# Patient Record
Sex: FEMALE | Race: WHITE | NOT HISPANIC OR LATINO | Employment: OTHER | URBAN - NONMETROPOLITAN AREA
[De-identification: names, ages, dates, MRNs, and addresses within clinical notes are randomized per-mention and may not be internally consistent; named-entity substitution may affect disease eponyms.]

---

## 2017-01-01 ENCOUNTER — APPOINTMENT (OUTPATIENT)
Dept: GENERAL RADIOLOGY | Facility: HOSPITAL | Age: 72
End: 2017-01-01
Attending: STUDENT IN AN ORGANIZED HEALTH CARE EDUCATION/TRAINING PROGRAM

## 2017-01-01 ENCOUNTER — TELEPHONE (OUTPATIENT)
Dept: INTERNAL MEDICINE | Facility: CLINIC | Age: 72
End: 2017-01-01

## 2017-01-01 ENCOUNTER — OFFICE VISIT (OUTPATIENT)
Dept: INTERNAL MEDICINE | Facility: CLINIC | Age: 72
End: 2017-01-01

## 2017-01-01 ENCOUNTER — LAB (OUTPATIENT)
Dept: LAB | Facility: HOSPITAL | Age: 72
End: 2017-01-01
Attending: PSYCHIATRY & NEUROLOGY

## 2017-01-01 ENCOUNTER — APPOINTMENT (OUTPATIENT)
Dept: GENERAL RADIOLOGY | Facility: HOSPITAL | Age: 72
End: 2017-01-01

## 2017-01-01 ENCOUNTER — OUTSIDE FACILITY SERVICE (OUTPATIENT)
Dept: INTERNAL MEDICINE | Facility: CLINIC | Age: 72
End: 2017-01-01

## 2017-01-01 ENCOUNTER — HOSPITAL ENCOUNTER (EMERGENCY)
Facility: HOSPITAL | Age: 72
Discharge: HOME | End: 2017-02-27
Attending: EMERGENCY MEDICINE | Admitting: EMERGENCY MEDICINE

## 2017-01-01 ENCOUNTER — TRANSCRIBE ORDERS (OUTPATIENT)
Dept: ADMINISTRATIVE | Facility: HOSPITAL | Age: 72
End: 2017-01-01

## 2017-01-01 ENCOUNTER — APPOINTMENT (OUTPATIENT)
Dept: MAMMOGRAPHY | Facility: HOSPITAL | Age: 72
End: 2017-01-01

## 2017-01-01 ENCOUNTER — PATIENT OUTREACH (OUTPATIENT)
Dept: CASE MANAGEMENT | Facility: OTHER | Age: 72
End: 2017-01-01

## 2017-01-01 ENCOUNTER — HOSPITAL ENCOUNTER (OUTPATIENT)
Dept: MAMMOGRAPHY | Facility: HOSPITAL | Age: 72
Discharge: HOME | End: 2017-03-10
Admitting: FAMILY MEDICINE

## 2017-01-01 ENCOUNTER — TELEPHONE (OUTPATIENT)
Dept: NEUROLOGY | Facility: CLINIC | Age: 72
End: 2017-01-01

## 2017-01-01 ENCOUNTER — HOSPITAL ENCOUNTER (INPATIENT)
Facility: HOSPITAL | Age: 72
LOS: 7 days | Discharge: NURSING, REHAB OR LONG-TERM CARE | End: 2017-03-28
Attending: EMERGENCY MEDICINE | Admitting: INTERNAL MEDICINE

## 2017-01-01 ENCOUNTER — APPOINTMENT (OUTPATIENT)
Dept: CT IMAGING | Facility: HOSPITAL | Age: 72
End: 2017-01-01

## 2017-01-01 ENCOUNTER — HOSPITAL ENCOUNTER (OUTPATIENT)
Dept: MAMMOGRAPHY | Facility: HOSPITAL | Age: 72
End: 2017-01-01

## 2017-01-01 ENCOUNTER — HOSPITAL ENCOUNTER (INPATIENT)
Facility: HOSPITAL | Age: 72
LOS: 5 days | Discharge: NURSING, REHAB OR LONG-TERM CARE | End: 2017-03-08
Attending: EMERGENCY MEDICINE | Admitting: INTERNAL MEDICINE

## 2017-01-01 ENCOUNTER — APPOINTMENT (OUTPATIENT)
Dept: ULTRASOUND IMAGING | Facility: HOSPITAL | Age: 72
End: 2017-01-01

## 2017-01-01 ENCOUNTER — APPOINTMENT (OUTPATIENT)
Dept: CARDIOLOGY | Facility: HOSPITAL | Age: 72
End: 2017-01-01
Attending: INTERNAL MEDICINE

## 2017-01-01 ENCOUNTER — HOSPITAL ENCOUNTER (EMERGENCY)
Facility: HOSPITAL | Age: 72
Discharge: HOME | End: 2017-06-05
Attending: EMERGENCY MEDICINE | Admitting: EMERGENCY MEDICINE

## 2017-01-01 ENCOUNTER — HOSPITAL ENCOUNTER (INPATIENT)
Facility: HOSPITAL | Age: 72
LOS: 4 days | Discharge: NURSING, REHAB OR LONG-TERM CARE | End: 2017-06-30
Attending: FAMILY MEDICINE | Admitting: FAMILY MEDICINE

## 2017-01-01 ENCOUNTER — APPOINTMENT (OUTPATIENT)
Dept: GENERAL RADIOLOGY | Facility: HOSPITAL | Age: 72
End: 2017-01-01
Attending: HOSPITALIST

## 2017-01-01 ENCOUNTER — OFFICE VISIT (OUTPATIENT)
Dept: NEUROLOGY | Facility: CLINIC | Age: 72
End: 2017-01-01

## 2017-01-01 ENCOUNTER — HOSPITAL ENCOUNTER (EMERGENCY)
Facility: HOSPITAL | Age: 72
Discharge: HOME | End: 2017-04-10
Attending: EMERGENCY MEDICINE | Admitting: EMERGENCY MEDICINE

## 2017-01-01 ENCOUNTER — TELEPHONE (OUTPATIENT)
Dept: CARDIOLOGY | Facility: CLINIC | Age: 72
End: 2017-01-01

## 2017-01-01 ENCOUNTER — HOSPITAL ENCOUNTER (EMERGENCY)
Facility: HOSPITAL | Age: 72
Discharge: HOME | End: 2017-04-02
Attending: EMERGENCY MEDICINE | Admitting: EMERGENCY MEDICINE

## 2017-01-01 ENCOUNTER — HOSPITAL ENCOUNTER (EMERGENCY)
Facility: HOSPITAL | Age: 72
Discharge: HOME | End: 2017-04-08
Attending: EMERGENCY MEDICINE | Admitting: EMERGENCY MEDICINE

## 2017-01-01 ENCOUNTER — HOSPITAL ENCOUNTER (EMERGENCY)
Facility: HOSPITAL | Age: 72
Discharge: HOME | End: 2017-06-07
Attending: STUDENT IN AN ORGANIZED HEALTH CARE EDUCATION/TRAINING PROGRAM | Admitting: STUDENT IN AN ORGANIZED HEALTH CARE EDUCATION/TRAINING PROGRAM

## 2017-01-01 VITALS
HEART RATE: 63 BPM | HEIGHT: 65 IN | OXYGEN SATURATION: 95 % | WEIGHT: 185 LBS | RESPIRATION RATE: 16 BRPM | DIASTOLIC BLOOD PRESSURE: 52 MMHG | TEMPERATURE: 98.9 F | SYSTOLIC BLOOD PRESSURE: 116 MMHG | BODY MASS INDEX: 30.82 KG/M2

## 2017-01-01 VITALS
TEMPERATURE: 98.2 F | WEIGHT: 165 LBS | HEIGHT: 65 IN | RESPIRATION RATE: 19 BRPM | HEART RATE: 81 BPM | OXYGEN SATURATION: 98 % | DIASTOLIC BLOOD PRESSURE: 60 MMHG | BODY MASS INDEX: 27.49 KG/M2 | SYSTOLIC BLOOD PRESSURE: 144 MMHG

## 2017-01-01 VITALS
SYSTOLIC BLOOD PRESSURE: 133 MMHG | OXYGEN SATURATION: 98 % | BODY MASS INDEX: 29.16 KG/M2 | HEIGHT: 65 IN | DIASTOLIC BLOOD PRESSURE: 68 MMHG | HEART RATE: 74 BPM | TEMPERATURE: 97.8 F | RESPIRATION RATE: 24 BRPM | WEIGHT: 175 LBS

## 2017-01-01 VITALS
WEIGHT: 161 LBS | HEART RATE: 73 BPM | SYSTOLIC BLOOD PRESSURE: 140 MMHG | HEIGHT: 65 IN | TEMPERATURE: 98 F | DIASTOLIC BLOOD PRESSURE: 76 MMHG | BODY MASS INDEX: 26.82 KG/M2 | OXYGEN SATURATION: 94 % | RESPIRATION RATE: 17 BRPM

## 2017-01-01 VITALS
HEIGHT: 65 IN | BODY MASS INDEX: 31.58 KG/M2 | OXYGEN SATURATION: 91 % | WEIGHT: 189.56 LBS | RESPIRATION RATE: 22 BRPM | SYSTOLIC BLOOD PRESSURE: 120 MMHG | HEART RATE: 72 BPM | DIASTOLIC BLOOD PRESSURE: 51 MMHG | TEMPERATURE: 97.9 F

## 2017-01-01 VITALS
TEMPERATURE: 98.3 F | BODY MASS INDEX: 28.66 KG/M2 | OXYGEN SATURATION: 96 % | WEIGHT: 172 LBS | HEART RATE: 79 BPM | HEIGHT: 65 IN | SYSTOLIC BLOOD PRESSURE: 122 MMHG | DIASTOLIC BLOOD PRESSURE: 74 MMHG

## 2017-01-01 VITALS
OXYGEN SATURATION: 97 % | BODY MASS INDEX: 30.99 KG/M2 | DIASTOLIC BLOOD PRESSURE: 58 MMHG | HEART RATE: 62 BPM | TEMPERATURE: 98 F | WEIGHT: 186 LBS | SYSTOLIC BLOOD PRESSURE: 124 MMHG | HEIGHT: 65 IN

## 2017-01-01 VITALS
OXYGEN SATURATION: 93 % | HEIGHT: 65 IN | TEMPERATURE: 98.1 F | RESPIRATION RATE: 16 BRPM | WEIGHT: 185 LBS | DIASTOLIC BLOOD PRESSURE: 70 MMHG | SYSTOLIC BLOOD PRESSURE: 133 MMHG | BODY MASS INDEX: 30.82 KG/M2 | HEART RATE: 62 BPM

## 2017-01-01 VITALS
WEIGHT: 180 LBS | DIASTOLIC BLOOD PRESSURE: 64 MMHG | BODY MASS INDEX: 29.99 KG/M2 | OXYGEN SATURATION: 96 % | SYSTOLIC BLOOD PRESSURE: 128 MMHG | HEART RATE: 77 BPM | HEIGHT: 65 IN | TEMPERATURE: 98.2 F

## 2017-01-01 VITALS
WEIGHT: 185.4 LBS | OXYGEN SATURATION: 100 % | HEART RATE: 69 BPM | RESPIRATION RATE: 18 BRPM | BODY MASS INDEX: 30.89 KG/M2 | TEMPERATURE: 97.4 F | HEIGHT: 65 IN | SYSTOLIC BLOOD PRESSURE: 133 MMHG | DIASTOLIC BLOOD PRESSURE: 62 MMHG

## 2017-01-01 VITALS
BODY MASS INDEX: 29.73 KG/M2 | WEIGHT: 185 LBS | DIASTOLIC BLOOD PRESSURE: 98 MMHG | RESPIRATION RATE: 20 BRPM | HEIGHT: 66 IN | OXYGEN SATURATION: 97 % | TEMPERATURE: 97.6 F | SYSTOLIC BLOOD PRESSURE: 129 MMHG | HEART RATE: 65 BPM

## 2017-01-01 VITALS
SYSTOLIC BLOOD PRESSURE: 121 MMHG | RESPIRATION RATE: 20 BRPM | HEIGHT: 65 IN | WEIGHT: 172.8 LBS | TEMPERATURE: 97.5 F | HEART RATE: 83 BPM | OXYGEN SATURATION: 97 % | BODY MASS INDEX: 28.79 KG/M2 | DIASTOLIC BLOOD PRESSURE: 50 MMHG

## 2017-01-01 VITALS
WEIGHT: 183 LBS | SYSTOLIC BLOOD PRESSURE: 118 MMHG | HEART RATE: 77 BPM | HEIGHT: 65 IN | OXYGEN SATURATION: 100 % | BODY MASS INDEX: 30.49 KG/M2 | DIASTOLIC BLOOD PRESSURE: 64 MMHG

## 2017-01-01 VITALS
SYSTOLIC BLOOD PRESSURE: 104 MMHG | HEIGHT: 65 IN | TEMPERATURE: 99.1 F | RESPIRATION RATE: 14 BRPM | OXYGEN SATURATION: 98 % | DIASTOLIC BLOOD PRESSURE: 64 MMHG | HEART RATE: 82 BPM

## 2017-01-01 DIAGNOSIS — Z00.00 ROUTINE ADULT HEALTH MAINTENANCE: ICD-10-CM

## 2017-01-01 DIAGNOSIS — S09.90XD: ICD-10-CM

## 2017-01-01 DIAGNOSIS — F51.04 PSYCHOPHYSIOLOGICAL INSOMNIA: ICD-10-CM

## 2017-01-01 DIAGNOSIS — Z13.9 SCREENING: Primary | ICD-10-CM

## 2017-01-01 DIAGNOSIS — I25.5 ISCHEMIC CARDIOMYOPATHY: ICD-10-CM

## 2017-01-01 DIAGNOSIS — J96.21 ACUTE ON CHRONIC RESPIRATORY FAILURE WITH HYPOXIA (HCC): ICD-10-CM

## 2017-01-01 DIAGNOSIS — Z12.31 ENCOUNTER FOR SCREENING MAMMOGRAM FOR MALIGNANT NEOPLASM OF BREAST: ICD-10-CM

## 2017-01-01 DIAGNOSIS — G91.2 NPH (NORMAL PRESSURE HYDROCEPHALUS) (HCC): ICD-10-CM

## 2017-01-01 DIAGNOSIS — F41.0 ANXIETY DISORDER DUE TO GENERAL MEDICAL CONDITION WITH PANIC ATTACK: ICD-10-CM

## 2017-01-01 DIAGNOSIS — R79.89 ABNORMAL CBC: ICD-10-CM

## 2017-01-01 DIAGNOSIS — I50.23 ACUTE ON CHRONIC SYSTOLIC CONGESTIVE HEART FAILURE (HCC): ICD-10-CM

## 2017-01-01 DIAGNOSIS — R53.81 PHYSICAL DECONDITIONING: Primary | ICD-10-CM

## 2017-01-01 DIAGNOSIS — R29.6 FREQUENT FALLS: ICD-10-CM

## 2017-01-01 DIAGNOSIS — R29.6 FREQUENT FALLS: Primary | ICD-10-CM

## 2017-01-01 DIAGNOSIS — N39.0 URINARY TRACT INFECTION WITHOUT HEMATURIA, SITE UNSPECIFIED: Primary | ICD-10-CM

## 2017-01-01 DIAGNOSIS — R39.9 URINARY SYMPTOM OR SIGN: ICD-10-CM

## 2017-01-01 DIAGNOSIS — R41.0 CONFUSION AND DISORIENTATION: ICD-10-CM

## 2017-01-01 DIAGNOSIS — R79.89 ELEVATED SERUM CREATININE: ICD-10-CM

## 2017-01-01 DIAGNOSIS — I50.22 CHRONIC SYSTOLIC CHF (CONGESTIVE HEART FAILURE), NYHA CLASS 3 (HCC): ICD-10-CM

## 2017-01-01 DIAGNOSIS — D50.8 OTHER IRON DEFICIENCY ANEMIA: ICD-10-CM

## 2017-01-01 DIAGNOSIS — R74.8 ELEVATED LIVER ENZYMES: ICD-10-CM

## 2017-01-01 DIAGNOSIS — R60.9 PERIPHERAL EDEMA: ICD-10-CM

## 2017-01-01 DIAGNOSIS — R53.1 WEAKNESS: ICD-10-CM

## 2017-01-01 DIAGNOSIS — R06.01 ORTHOPNEA: ICD-10-CM

## 2017-01-01 DIAGNOSIS — K21.9 GASTROESOPHAGEAL REFLUX DISEASE, ESOPHAGITIS PRESENCE NOT SPECIFIED: ICD-10-CM

## 2017-01-01 DIAGNOSIS — S09.90XA HEAD INJURY, INITIAL ENCOUNTER: ICD-10-CM

## 2017-01-01 DIAGNOSIS — R13.12 DYSPHAGIA, OROPHARYNGEAL PHASE: Primary | ICD-10-CM

## 2017-01-01 DIAGNOSIS — Z78.9 IMPAIRED MOBILITY AND ADLS: ICD-10-CM

## 2017-01-01 DIAGNOSIS — R41.0 DISORIENTATION, UNSPECIFIED: Primary | ICD-10-CM

## 2017-01-01 DIAGNOSIS — G25.81 RLS (RESTLESS LEGS SYNDROME): ICD-10-CM

## 2017-01-01 DIAGNOSIS — R11.0 NAUSEA: Primary | ICD-10-CM

## 2017-01-01 DIAGNOSIS — J44.9 COPD, SEVERE (HCC): Primary | Chronic | ICD-10-CM

## 2017-01-01 DIAGNOSIS — F33.1 MODERATE EPISODE OF RECURRENT MAJOR DEPRESSIVE DISORDER (HCC): ICD-10-CM

## 2017-01-01 DIAGNOSIS — L29.9 ITCHING: Primary | ICD-10-CM

## 2017-01-01 DIAGNOSIS — Z00.00 ROUTINE ADULT HEALTH MAINTENANCE: Primary | ICD-10-CM

## 2017-01-01 DIAGNOSIS — I50.22 CHRONIC SYSTOLIC HEART FAILURE (HCC): ICD-10-CM

## 2017-01-01 DIAGNOSIS — F51.04 PSYCHOPHYSIOLOGICAL INSOMNIA: Primary | ICD-10-CM

## 2017-01-01 DIAGNOSIS — N17.9 ACUTE RENAL FAILURE, UNSPECIFIED ACUTE RENAL FAILURE TYPE (HCC): Primary | ICD-10-CM

## 2017-01-01 DIAGNOSIS — I27.20 MODERATE TO SEVERE PULMONARY HYPERTENSION (HCC): ICD-10-CM

## 2017-01-01 DIAGNOSIS — N28.9 RENAL INSUFFICIENCY: ICD-10-CM

## 2017-01-01 DIAGNOSIS — R10.84 GENERALIZED ABDOMINAL PAIN: Primary | ICD-10-CM

## 2017-01-01 DIAGNOSIS — F06.4 ANXIETY DISORDER DUE TO GENERAL MEDICAL CONDITION WITH PANIC ATTACK: ICD-10-CM

## 2017-01-01 DIAGNOSIS — K76.9 HEPATIC DYSFUNCTION: ICD-10-CM

## 2017-01-01 DIAGNOSIS — Z74.09 IMPAIRED MOBILITY AND ADLS: ICD-10-CM

## 2017-01-01 DIAGNOSIS — K59.00 CONSTIPATION, UNSPECIFIED CONSTIPATION TYPE: ICD-10-CM

## 2017-01-01 DIAGNOSIS — R53.1 WEAKNESS GENERALIZED: ICD-10-CM

## 2017-01-01 DIAGNOSIS — I50.9 CONGESTIVE HEART FAILURE, UNSPECIFIED CONGESTIVE HEART FAILURE CHRONICITY, UNSPECIFIED CONGESTIVE HEART FAILURE TYPE: Primary | ICD-10-CM

## 2017-01-01 DIAGNOSIS — I50.9 ACUTE ON CHRONIC CONGESTIVE HEART FAILURE, UNSPECIFIED CONGESTIVE HEART FAILURE TYPE: ICD-10-CM

## 2017-01-01 DIAGNOSIS — F41.9 ANXIETY: Primary | ICD-10-CM
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
## 2017-01-01 DIAGNOSIS — I50.22 CHRONIC SYSTOLIC CHF (CONGESTIVE HEART FAILURE), NYHA CLASS 3 (HCC): Primary | ICD-10-CM

## 2017-01-01 DIAGNOSIS — E11.42 DIABETIC PERIPHERAL NEUROPATHY ASSOCIATED WITH TYPE 2 DIABETES MELLITUS (HCC): Primary | ICD-10-CM

## 2017-01-01 DIAGNOSIS — S09.90XA HEAD INJURY, INITIAL ENCOUNTER: Primary | ICD-10-CM

## 2017-01-01 DIAGNOSIS — L29.9 ITCHING: ICD-10-CM

## 2017-01-01 DIAGNOSIS — R26.81 UNSTEADY GAIT: ICD-10-CM

## 2017-01-01 DIAGNOSIS — Z74.09 IMPAIRED FUNCTIONAL MOBILITY, BALANCE, GAIT, AND ENDURANCE: ICD-10-CM

## 2017-01-01 DIAGNOSIS — E55.9 VITAMIN D DEFICIENCY DISEASE: ICD-10-CM

## 2017-01-01 DIAGNOSIS — G31.9 DIFFUSE BRAIN ATROPHY (HCC): ICD-10-CM

## 2017-01-01 DIAGNOSIS — G25.81 RESTLESS LEGS SYNDROME (RLS): Primary | ICD-10-CM
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
## 2017-01-01 DIAGNOSIS — J44.9 COPD, SEVERE (HCC): Chronic | ICD-10-CM

## 2017-01-01 DIAGNOSIS — R77.8 ELEVATED TROPONIN: ICD-10-CM

## 2017-01-01 DIAGNOSIS — N39.0 URINARY TRACT INFECTION, SITE UNSPECIFIED: ICD-10-CM

## 2017-01-01 DIAGNOSIS — E87.5 HYPERKALEMIA: ICD-10-CM

## 2017-01-01 DIAGNOSIS — J96.11 CHRONIC RESPIRATORY FAILURE WITH HYPOXIA (HCC): ICD-10-CM

## 2017-01-01 LAB
25(OH)D3+25(OH)D2 SERPL-MCNC: 34 NG/ML
A1AT SERPL-MCNC: 149 MG/DL (ref 90–200)
ACINETOBACTER SCREEN CX: NO GROWTH
ACTIN IGG SERPL-ACNC: 9 UNITS (ref 0–19)
ALBUMIN SERPL-MCNC: 3.1 G/DL (ref 3.5–5)
ALBUMIN SERPL-MCNC: 3.1 G/DL (ref 3.5–5)
ALBUMIN SERPL-MCNC: 3.2 G/DL (ref 3.5–5)
ALBUMIN SERPL-MCNC: 3.3 G/DL (ref 3.5–5)
ALBUMIN SERPL-MCNC: 3.4 G/DL (ref 3.5–5)
ALBUMIN SERPL-MCNC: 3.5 G/DL (ref 3.5–5)
ALBUMIN SERPL-MCNC: 3.6 G/DL (ref 3.5–5)
ALBUMIN SERPL-MCNC: 3.6 G/DL (ref 3.5–5)
ALBUMIN SERPL-MCNC: 3.7 G/DL (ref 3.5–5)
ALBUMIN SERPL-MCNC: 3.7 G/DL (ref 3.5–5)
ALBUMIN SERPL-MCNC: 3.8 G/DL (ref 3.5–5)
ALBUMIN SERPL-MCNC: 3.8 G/DL (ref 3.5–5)
ALBUMIN SERPL-MCNC: 4.1 G/DL (ref 3.5–5)
ALBUMIN SERPL-MCNC: 4.1 G/DL (ref 3.5–5)
ALBUMIN SERPL-MCNC: 4.3 G/DL (ref 3.5–5)
ALBUMIN/GLOB SERPL: 1 G/DL (ref 1–2)
ALBUMIN/GLOB SERPL: 1 G/DL (ref 1–2)
ALBUMIN/GLOB SERPL: 1.1 G/DL (ref 1–2)
ALBUMIN/GLOB SERPL: 1.2 G/DL (ref 1–2)
ALBUMIN/GLOB SERPL: 1.3 G/DL (ref 1–2)
ALBUMIN/GLOB SERPL: 1.3 G/DL (ref 1–2)
ALBUMIN/GLOB SERPL: 1.4 G/DL (ref 1–2)
ALBUMIN/GLOB SERPL: 1.5 G/DL (ref 1–2)
ALBUMIN/GLOB SERPL: 1.5 G/DL (ref 1–2)
ALP SERPL-CCNC: 115 U/L (ref 38–126)
ALP SERPL-CCNC: 120 U/L (ref 38–126)
ALP SERPL-CCNC: 121 U/L (ref 38–126)
ALP SERPL-CCNC: 124 U/L (ref 38–126)
ALP SERPL-CCNC: 132 U/L (ref 38–126)
ALP SERPL-CCNC: 135 U/L (ref 38–126)
ALP SERPL-CCNC: 145 U/L (ref 38–126)
ALP SERPL-CCNC: 148 U/L (ref 38–126)
ALP SERPL-CCNC: 154 U/L (ref 38–126)
ALP SERPL-CCNC: 165 U/L (ref 38–126)
ALP SERPL-CCNC: 166 U/L (ref 38–126)
ALP SERPL-CCNC: 205 U/L (ref 38–126)
ALT SERPL W P-5'-P-CCNC: 1063 U/L (ref 13–69)
ALT SERPL W P-5'-P-CCNC: 515 U/L (ref 13–69)
ALT SERPL W P-5'-P-CCNC: 54 U/L (ref 13–69)
ALT SERPL W P-5'-P-CCNC: 58 U/L (ref 13–69)
ALT SERPL W P-5'-P-CCNC: 603 U/L (ref 13–69)
ALT SERPL W P-5'-P-CCNC: 67 U/L (ref 13–69)
ALT SERPL W P-5'-P-CCNC: 713 U/L (ref 13–69)
ALT SERPL W P-5'-P-CCNC: 75 U/L (ref 13–69)
ALT SERPL W P-5'-P-CCNC: 904 U/L (ref 13–69)
ALT SERPL W P-5'-P-CCNC: 914 U/L (ref 13–69)
ALT SERPL W P-5'-P-CCNC: 98 U/L (ref 13–69)
ALT SERPL-CCNC: 43 U/L (ref 13–69)
AMMONIA BLD-SCNC: 17 UMOL/L (ref 9–30)
AMORPH URATE CRY URNS QL MICRO: ABNORMAL /HPF
ANION GAP SERPL CALCULATED.3IONS-SCNC: 10.4 MMOL/L
ANION GAP SERPL CALCULATED.3IONS-SCNC: 10.9 MMOL/L
ANION GAP SERPL CALCULATED.3IONS-SCNC: 11.1 MMOL/L
ANION GAP SERPL CALCULATED.3IONS-SCNC: 11.6 MMOL/L
ANION GAP SERPL CALCULATED.3IONS-SCNC: 11.9 MMOL/L
ANION GAP SERPL CALCULATED.3IONS-SCNC: 12.5 MMOL/L
ANION GAP SERPL CALCULATED.3IONS-SCNC: 12.6 MMOL/L
ANION GAP SERPL CALCULATED.3IONS-SCNC: 12.8 MMOL/L
ANION GAP SERPL CALCULATED.3IONS-SCNC: 13.1 MMOL/L
ANION GAP SERPL CALCULATED.3IONS-SCNC: 13.8 MMOL/L
ANION GAP SERPL CALCULATED.3IONS-SCNC: 14.4 MMOL/L
ANION GAP SERPL CALCULATED.3IONS-SCNC: 14.5 MMOL/L
ANION GAP SERPL CALCULATED.3IONS-SCNC: 15.1 MMOL/L
ANION GAP SERPL CALCULATED.3IONS-SCNC: 15.3 MMOL/L
ANION GAP SERPL CALCULATED.3IONS-SCNC: 15.6 MMOL/L
ANION GAP SERPL CALCULATED.3IONS-SCNC: 15.9 MMOL/L
ANION GAP SERPL CALCULATED.3IONS-SCNC: 17.1 MMOL/L
ANION GAP SERPL CALCULATED.3IONS-SCNC: 17.5 MMOL/L
ANION GAP SERPL CALCULATED.3IONS-SCNC: 6.8 MMOL/L
ARTERIAL PATENCY WRIST A: POSITIVE
ARTERIAL PATENCY WRIST A: POSITIVE
AST SERPL-CCNC: 162 U/L (ref 15–46)
AST SERPL-CCNC: 399 U/L (ref 15–46)
AST SERPL-CCNC: 41 U/L (ref 15–46)
AST SERPL-CCNC: 464 U/L (ref 15–46)
AST SERPL-CCNC: 51 U/L (ref 15–46)
AST SERPL-CCNC: 53 U/L (ref 15–46)
AST SERPL-CCNC: 58 U/L (ref 15–46)
AST SERPL-CCNC: 72 U/L (ref 15–46)
AST SERPL-CCNC: 721 U/L (ref 15–46)
AST SERPL-CCNC: 730 U/L (ref 15–46)
AST SERPL-CCNC: 82 U/L (ref 15–46)
AST SERPL-CCNC: 980 U/L (ref 15–46)
ATMOSPHERIC PRESS: 746 MMHG
ATMOSPHERIC PRESS: 746 MMHG
BACTERIA SPEC AEROBE CULT: ABNORMAL
BACTERIA UR QL AUTO: ABNORMAL /HPF
BACTERIA UR QL AUTO: NORMAL /HPF
BASE EXCESS BLDA CALC-SCNC: -2.3 MMOL/L
BASE EXCESS BLDA CALC-SCNC: -2.3 MMOL/L
BASOPHILS # BLD AUTO: 0.01 10*3/MM3 (ref 0–0.2)
BASOPHILS # BLD AUTO: 0.02 10*3/MM3 (ref 0–0.2)
BASOPHILS # BLD AUTO: 0.03 10*3/MM3 (ref 0–0.2)
BASOPHILS # BLD AUTO: 0.04 10*3/MM3 (ref 0–0.2)
BASOPHILS # BLD AUTO: 0.06 10*3/MM3 (ref 0–0.2)
BASOPHILS # BLD AUTO: 0.07 10*3/MM3 (ref 0–0.2)
BASOPHILS # BLD AUTO: 0.08 10*3/MM3 (ref 0–0.2)
BASOPHILS NFR BLD AUTO: 0.1 % (ref 0–2.5)
BASOPHILS NFR BLD AUTO: 0.2 % (ref 0–2.5)
BASOPHILS NFR BLD AUTO: 0.2 % (ref 0–2.5)
BASOPHILS NFR BLD AUTO: 0.3 % (ref 0–2.5)
BASOPHILS NFR BLD AUTO: 0.3 % (ref 0–2.5)
BASOPHILS NFR BLD AUTO: 0.4 % (ref 0–2.5)
BASOPHILS NFR BLD AUTO: 0.4 % (ref 0–2.5)
BASOPHILS NFR BLD AUTO: 0.5 % (ref 0–2.5)
BASOPHILS NFR BLD AUTO: 0.6 % (ref 0–2.5)
BASOPHILS NFR BLD AUTO: 0.6 % (ref 0–2.5)
BASOPHILS NFR BLD AUTO: 0.7 % (ref 0–2.5)
BASOPHILS NFR BLD AUTO: 0.7 % (ref 0–2.5)
BASOPHILS NFR BLD AUTO: 1.1 % (ref 0–2.5)
BASOPHILS NFR BLD AUTO: 1.2 % (ref 0–2.5)
BDY SITE: ABNORMAL
BDY SITE: ABNORMAL
BH CV ECHO MEAS - % IVS THICK: 0 %
BH CV ECHO MEAS - % LVPW THICK: 23.3 %
BH CV ECHO MEAS - AO ACC SLOPE: 823.6 CM/SEC^2
BH CV ECHO MEAS - AO ACC TIME: 0.09 SEC
BH CV ECHO MEAS - AO ROOT AREA (BSA CORRECTED): 1.4
BH CV ECHO MEAS - AO ROOT AREA: 6.3 CM^2
BH CV ECHO MEAS - AO ROOT DIAM: 2.8 CM
BH CV ECHO MEAS - BSA(HAYCOCK): 2.1 M^2
BH CV ECHO MEAS - BSA: 2 M^2
BH CV ECHO MEAS - BZI_BMI: 33.1 KILOGRAMS/M^2
BH CV ECHO MEAS - BZI_METRIC_HEIGHT: 165.1 CM
BH CV ECHO MEAS - BZI_METRIC_WEIGHT: 90.3 KG
BH CV ECHO MEAS - EDV(CUBED): 168.7 ML
BH CV ECHO MEAS - EDV(TEICH): 149 ML
BH CV ECHO MEAS - EF(CUBED): 43.9 %
BH CV ECHO MEAS - EF(TEICH): 36.1 %
BH CV ECHO MEAS - ESV(CUBED): 94.7 ML
BH CV ECHO MEAS - ESV(TEICH): 95.2 ML
BH CV ECHO MEAS - FS: 17.5 %
BH CV ECHO MEAS - IVS/LVPW: 0.8
BH CV ECHO MEAS - IVSD: 0.97 CM
BH CV ECHO MEAS - IVSS: 0.97 CM
BH CV ECHO MEAS - LA DIMENSION: 4.5 CM
BH CV ECHO MEAS - LA/AO: 1.6
BH CV ECHO MEAS - LV MASS(C)D: 240.6 GRAMS
BH CV ECHO MEAS - LV MASS(C)DI: 121.9 GRAMS/M^2
BH CV ECHO MEAS - LV MASS(C)S: 209.4 GRAMS
BH CV ECHO MEAS - LV MASS(C)SI: 106.1 GRAMS/M^2
BH CV ECHO MEAS - LV MAX PG: 2.1 MMHG
BH CV ECHO MEAS - LV MEAN PG: 1.1 MMHG
BH CV ECHO MEAS - LV V1 MAX: 72.5 CM/SEC
BH CV ECHO MEAS - LV V1 MEAN: 49.5 CM/SEC
BH CV ECHO MEAS - LV V1 VTI: 18.8 CM
BH CV ECHO MEAS - LVIDD: 5.5 CM
BH CV ECHO MEAS - LVIDS: 4.6 CM
BH CV ECHO MEAS - LVOT AREA (M): 1.8 CM^2
BH CV ECHO MEAS - LVOT AREA: 1.9 CM^2
BH CV ECHO MEAS - LVOT DIAM: 1.5 CM
BH CV ECHO MEAS - LVPWD: 1.2 CM
BH CV ECHO MEAS - LVPWS: 1.5 CM
BH CV ECHO MEAS - MV A MAX VEL: 67.9 CM/SEC
BH CV ECHO MEAS - MV DEC SLOPE: 664.1 CM/SEC^2
BH CV ECHO MEAS - MV E MAX VEL: 110.7 CM/SEC
BH CV ECHO MEAS - MV E/A: 1.6
BH CV ECHO MEAS - MV MAX PG: 4.8 MMHG
BH CV ECHO MEAS - MV MEAN PG: 1.1 MMHG
BH CV ECHO MEAS - MV P1/2T MAX VEL: 110.7 CM/SEC
BH CV ECHO MEAS - MV P1/2T: 48.8 MSEC
BH CV ECHO MEAS - MV V2 MAX: 109.1 CM/SEC
BH CV ECHO MEAS - MV V2 MEAN: 48.6 CM/SEC
BH CV ECHO MEAS - MV V2 VTI: 28.4 CM
BH CV ECHO MEAS - MVA P1/2T LCG: 2 CM^2
BH CV ECHO MEAS - MVA(P1/2T): 4.5 CM^2
BH CV ECHO MEAS - MVA(VTI): 1.3 CM^2
BH CV ECHO MEAS - PA ACC SLOPE: 731.4 CM/SEC^2
BH CV ECHO MEAS - PA ACC TIME: 0.08 SEC
BH CV ECHO MEAS - PA MAX PG: 1.5 MMHG
BH CV ECHO MEAS - PA MEAN PG: 0.76 MMHG
BH CV ECHO MEAS - PA PR(ACCEL): 42.6 MMHG
BH CV ECHO MEAS - PA V2 MAX: 60.2 CM/SEC
BH CV ECHO MEAS - PA V2 MEAN: 41.2 CM/SEC
BH CV ECHO MEAS - PA V2 VTI: 14.8 CM
BH CV ECHO MEAS - RVSP: 47 MMHG
BH CV ECHO MEAS - SI(CUBED): 37.5 ML/M^2
BH CV ECHO MEAS - SI(LVOT): 18 ML/M^2
BH CV ECHO MEAS - SI(TEICH): 27.2 ML/M^2
BH CV ECHO MEAS - SV(CUBED): 74 ML
BH CV ECHO MEAS - SV(LVOT): 35.5 ML
BH CV ECHO MEAS - SV(TEICH): 53.8 ML
BH CV ECHO MEAS - TR MAX VEL: 240.4 CM/SEC
BILIRUB BLD-MCNC: NEGATIVE MG/DL
BILIRUB CONJ SERPL-MCNC: 0.5 MG/DL (ref 0–0.4)
BILIRUB INDIRECT SERPL-MCNC: 0.4 MG/DL
BILIRUB SERPL-MCNC: 0.8 MG/DL (ref 0.2–1.3)
BILIRUB SERPL-MCNC: 0.9 MG/DL (ref 0.2–1.3)
BILIRUB SERPL-MCNC: 0.9 MG/DL (ref 0.2–1.3)
BILIRUB SERPL-MCNC: 1 MG/DL (ref 0.2–1.3)
BILIRUB SERPL-MCNC: 1.2 MG/DL (ref 0.2–1.3)
BILIRUB SERPL-MCNC: 1.3 MG/DL (ref 0.2–1.3)
BILIRUB SERPL-MCNC: 1.3 MG/DL (ref 0.2–1.3)
BILIRUB SERPL-MCNC: 1.4 MG/DL (ref 0.2–1.3)
BILIRUB SERPL-MCNC: 1.8 MG/DL (ref 0.2–1.3)
BILIRUB SERPL-MCNC: 1.9 MG/DL (ref 0.2–1.3)
BILIRUB SERPL-MCNC: 2.3 MG/DL (ref 0.2–1.3)
BILIRUB SERPL-MCNC: 2.8 MG/DL (ref 0.2–1.3)
BILIRUB UR QL STRIP: ABNORMAL
BILIRUB UR QL STRIP: NEGATIVE
BUN BLD-MCNC: 14 MG/DL (ref 7–20)
BUN BLD-MCNC: 16 MG/DL (ref 7–20)
BUN BLD-MCNC: 18 MG/DL (ref 7–20)
BUN BLD-MCNC: 20 MG/DL (ref 7–20)
BUN BLD-MCNC: 22 MG/DL (ref 7–20)
BUN BLD-MCNC: 22 MG/DL (ref 7–20)
BUN BLD-MCNC: 23 MG/DL (ref 7–20)
BUN BLD-MCNC: 26 MG/DL (ref 7–20)
BUN BLD-MCNC: 28 MG/DL (ref 7–20)
BUN BLD-MCNC: 34 MG/DL (ref 7–20)
BUN BLD-MCNC: 35 MG/DL (ref 7–20)
BUN BLD-MCNC: 36 MG/DL (ref 7–20)
BUN BLD-MCNC: 49 MG/DL (ref 7–20)
BUN BLD-MCNC: 49 MG/DL (ref 7–20)
BUN BLD-MCNC: 50 MG/DL (ref 7–20)
BUN BLD-MCNC: 57 MG/DL (ref 7–20)
BUN BLD-MCNC: 59 MG/DL (ref 7–20)
BUN BLD-MCNC: 66 MG/DL (ref 7–20)
BUN BLD-MCNC: 68 MG/DL (ref 7–20)
BUN BLD-MCNC: 71 MG/DL (ref 7–20)
BUN BLD-MCNC: 73 MG/DL (ref 7–20)
BUN SERPL-MCNC: 30 MG/DL (ref 7–20)
BUN/CREAT SERPL: 15.6 (ref 7.1–23.5)
BUN/CREAT SERPL: 18.2 (ref 7.1–23.5)
BUN/CREAT SERPL: 20 (ref 7.1–23.5)
BUN/CREAT SERPL: 21.9 (ref 7.1–23.5)
BUN/CREAT SERPL: 22 (ref 7.1–23.5)
BUN/CREAT SERPL: 22.5 (ref 7.1–23.5)
BUN/CREAT SERPL: 22.8 (ref 7.1–23.5)
BUN/CREAT SERPL: 22.9 (ref 7.1–23.5)
BUN/CREAT SERPL: 23.3 (ref 7.1–23.5)
BUN/CREAT SERPL: 26.4 (ref 7.1–23.5)
BUN/CREAT SERPL: 27.5 (ref 7.1–23.5)
BUN/CREAT SERPL: 29.5 (ref 7.1–23.5)
BUN/CREAT SERPL: 36 (ref 7.1–23.5)
BUN/CREAT SERPL: 37.1 (ref 7.1–23.5)
BUN/CREAT SERPL: 37.5 (ref 7.1–23.5)
BUN/CREAT SERPL: 37.8 (ref 7.1–23.5)
BUN/CREAT SERPL: 38.3 (ref 7.1–23.5)
BUN/CREAT SERPL: 40.8 (ref 7.1–23.5)
BUN/CREAT SERPL: 40.8 (ref 7.1–23.5)
BUN/CREAT SERPL: 43.8 (ref 7.1–23.5)
BUN/CREAT SERPL: 45.5 (ref 7.1–23.5)
BUN/CREAT SERPL: 47.5 (ref 7.1–23.5)
CALCIUM SERPL-MCNC: 9.4 MG/DL (ref 8.4–10.2)
CALCIUM SPEC-SCNC: 8.2 MG/DL (ref 8.4–10.2)
CALCIUM SPEC-SCNC: 8.3 MG/DL (ref 8.4–10.2)
CALCIUM SPEC-SCNC: 8.3 MG/DL (ref 8.4–10.2)
CALCIUM SPEC-SCNC: 8.4 MG/DL (ref 8.4–10.2)
CALCIUM SPEC-SCNC: 8.6 MG/DL (ref 8.4–10.2)
CALCIUM SPEC-SCNC: 8.7 MG/DL (ref 8.4–10.2)
CALCIUM SPEC-SCNC: 8.8 MG/DL (ref 8.4–10.2)
CALCIUM SPEC-SCNC: 8.9 MG/DL (ref 8.4–10.2)
CALCIUM SPEC-SCNC: 9 MG/DL (ref 8.4–10.2)
CALCIUM SPEC-SCNC: 9.1 MG/DL (ref 8.4–10.2)
CALCIUM SPEC-SCNC: 9.2 MG/DL (ref 8.4–10.2)
CALCIUM SPEC-SCNC: 9.3 MG/DL (ref 8.4–10.2)
CALCIUM SPEC-SCNC: 9.3 MG/DL (ref 8.4–10.2)
CALCIUM SPEC-SCNC: 9.4 MG/DL (ref 8.4–10.2)
CALCIUM SPEC-SCNC: 9.6 MG/DL (ref 8.4–10.2)
CENTROMERE B AB SER-ACNC: <0.2 AI (ref 0–0.9)
CHLORIDE SERPL-SCNC: 100 MMOL/L (ref 98–107)
CHLORIDE SERPL-SCNC: 101 MMOL/L (ref 98–107)
CHLORIDE SERPL-SCNC: 82 MMOL/L (ref 98–107)
CHLORIDE SERPL-SCNC: 84 MMOL/L (ref 98–107)
CHLORIDE SERPL-SCNC: 87 MMOL/L (ref 98–107)
CHLORIDE SERPL-SCNC: 88 MMOL/L (ref 98–107)
CHLORIDE SERPL-SCNC: 89 MMOL/L (ref 98–107)
CHLORIDE SERPL-SCNC: 89 MMOL/L (ref 98–107)
CHLORIDE SERPL-SCNC: 90 MMOL/L (ref 98–107)
CHLORIDE SERPL-SCNC: 90 MMOL/L (ref 98–107)
CHLORIDE SERPL-SCNC: 91 MMOL/L (ref 98–107)
CHLORIDE SERPL-SCNC: 92 MMOL/L (ref 98–107)
CHLORIDE SERPL-SCNC: 93 MMOL/L (ref 98–107)
CHLORIDE SERPL-SCNC: 94 MMOL/L (ref 98–107)
CHLORIDE SERPL-SCNC: 96 MMOL/L (ref 98–107)
CHLORIDE SERPL-SCNC: 97 MMOL/L (ref 98–107)
CHLORIDE SERPL-SCNC: 98 MMOL/L (ref 98–107)
CHLORIDE SERPL-SCNC: 99 MMOL/L (ref 98–107)
CHOLEST SERPL-MCNC: 112 MG/DL (ref 0–199)
CHROMATIN AB SERPL-ACNC: <0.2 AI (ref 0–0.9)
CK MB SERPL-CCNC: 4.31 NG/ML (ref 0.2–2.4)
CK SERPL-CCNC: 1314 U/L (ref 30–170)
CK SERPL-CCNC: 139 U/L (ref 30–170)
CK SERPL-CCNC: 441 U/L (ref 30–170)
CK SERPL-CCNC: 532 U/L (ref 30–170)
CLARITY UR: CLEAR
CLARITY, POC: CLEAR
CO2 SERPL-SCNC: 22 MMOL/L (ref 26–30)
CO2 SERPL-SCNC: 24 MMOL/L (ref 26–30)
CO2 SERPL-SCNC: 25 MMOL/L (ref 26–30)
CO2 SERPL-SCNC: 26 MMOL/L (ref 26–30)
CO2 SERPL-SCNC: 27 MMOL/L (ref 26–30)
CO2 SERPL-SCNC: 27 MMOL/L (ref 26–30)
CO2 SERPL-SCNC: 28 MMOL/L (ref 26–30)
CO2 SERPL-SCNC: 28 MMOL/L (ref 26–30)
CO2 SERPL-SCNC: 29 MMOL/L (ref 26–30)
CO2 SERPL-SCNC: 30 MMOL/L (ref 26–30)
CO2 SERPL-SCNC: 31 MMOL/L (ref 26–30)
CO2 SERPL-SCNC: 32 MMOL/L (ref 26–30)
CO2 SERPL-SCNC: 33 MMOL/L (ref 26–30)
CO2 SERPL-SCNC: 33 MMOL/L (ref 26–30)
CO2 SERPL-SCNC: 35 MMOL/L (ref 26–30)
CO2 SERPL-SCNC: 35 MMOL/L (ref 26–30)
CO2 SERPL-SCNC: 37 MMOL/L (ref 26–30)
CO2 SERPL-SCNC: 37 MMOL/L (ref 26–30)
CO2 SERPL-SCNC: 40 MMOL/L (ref 26–30)
CO2 SERPL-SCNC: 40 MMOL/L (ref 26–30)
CO2 SERPL-SCNC: 45 MMOL/L (ref 26–30)
CO2 SERPL-SCNC: 46 MMOL/L (ref 26–30)
COLOR UR: YELLOW
CREAT BLD-MCNC: 0.6 MG/DL (ref 0.6–1.3)
CREAT BLD-MCNC: 0.7 MG/DL (ref 0.6–1.3)
CREAT BLD-MCNC: 0.8 MG/DL (ref 0.6–1.3)
CREAT BLD-MCNC: 0.9 MG/DL (ref 0.6–1.3)
CREAT BLD-MCNC: 0.9 MG/DL (ref 0.6–1.3)
CREAT BLD-MCNC: 1 MG/DL (ref 0.6–1.3)
CREAT BLD-MCNC: 1 MG/DL (ref 0.6–1.3)
CREAT BLD-MCNC: 1.1 MG/DL (ref 0.6–1.3)
CREAT BLD-MCNC: 1.1 MG/DL (ref 0.6–1.3)
CREAT BLD-MCNC: 1.2 MG/DL (ref 0.6–1.3)
CREAT BLD-MCNC: 2 MG/DL (ref 0.6–1.3)
CREAT BLD-MCNC: 2.5 MG/DL (ref 0.6–1.3)
CREAT BLD-MCNC: 3.1 MG/DL (ref 0.6–1.3)
CREAT BLD-MCNC: 3.1 MG/DL (ref 0.6–1.3)
CREAT BLD-MCNC: 3.2 MG/DL (ref 0.6–1.3)
CREAT SERPL-MCNC: 0.8 MG/DL (ref 0.6–1.3)
DEPRECATED MITOCHONDRIA M2 IGG SER-ACNC: <20 UNITS (ref 0–20)
DEPRECATED RDW RBC AUTO: 51.8 FL (ref 37–54)
DEPRECATED RDW RBC AUTO: 51.8 FL (ref 37–54)
DEPRECATED RDW RBC AUTO: 53.3 FL (ref 37–54)
DEPRECATED RDW RBC AUTO: 53.4 FL (ref 37–54)
DEPRECATED RDW RBC AUTO: 54.7 FL (ref 37–54)
DEPRECATED RDW RBC AUTO: 56.3 FL (ref 37–54)
DEPRECATED RDW RBC AUTO: 57 FL (ref 37–54)
DEPRECATED RDW RBC AUTO: 57.1 FL (ref 37–54)
DEPRECATED RDW RBC AUTO: 57.4 FL (ref 37–54)
DEPRECATED RDW RBC AUTO: 57.9 FL (ref 37–54)
DEPRECATED RDW RBC AUTO: 58.4 FL (ref 37–54)
DEPRECATED RDW RBC AUTO: 58.5 FL (ref 37–54)
DEPRECATED RDW RBC AUTO: 58.8 FL (ref 37–54)
DEPRECATED RDW RBC AUTO: 59 FL (ref 37–54)
DEPRECATED RDW RBC AUTO: 59.5 FL (ref 37–54)
DEPRECATED RDW RBC AUTO: 59.7 FL (ref 37–54)
DEPRECATED RDW RBC AUTO: 61.2 FL (ref 37–54)
DEPRECATED RDW RBC AUTO: 62.6 FL (ref 37–54)
DEPRECATED RDW RBC AUTO: 63.3 FL (ref 37–54)
DSDNA AB SER-ACNC: <1 IU/ML (ref 0–9)
ENA JO1 AB SER-ACNC: <0.2 AI (ref 0–0.9)
ENA RNP AB SER-ACNC: <0.2 AI (ref 0–0.9)
ENA SCL70 AB SER-ACNC: <0.2 AI (ref 0–0.9)
ENA SM AB SER-ACNC: <0.2 AI (ref 0–0.9)
ENA SS-A AB SER-ACNC: <0.2 AI (ref 0–0.9)
ENA SS-B AB SER-ACNC: <0.2 AI (ref 0–0.9)
EOSINOPHIL # BLD AUTO: 0 10*3/MM3 (ref 0–0.7)
EOSINOPHIL # BLD AUTO: 0 10*3/MM3 (ref 0–0.7)
EOSINOPHIL # BLD AUTO: 0.01 10*3/MM3 (ref 0–0.7)
EOSINOPHIL # BLD AUTO: 0.01 10*3/MM3 (ref 0–0.7)
EOSINOPHIL # BLD AUTO: 0.02 10*3/MM3 (ref 0–0.7)
EOSINOPHIL # BLD AUTO: 0.03 10*3/MM3 (ref 0–0.7)
EOSINOPHIL # BLD AUTO: 0.04 10*3/MM3 (ref 0–0.7)
EOSINOPHIL # BLD AUTO: 0.05 10*3/MM3 (ref 0–0.7)
EOSINOPHIL # BLD AUTO: 0.06 10*3/MM3 (ref 0–0.7)
EOSINOPHIL # BLD AUTO: 0.06 10*3/MM3 (ref 0–0.7)
EOSINOPHIL # BLD AUTO: 0.08 10*3/MM3 (ref 0–0.7)
EOSINOPHIL # BLD AUTO: 0.09 10*3/MM3 (ref 0–0.7)
EOSINOPHIL # BLD AUTO: 0.13 10*3/MM3 (ref 0–0.7)
EOSINOPHIL # BLD AUTO: 0.14 10*3/MM3 (ref 0–0.7)
EOSINOPHIL # BLD AUTO: 0.14 10*3/MM3 (ref 0–0.7)
EOSINOPHIL # BLD AUTO: 0.26 10*3/MM3 (ref 0–0.7)
EOSINOPHIL NFR BLD AUTO: 0 % (ref 0–7)
EOSINOPHIL NFR BLD AUTO: 0 % (ref 0–7)
EOSINOPHIL NFR BLD AUTO: 0.1 % (ref 0–7)
EOSINOPHIL NFR BLD AUTO: 0.1 % (ref 0–7)
EOSINOPHIL NFR BLD AUTO: 0.2 % (ref 0–7)
EOSINOPHIL NFR BLD AUTO: 0.5 % (ref 0–7)
EOSINOPHIL NFR BLD AUTO: 0.6 % (ref 0–7)
EOSINOPHIL NFR BLD AUTO: 0.7 % (ref 0–7)
EOSINOPHIL NFR BLD AUTO: 0.7 % (ref 0–7)
EOSINOPHIL NFR BLD AUTO: 0.9 % (ref 0–7)
EOSINOPHIL NFR BLD AUTO: 1 % (ref 0–7)
EOSINOPHIL NFR BLD AUTO: 1.3 % (ref 0–7)
EOSINOPHIL NFR BLD AUTO: 1.5 % (ref 0–7)
EOSINOPHIL NFR BLD AUTO: 2.1 % (ref 0–7)
EOSINOPHIL NFR BLD AUTO: 2.2 % (ref 0–7)
EOSINOPHIL NFR BLD AUTO: 3.7 % (ref 0–7)
ERYTHROCYTE [DISTWIDTH] IN BLOOD BY AUTOMATED COUNT: 16.9 % (ref 11.5–14.5)
ERYTHROCYTE [DISTWIDTH] IN BLOOD BY AUTOMATED COUNT: 17.1 % (ref 11.5–14.5)
ERYTHROCYTE [DISTWIDTH] IN BLOOD BY AUTOMATED COUNT: 17.2 % (ref 11.5–14.5)
ERYTHROCYTE [DISTWIDTH] IN BLOOD BY AUTOMATED COUNT: 17.5 % (ref 11.5–14.5)
ERYTHROCYTE [DISTWIDTH] IN BLOOD BY AUTOMATED COUNT: 17.7 % (ref 11.5–14.5)
ERYTHROCYTE [DISTWIDTH] IN BLOOD BY AUTOMATED COUNT: 17.7 % (ref 11.5–14.5)
ERYTHROCYTE [DISTWIDTH] IN BLOOD BY AUTOMATED COUNT: 17.8 % (ref 11.5–14.5)
ERYTHROCYTE [DISTWIDTH] IN BLOOD BY AUTOMATED COUNT: 17.8 % (ref 11.5–14.5)
ERYTHROCYTE [DISTWIDTH] IN BLOOD BY AUTOMATED COUNT: 18 % (ref 11.5–14.5)
ERYTHROCYTE [DISTWIDTH] IN BLOOD BY AUTOMATED COUNT: 18.1 % (ref 11.5–14.5)
ERYTHROCYTE [DISTWIDTH] IN BLOOD BY AUTOMATED COUNT: 18.4 % (ref 11.5–14.5)
ERYTHROCYTE [DISTWIDTH] IN BLOOD BY AUTOMATED COUNT: 18.4 % (ref 11.5–14.5)
ERYTHROCYTE [DISTWIDTH] IN BLOOD BY AUTOMATED COUNT: 18.5 % (ref 11.5–14.5)
ERYTHROCYTE [DISTWIDTH] IN BLOOD BY AUTOMATED COUNT: 18.7 % (ref 11.5–14.5)
FERRITIN SERPL-MCNC: 51.7 NG/ML (ref 11.1–264)
FERRITIN SERPL-MCNC: 78.6 NG/ML (ref 11.1–264)
FOLATE BLD-MCNC: 515.4 NG/ML
FOLATE RBC-MCNC: 1718 NG/ML
GASTROCULT GAST QL: NEGATIVE
GFR SERPL CREATININE-BSD FRML MDRD: 14 ML/MIN/1.73
GFR SERPL CREATININE-BSD FRML MDRD: 15 ML/MIN/1.73
GFR SERPL CREATININE-BSD FRML MDRD: 15 ML/MIN/1.73
GFR SERPL CREATININE-BSD FRML MDRD: 19 ML/MIN/1.73
GFR SERPL CREATININE-BSD FRML MDRD: 24 ML/MIN/1.73
GFR SERPL CREATININE-BSD FRML MDRD: 44 ML/MIN/1.73
GFR SERPL CREATININE-BSD FRML MDRD: 49 ML/MIN/1.73
GFR SERPL CREATININE-BSD FRML MDRD: 49 ML/MIN/1.73
GFR SERPL CREATININE-BSD FRML MDRD: 54 ML/MIN/1.73
GFR SERPL CREATININE-BSD FRML MDRD: 54 ML/MIN/1.73
GFR SERPL CREATININE-BSD FRML MDRD: 61 ML/MIN/1.73
GFR SERPL CREATININE-BSD FRML MDRD: 61 ML/MIN/1.73
GFR SERPL CREATININE-BSD FRML MDRD: 70 ML/MIN/1.73
GFR SERPL CREATININE-BSD FRML MDRD: 82 ML/MIN/1.73
GFR SERPL CREATININE-BSD FRML MDRD: 98 ML/MIN/1.73
GLOBULIN SER CALC-MCNC: 3.5 GM/DL
GLOBULIN UR ELPH-MCNC: 2.4 GM/DL
GLOBULIN UR ELPH-MCNC: 2.8 GM/DL
GLOBULIN UR ELPH-MCNC: 2.8 GM/DL
GLOBULIN UR ELPH-MCNC: 2.9 GM/DL
GLOBULIN UR ELPH-MCNC: 3 GM/DL
GLOBULIN UR ELPH-MCNC: 3 GM/DL
GLOBULIN UR ELPH-MCNC: 3.1 GM/DL
GLOBULIN UR ELPH-MCNC: 3.1 GM/DL
GLOBULIN UR ELPH-MCNC: 3.5 GM/DL
GLOBULIN UR ELPH-MCNC: 3.6 GM/DL
GLUCOSE BLD-MCNC: 112 MG/DL (ref 74–98)
GLUCOSE BLD-MCNC: 117 MG/DL (ref 74–98)
GLUCOSE BLD-MCNC: 122 MG/DL (ref 74–98)
GLUCOSE BLD-MCNC: 125 MG/DL (ref 74–98)
GLUCOSE BLD-MCNC: 151 MG/DL (ref 74–98)
GLUCOSE BLD-MCNC: 153 MG/DL (ref 74–98)
GLUCOSE BLD-MCNC: 156 MG/DL (ref 74–98)
GLUCOSE BLD-MCNC: 159 MG/DL (ref 74–98)
GLUCOSE BLD-MCNC: 163 MG/DL (ref 74–98)
GLUCOSE BLD-MCNC: 167 MG/DL (ref 74–98)
GLUCOSE BLD-MCNC: 167 MG/DL (ref 74–98)
GLUCOSE BLD-MCNC: 179 MG/DL (ref 74–98)
GLUCOSE BLD-MCNC: 201 MG/DL (ref 74–98)
GLUCOSE BLD-MCNC: 212 MG/DL (ref 74–98)
GLUCOSE BLD-MCNC: 264 MG/DL (ref 74–98)
GLUCOSE BLD-MCNC: 79 MG/DL (ref 74–98)
GLUCOSE BLD-MCNC: 81 MG/DL (ref 74–98)
GLUCOSE BLD-MCNC: 94 MG/DL (ref 74–98)
GLUCOSE BLD-MCNC: 96 MG/DL (ref 74–98)
GLUCOSE BLD-MCNC: 96 MG/DL (ref 74–98)
GLUCOSE BLD-MCNC: 98 MG/DL (ref 74–98)
GLUCOSE BLDC GLUCOMTR-MCNC: 100 MG/DL (ref 70–130)
GLUCOSE BLDC GLUCOMTR-MCNC: 104 MG/DL (ref 70–130)
GLUCOSE BLDC GLUCOMTR-MCNC: 109 MG/DL (ref 70–130)
GLUCOSE BLDC GLUCOMTR-MCNC: 112 MG/DL (ref 70–130)
GLUCOSE BLDC GLUCOMTR-MCNC: 112 MG/DL (ref 70–130)
GLUCOSE BLDC GLUCOMTR-MCNC: 113 MG/DL (ref 70–130)
GLUCOSE BLDC GLUCOMTR-MCNC: 115 MG/DL (ref 70–130)
GLUCOSE BLDC GLUCOMTR-MCNC: 115 MG/DL (ref 70–130)
GLUCOSE BLDC GLUCOMTR-MCNC: 118 MG/DL (ref 70–130)
GLUCOSE BLDC GLUCOMTR-MCNC: 121 MG/DL (ref 70–130)
GLUCOSE BLDC GLUCOMTR-MCNC: 122 MG/DL (ref 70–130)
GLUCOSE BLDC GLUCOMTR-MCNC: 123 MG/DL (ref 70–130)
GLUCOSE BLDC GLUCOMTR-MCNC: 124 MG/DL (ref 70–130)
GLUCOSE BLDC GLUCOMTR-MCNC: 131 MG/DL (ref 70–130)
GLUCOSE BLDC GLUCOMTR-MCNC: 137 MG/DL (ref 70–130)
GLUCOSE BLDC GLUCOMTR-MCNC: 142 MG/DL (ref 70–130)
GLUCOSE BLDC GLUCOMTR-MCNC: 143 MG/DL (ref 70–130)
GLUCOSE BLDC GLUCOMTR-MCNC: 144 MG/DL (ref 70–130)
GLUCOSE BLDC GLUCOMTR-MCNC: 149 MG/DL (ref 70–130)
GLUCOSE BLDC GLUCOMTR-MCNC: 149 MG/DL (ref 70–130)
GLUCOSE BLDC GLUCOMTR-MCNC: 158 MG/DL (ref 70–130)
GLUCOSE BLDC GLUCOMTR-MCNC: 163 MG/DL (ref 70–130)
GLUCOSE BLDC GLUCOMTR-MCNC: 165 MG/DL (ref 70–130)
GLUCOSE BLDC GLUCOMTR-MCNC: 165 MG/DL (ref 70–130)
GLUCOSE BLDC GLUCOMTR-MCNC: 174 MG/DL (ref 70–130)
GLUCOSE BLDC GLUCOMTR-MCNC: 176 MG/DL (ref 70–130)
GLUCOSE BLDC GLUCOMTR-MCNC: 180 MG/DL (ref 70–130)
GLUCOSE BLDC GLUCOMTR-MCNC: 180 MG/DL (ref 70–130)
GLUCOSE BLDC GLUCOMTR-MCNC: 182 MG/DL (ref 70–130)
GLUCOSE BLDC GLUCOMTR-MCNC: 184 MG/DL (ref 70–130)
GLUCOSE BLDC GLUCOMTR-MCNC: 185 MG/DL (ref 70–130)
GLUCOSE BLDC GLUCOMTR-MCNC: 189 MG/DL (ref 70–130)
GLUCOSE BLDC GLUCOMTR-MCNC: 190 MG/DL (ref 70–130)
GLUCOSE BLDC GLUCOMTR-MCNC: 191 MG/DL (ref 70–130)
GLUCOSE BLDC GLUCOMTR-MCNC: 194 MG/DL (ref 70–130)
GLUCOSE BLDC GLUCOMTR-MCNC: 197 MG/DL (ref 70–130)
GLUCOSE BLDC GLUCOMTR-MCNC: 197 MG/DL (ref 70–130)
GLUCOSE BLDC GLUCOMTR-MCNC: 199 MG/DL (ref 70–130)
GLUCOSE BLDC GLUCOMTR-MCNC: 199 MG/DL (ref 70–130)
GLUCOSE BLDC GLUCOMTR-MCNC: 200 MG/DL (ref 70–130)
GLUCOSE BLDC GLUCOMTR-MCNC: 201 MG/DL (ref 70–130)
GLUCOSE BLDC GLUCOMTR-MCNC: 202 MG/DL (ref 70–130)
GLUCOSE BLDC GLUCOMTR-MCNC: 205 MG/DL (ref 70–130)
GLUCOSE BLDC GLUCOMTR-MCNC: 221 MG/DL (ref 70–130)
GLUCOSE BLDC GLUCOMTR-MCNC: 224 MG/DL (ref 70–130)
GLUCOSE BLDC GLUCOMTR-MCNC: 227 MG/DL (ref 70–130)
GLUCOSE BLDC GLUCOMTR-MCNC: 228 MG/DL (ref 70–130)
GLUCOSE BLDC GLUCOMTR-MCNC: 230 MG/DL (ref 70–130)
GLUCOSE BLDC GLUCOMTR-MCNC: 248 MG/DL (ref 70–130)
GLUCOSE BLDC GLUCOMTR-MCNC: 264 MG/DL (ref 70–130)
GLUCOSE BLDC GLUCOMTR-MCNC: 265 MG/DL (ref 70–130)
GLUCOSE BLDC GLUCOMTR-MCNC: 265 MG/DL (ref 70–130)
GLUCOSE BLDC GLUCOMTR-MCNC: 268 MG/DL (ref 70–130)
GLUCOSE BLDC GLUCOMTR-MCNC: 272 MG/DL (ref 70–130)
GLUCOSE BLDC GLUCOMTR-MCNC: 273 MG/DL (ref 70–130)
GLUCOSE BLDC GLUCOMTR-MCNC: 276 MG/DL (ref 70–130)
GLUCOSE BLDC GLUCOMTR-MCNC: 276 MG/DL (ref 70–130)
GLUCOSE BLDC GLUCOMTR-MCNC: 289 MG/DL (ref 70–130)
GLUCOSE BLDC GLUCOMTR-MCNC: 317 MG/DL (ref 70–130)
GLUCOSE BLDC GLUCOMTR-MCNC: 323 MG/DL (ref 70–130)
GLUCOSE BLDC GLUCOMTR-MCNC: 413 MG/DL (ref 70–130)
GLUCOSE BLDC GLUCOMTR-MCNC: 74 MG/DL (ref 70–130)
GLUCOSE BLDC GLUCOMTR-MCNC: 82 MG/DL (ref 70–130)
GLUCOSE BLDC GLUCOMTR-MCNC: 88 MG/DL (ref 70–130)
GLUCOSE BLDC GLUCOMTR-MCNC: 89 MG/DL (ref 70–130)
GLUCOSE BLDC GLUCOMTR-MCNC: 93 MG/DL (ref 70–130)
GLUCOSE BLDC GLUCOMTR-MCNC: 96 MG/DL (ref 70–130)
GLUCOSE BLDC GLUCOMTR-MCNC: 96 MG/DL (ref 70–130)
GLUCOSE SERPL-MCNC: 167 MG/DL (ref 74–98)
GLUCOSE UR STRIP-MCNC: NEGATIVE MG/DL
GRAN CASTS URNS QL MICRO: ABNORMAL /LPF
GRAN CASTS URNS QL MICRO: ABNORMAL /LPF
HAPTOGLOB SERPL-MCNC: 139 MG/DL (ref 34–200)
HAV AB SER QL IA: POSITIVE
HAV IGM SERPL QL IA: NEGATIVE
HAV IGM SERPL QL IA: NEGATIVE
HBA1C MFR BLD: 7.5 % (ref 3–6)
HBV CORE AB SER DONR QL IA: NEGATIVE
HBV CORE IGM SERPL QL IA: NEGATIVE
HBV CORE IGM SERPL QL IA: NEGATIVE
HBV SURFACE AB SER QL: NON REACTIVE
HBV SURFACE AG SERPL QL IA: NEGATIVE
HBV SURFACE AG SERPL QL IA: NEGATIVE
HCO3 BLDA-SCNC: 23.7 MMOL/L (ref 22–28)
HCO3 BLDA-SCNC: 23.7 MMOL/L (ref 22–28)
HCT VFR BLD AUTO: 28.9 % (ref 37–47)
HCT VFR BLD AUTO: 29.1 % (ref 37–47)
HCT VFR BLD AUTO: 29.7 % (ref 37–47)
HCT VFR BLD AUTO: 29.7 % (ref 37–47)
HCT VFR BLD AUTO: 30 % (ref 34–46.6)
HCT VFR BLD AUTO: 30.3 % (ref 37–47)
HCT VFR BLD AUTO: 30.4 % (ref 37–47)
HCT VFR BLD AUTO: 30.5 % (ref 37–47)
HCT VFR BLD AUTO: 30.6 % (ref 37–47)
HCT VFR BLD AUTO: 30.7 % (ref 37–47)
HCT VFR BLD AUTO: 31.1 % (ref 37–47)
HCT VFR BLD AUTO: 31.3 % (ref 37–47)
HCT VFR BLD AUTO: 31.3 % (ref 37–47)
HCT VFR BLD AUTO: 31.5 % (ref 37–47)
HCT VFR BLD AUTO: 31.8 % (ref 37–47)
HCT VFR BLD AUTO: 31.8 % (ref 37–47)
HCT VFR BLD AUTO: 32.6 % (ref 37–47)
HCT VFR BLD AUTO: 32.7 % (ref 37–47)
HCT VFR BLD AUTO: 33.4 % (ref 37–47)
HCT VFR BLD AUTO: 34.9 % (ref 37–47)
HCT VFR BLD AUTO: 35.4 % (ref 37–47)
HCV AB S/CO SERPL IA: <0.1 S/CO RATIO (ref 0–0.9)
HCV AB S/CO SERPL IA: <0.1 S/CO RATIO (ref 0–0.9)
HDLC SERPL-MCNC: 38 MG/DL (ref 40–60)
HGB BLD-MCNC: 10.1 G/DL (ref 12–16)
HGB BLD-MCNC: 10.1 G/DL (ref 12–16)
HGB BLD-MCNC: 10.2 G/DL (ref 12–16)
HGB BLD-MCNC: 10.5 G/DL (ref 12–16)
HGB BLD-MCNC: 10.7 G/DL (ref 12–16)
HGB BLD-MCNC: 10.9 G/DL (ref 12–16)
HGB BLD-MCNC: 8.8 G/DL (ref 12–16)
HGB BLD-MCNC: 9 G/DL (ref 12–16)
HGB BLD-MCNC: 9.3 G/DL (ref 12–16)
HGB BLD-MCNC: 9.4 G/DL (ref 12–16)
HGB BLD-MCNC: 9.4 G/DL (ref 12–16)
HGB BLD-MCNC: 9.5 G/DL (ref 12–16)
HGB BLD-MCNC: 9.6 G/DL (ref 12–16)
HGB BLD-MCNC: 9.7 G/DL (ref 12–16)
HGB BLD-MCNC: 9.8 G/DL (ref 12–16)
HGB BLD-MCNC: 9.9 G/DL (ref 12–16)
HGB BLDA-MCNC: 9.9 G/DL (ref 12–18)
HGB BLDA-MCNC: 9.9 G/DL (ref 12–18)
HGB UR QL STRIP.AUTO: ABNORMAL
HGB UR QL STRIP.AUTO: NEGATIVE
HGB UR QL STRIP.AUTO: NEGATIVE
HOLD SPECIMEN: NORMAL
HOROWITZ INDEX BLD+IHG-RTO: 21 %
HOROWITZ INDEX BLD+IHG-RTO: 28 %
HYALINE CASTS UR QL AUTO: ABNORMAL /LPF
HYALINE CASTS UR QL AUTO: NORMAL /LPF
IMM GRANULOCYTES # BLD: 0.01 10*3/MM3 (ref 0–0.06)
IMM GRANULOCYTES # BLD: 0.02 10*3/MM3 (ref 0–0.06)
IMM GRANULOCYTES # BLD: 0.03 10*3/MM3 (ref 0–0.06)
IMM GRANULOCYTES # BLD: 0.04 10*3/MM3 (ref 0–0.06)
IMM GRANULOCYTES # BLD: 0.07 10*3/MM3 (ref 0–0.06)
IMM GRANULOCYTES NFR BLD: 0.1 % (ref 0–0.6)
IMM GRANULOCYTES NFR BLD: 0.2 % (ref 0–0.6)
IMM GRANULOCYTES NFR BLD: 0.3 % (ref 0–0.6)
IMM GRANULOCYTES NFR BLD: 0.4 % (ref 0–0.6)
IMM GRANULOCYTES NFR BLD: 0.5 % (ref 0–0.6)
IMM GRANULOCYTES NFR BLD: 0.6 % (ref 0–0.6)
IMM GRANULOCYTES NFR BLD: 0.6 % (ref 0–0.6)
IMM GRANULOCYTES NFR BLD: 0.8 % (ref 0–0.6)
INR PPP: 1.46 (ref 0.9–1.1)
INR PPP: 1.56 (ref 0.9–1.1)
INR PPP: 1.83 (ref 0.9–1.1)
IRON 24H UR-MRATE: 33 MCG/DL (ref 37–181)
IRON 24H UR-MRATE: <10 MCG/DL (ref 37–181)
IRON SATN MFR SERPL: 8 % (ref 11–46)
IRON SATN MFR SERPL: ABNORMAL % (ref 11–46)
KETONES UR QL STRIP: ABNORMAL
KETONES UR QL STRIP: NEGATIVE
KETONES UR QL: NEGATIVE
LDLC SERPL CALC-MCNC: 59 MG/DL (ref 0–99)
LDLC/HDLC SERPL: 1.55 {RATIO}
LEUKOCYTE EST, POC: NEGATIVE
LEUKOCYTE ESTERASE UR QL STRIP.AUTO: ABNORMAL
LEUKOCYTE ESTERASE UR QL STRIP.AUTO: NEGATIVE
LIPASE SERPL-CCNC: 153 U/L (ref 23–300)
LIPASE SERPL-CCNC: 162 U/L (ref 23–300)
LIPASE SERPL-CCNC: 97 U/L (ref 23–300)
LV EF 2D ECHO EST: 30 %
LYMPHOCYTES # BLD AUTO: 0.19 10*3/MM3 (ref 0.6–3.4)
LYMPHOCYTES # BLD AUTO: 0.42 10*3/MM3 (ref 0.6–3.4)
LYMPHOCYTES # BLD AUTO: 0.5 10*3/MM3 (ref 0.6–3.4)
LYMPHOCYTES # BLD AUTO: 0.61 10*3/MM3 (ref 0.6–3.4)
LYMPHOCYTES # BLD AUTO: 0.62 10*3/MM3 (ref 0.6–3.4)
LYMPHOCYTES # BLD AUTO: 0.7 10*3/MM3 (ref 0.6–3.4)
LYMPHOCYTES # BLD AUTO: 0.71 10*3/MM3 (ref 0.6–3.4)
LYMPHOCYTES # BLD AUTO: 0.72 10*3/MM3 (ref 0.6–3.4)
LYMPHOCYTES # BLD AUTO: 0.73 10*3/MM3 (ref 0.6–3.4)
LYMPHOCYTES # BLD AUTO: 0.84 10*3/MM3 (ref 0.6–3.4)
LYMPHOCYTES # BLD AUTO: 0.86 10*3/MM3 (ref 0.6–3.4)
LYMPHOCYTES # BLD AUTO: 0.87 10*3/MM3 (ref 0.6–3.4)
LYMPHOCYTES # BLD AUTO: 0.87 10*3/MM3 (ref 0.6–3.4)
LYMPHOCYTES # BLD AUTO: 0.89 10*3/MM3 (ref 0.6–3.4)
LYMPHOCYTES # BLD AUTO: 0.95 10*3/MM3 (ref 0.6–3.4)
LYMPHOCYTES # BLD AUTO: 1.07 10*3/MM3 (ref 0.6–3.4)
LYMPHOCYTES NFR BLD AUTO: 10 % (ref 10–50)
LYMPHOCYTES NFR BLD AUTO: 10.4 % (ref 10–50)
LYMPHOCYTES NFR BLD AUTO: 10.4 % (ref 10–50)
LYMPHOCYTES NFR BLD AUTO: 10.6 % (ref 10–50)
LYMPHOCYTES NFR BLD AUTO: 10.6 % (ref 10–50)
LYMPHOCYTES NFR BLD AUTO: 11.1 % (ref 10–50)
LYMPHOCYTES NFR BLD AUTO: 11.4 % (ref 10–50)
LYMPHOCYTES NFR BLD AUTO: 11.5 % (ref 10–50)
LYMPHOCYTES NFR BLD AUTO: 12.3 % (ref 10–50)
LYMPHOCYTES NFR BLD AUTO: 13.6 % (ref 10–50)
LYMPHOCYTES NFR BLD AUTO: 13.6 % (ref 10–50)
LYMPHOCYTES NFR BLD AUTO: 2.6 % (ref 10–50)
LYMPHOCYTES NFR BLD AUTO: 5.3 % (ref 10–50)
LYMPHOCYTES NFR BLD AUTO: 7.6 % (ref 10–50)
LYMPHOCYTES NFR BLD AUTO: 8.6 % (ref 10–50)
LYMPHOCYTES NFR BLD AUTO: 9 % (ref 10–50)
Lab: NORMAL
MAGNESIUM SERPL-MCNC: 1.8 MG/DL (ref 1.6–2.3)
MCH RBC QN AUTO: 24.9 PG (ref 27–31)
MCH RBC QN AUTO: 25 PG (ref 27–31)
MCH RBC QN AUTO: 25.1 PG (ref 27–31)
MCH RBC QN AUTO: 25.5 PG (ref 27–31)
MCH RBC QN AUTO: 26.2 PG (ref 27–31)
MCH RBC QN AUTO: 26.6 PG (ref 27–31)
MCH RBC QN AUTO: 27.1 PG (ref 27–31)
MCH RBC QN AUTO: 27.4 PG (ref 27–31)
MCH RBC QN AUTO: 27.6 PG (ref 27–31)
MCH RBC QN AUTO: 27.9 PG (ref 27–31)
MCH RBC QN AUTO: 28.1 PG (ref 27–31)
MCH RBC QN AUTO: 28.3 PG (ref 27–31)
MCH RBC QN AUTO: 28.6 PG (ref 27–31)
MCH RBC QN AUTO: 28.7 PG (ref 27–31)
MCH RBC QN AUTO: 28.8 PG (ref 27–31)
MCH RBC QN AUTO: 28.9 PG (ref 27–31)
MCH RBC QN AUTO: 29.1 PG (ref 27–31)
MCH RBC QN AUTO: 29.2 PG (ref 27–31)
MCHC RBC AUTO-ENTMCNC: 30.2 G/DL (ref 30–37)
MCHC RBC AUTO-ENTMCNC: 30.3 G/DL (ref 30–37)
MCHC RBC AUTO-ENTMCNC: 30.4 G/DL (ref 30–37)
MCHC RBC AUTO-ENTMCNC: 30.5 G/DL (ref 30–37)
MCHC RBC AUTO-ENTMCNC: 30.7 G/DL (ref 30–37)
MCHC RBC AUTO-ENTMCNC: 30.9 G/DL (ref 30–37)
MCHC RBC AUTO-ENTMCNC: 31 G/DL (ref 30–37)
MCHC RBC AUTO-ENTMCNC: 31 G/DL (ref 30–37)
MCHC RBC AUTO-ENTMCNC: 31.1 G/DL (ref 30–37)
MCHC RBC AUTO-ENTMCNC: 31.2 G/DL (ref 30–37)
MCHC RBC AUTO-ENTMCNC: 31.3 G/DL (ref 30–37)
MCHC RBC AUTO-ENTMCNC: 31.4 G/DL (ref 30–37)
MCHC RBC AUTO-ENTMCNC: 31.5 G/DL (ref 30–37)
MCHC RBC AUTO-ENTMCNC: 31.6 G/DL (ref 30–37)
MCHC RBC AUTO-ENTMCNC: 31.6 G/DL (ref 30–37)
MCHC RBC AUTO-ENTMCNC: 32 G/DL (ref 30–37)
MCHC RBC AUTO-ENTMCNC: 32.1 G/DL (ref 30–37)
MCHC RBC AUTO-ENTMCNC: 32.5 G/DL (ref 30–37)
MCV RBC AUTO: 79.4 FL (ref 81–99)
MCV RBC AUTO: 79.6 FL (ref 81–99)
MCV RBC AUTO: 80.6 FL (ref 81–99)
MCV RBC AUTO: 82.5 FL (ref 81–99)
MCV RBC AUTO: 84.1 FL (ref 81–99)
MCV RBC AUTO: 85.9 FL (ref 81–99)
MCV RBC AUTO: 87.8 FL (ref 81–99)
MCV RBC AUTO: 89.2 FL (ref 81–99)
MCV RBC AUTO: 89.4 FL (ref 81–99)
MCV RBC AUTO: 90.1 FL (ref 81–99)
MCV RBC AUTO: 91.2 FL (ref 81–99)
MCV RBC AUTO: 91.3 FL (ref 81–99)
MCV RBC AUTO: 91.6 FL (ref 81–99)
MCV RBC AUTO: 92 FL (ref 81–99)
MCV RBC AUTO: 92 FL (ref 81–99)
MCV RBC AUTO: 92.3 FL (ref 81–99)
MCV RBC AUTO: 92.7 FL (ref 81–99)
MCV RBC AUTO: 93 FL (ref 81–99)
MCV RBC AUTO: 93.3 FL (ref 81–99)
MCV RBC AUTO: 94.2 FL (ref 81–99)
METHYLMALONATE SERPL-SCNC: 173 NMOL/L (ref 0–378)
MODALITY: ABNORMAL
MODALITY: ABNORMAL
MONOCYTES # BLD AUTO: 0.41 10*3/MM3 (ref 0–0.9)
MONOCYTES # BLD AUTO: 0.48 10*3/MM3 (ref 0–0.9)
MONOCYTES # BLD AUTO: 0.52 10*3/MM3 (ref 0–0.9)
MONOCYTES # BLD AUTO: 0.53 10*3/MM3 (ref 0–0.9)
MONOCYTES # BLD AUTO: 0.62 10*3/MM3 (ref 0–0.9)
MONOCYTES # BLD AUTO: 0.63 10*3/MM3 (ref 0–0.9)
MONOCYTES # BLD AUTO: 0.65 10*3/MM3 (ref 0–0.9)
MONOCYTES # BLD AUTO: 0.71 10*3/MM3 (ref 0–0.9)
MONOCYTES # BLD AUTO: 0.72 10*3/MM3 (ref 0–0.9)
MONOCYTES # BLD AUTO: 0.77 10*3/MM3 (ref 0–0.9)
MONOCYTES # BLD AUTO: 0.85 10*3/MM3 (ref 0–0.9)
MONOCYTES # BLD AUTO: 0.87 10*3/MM3 (ref 0–0.9)
MONOCYTES # BLD AUTO: 0.93 10*3/MM3 (ref 0–0.9)
MONOCYTES # BLD AUTO: 0.94 10*3/MM3 (ref 0–0.9)
MONOCYTES # BLD AUTO: 1.02 10*3/MM3 (ref 0–0.9)
MONOCYTES # BLD AUTO: 1.16 10*3/MM3 (ref 0–0.9)
MONOCYTES NFR BLD AUTO: 10.3 % (ref 0–12)
MONOCYTES NFR BLD AUTO: 10.3 % (ref 0–12)
MONOCYTES NFR BLD AUTO: 11 % (ref 0–12)
MONOCYTES NFR BLD AUTO: 11.4 % (ref 0–12)
MONOCYTES NFR BLD AUTO: 11.5 % (ref 0–12)
MONOCYTES NFR BLD AUTO: 11.9 % (ref 0–12)
MONOCYTES NFR BLD AUTO: 13.3 % (ref 0–12)
MONOCYTES NFR BLD AUTO: 13.8 % (ref 0–12)
MONOCYTES NFR BLD AUTO: 5.5 % (ref 0–12)
MONOCYTES NFR BLD AUTO: 7.5 % (ref 0–12)
MONOCYTES NFR BLD AUTO: 7.8 % (ref 0–12)
MONOCYTES NFR BLD AUTO: 7.8 % (ref 0–12)
MONOCYTES NFR BLD AUTO: 9.1 % (ref 0–12)
MONOCYTES NFR BLD AUTO: 9.4 % (ref 0–12)
MONOCYTES NFR BLD AUTO: 9.7 % (ref 0–12)
MONOCYTES NFR BLD AUTO: 9.7 % (ref 0–12)
MRSA SPEC QL CULT: NORMAL
MUCOUS THREADS URNS QL MICRO: ABNORMAL /HPF
NEUTROPHILS # BLD AUTO: 4.53 10*3/MM3 (ref 2–6.9)
NEUTROPHILS # BLD AUTO: 4.67 10*3/MM3 (ref 2–6.9)
NEUTROPHILS # BLD AUTO: 4.69 10*3/MM3 (ref 2–6.9)
NEUTROPHILS # BLD AUTO: 4.75 10*3/MM3 (ref 2–6.9)
NEUTROPHILS # BLD AUTO: 4.79 10*3/MM3 (ref 2–6.9)
NEUTROPHILS # BLD AUTO: 5.13 10*3/MM3 (ref 2–6.9)
NEUTROPHILS # BLD AUTO: 5.19 10*3/MM3 (ref 2–6.9)
NEUTROPHILS # BLD AUTO: 5.72 10*3/MM3 (ref 2–6.9)
NEUTROPHILS # BLD AUTO: 5.95 10*3/MM3 (ref 2–6.9)
NEUTROPHILS # BLD AUTO: 6.25 10*3/MM3 (ref 2–6.9)
NEUTROPHILS # BLD AUTO: 6.4 10*3/MM3 (ref 2–6.9)
NEUTROPHILS # BLD AUTO: 6.53 10*3/MM3 (ref 2–6.9)
NEUTROPHILS # BLD AUTO: 6.73 10*3/MM3 (ref 2–6.9)
NEUTROPHILS # BLD AUTO: 6.8 10*3/MM3 (ref 2–6.9)
NEUTROPHILS # BLD AUTO: 6.82 10*3/MM3 (ref 2–6.9)
NEUTROPHILS # BLD AUTO: 6.97 10*3/MM3 (ref 2–6.9)
NEUTROPHILS NFR BLD AUTO: 71.9 % (ref 37–80)
NEUTROPHILS NFR BLD AUTO: 72.1 % (ref 37–80)
NEUTROPHILS NFR BLD AUTO: 74 % (ref 37–80)
NEUTROPHILS NFR BLD AUTO: 74.3 % (ref 37–80)
NEUTROPHILS NFR BLD AUTO: 75 % (ref 37–80)
NEUTROPHILS NFR BLD AUTO: 76.3 % (ref 37–80)
NEUTROPHILS NFR BLD AUTO: 76.9 % (ref 37–80)
NEUTROPHILS NFR BLD AUTO: 77.2 % (ref 37–80)
NEUTROPHILS NFR BLD AUTO: 77.4 % (ref 37–80)
NEUTROPHILS NFR BLD AUTO: 77.7 % (ref 37–80)
NEUTROPHILS NFR BLD AUTO: 78.3 % (ref 37–80)
NEUTROPHILS NFR BLD AUTO: 80.2 % (ref 37–80)
NEUTROPHILS NFR BLD AUTO: 82.3 % (ref 37–80)
NEUTROPHILS NFR BLD AUTO: 82.7 % (ref 37–80)
NEUTROPHILS NFR BLD AUTO: 84 % (ref 37–80)
NEUTROPHILS NFR BLD AUTO: 91.5 % (ref 37–80)
NITRITE UR QL STRIP: NEGATIVE
NITRITE UR-MCNC: NEGATIVE MG/ML
NRBC BLD AUTO-RTO: 0 /100 WBC (ref 0–0)
NRBC BLD MANUAL-RTO: 0 /100 WBC (ref 0–0)
NT-PROBNP SERPL-MCNC: 6150 PG/ML (ref 0–125)
NT-PROBNP SERPL-MCNC: 8740 PG/ML (ref 0–125)
PCO2 BLDA: 45.2 MM HG (ref 35–45)
PCO2 BLDA: 45.2 MM HG (ref 35–45)
PH BLDA: 7.33 PH UNITS
PH BLDA: 7.33 PH UNITS
PH UR STRIP.AUTO: 6 [PH] (ref 5–8)
PH UR STRIP.AUTO: 6 [PH] (ref 5–8)
PH UR STRIP.AUTO: 7 [PH] (ref 5–8)
PH UR STRIP.AUTO: 7 [PH] (ref 5–8)
PH UR STRIP.AUTO: 7.5 [PH] (ref 5–8)
PH UR STRIP.AUTO: <=5 [PH] (ref 5–8)
PH UR: 7 [PH] (ref 5–8)
PHOSPHATE SERPL-MCNC: 2.8 MG/DL (ref 2.5–4.5)
PHOSPHATE SERPL-MCNC: 3 MG/DL (ref 2.5–4.5)
PHOSPHATE SERPL-MCNC: 3.5 MG/DL (ref 2.5–4.5)
PHOSPHATE SERPL-MCNC: 4.1 MG/DL (ref 2.5–4.5)
PHOSPHATE SERPL-MCNC: 4.5 MG/DL (ref 2.5–4.5)
PHOSPHATE SERPL-MCNC: 5.4 MG/DL (ref 2.5–4.5)
PHOSPHATE SERPL-MCNC: 5.7 MG/DL (ref 2.5–4.5)
PLATELET # BLD AUTO: 164 10*3/MM3 (ref 130–400)
PLATELET # BLD AUTO: 165 10*3/MM3 (ref 130–400)
PLATELET # BLD AUTO: 193 10*3/MM3 (ref 130–400)
PLATELET # BLD AUTO: 200 10*3/MM3 (ref 130–400)
PLATELET # BLD AUTO: 205 10*3/MM3 (ref 130–400)
PLATELET # BLD AUTO: 209 10*3/MM3 (ref 130–400)
PLATELET # BLD AUTO: 216 10*3/MM3 (ref 130–400)
PLATELET # BLD AUTO: 221 10*3/MM3 (ref 130–400)
PLATELET # BLD AUTO: 224 10*3/MM3 (ref 130–400)
PLATELET # BLD AUTO: 224 10*3/MM3 (ref 130–400)
PLATELET # BLD AUTO: 226 10*3/MM3 (ref 130–400)
PLATELET # BLD AUTO: 234 10*3/MM3 (ref 130–400)
PLATELET # BLD AUTO: 235 10*3/MM3 (ref 130–400)
PLATELET # BLD AUTO: 236 10*3/MM3 (ref 130–400)
PLATELET # BLD AUTO: 242 10*3/MM3 (ref 130–400)
PLATELET # BLD AUTO: 242 10*3/MM3 (ref 130–400)
PLATELET # BLD AUTO: 244 10*3/MM3 (ref 130–400)
PLATELET # BLD AUTO: 244 10*3/MM3 (ref 130–400)
PLATELET # BLD AUTO: 266 10*3/MM3 (ref 130–400)
PLATELET # BLD AUTO: 337 10*3/MM3 (ref 130–400)
PMV BLD AUTO: 10.1 FL (ref 6–12)
PMV BLD AUTO: 10.3 FL (ref 6–12)
PMV BLD AUTO: 10.4 FL (ref 6–12)
PMV BLD AUTO: 10.5 FL (ref 6–12)
PMV BLD AUTO: 10.6 FL (ref 6–12)
PMV BLD AUTO: 10.6 FL (ref 6–12)
PMV BLD AUTO: 10.8 FL (ref 6–12)
PMV BLD AUTO: 10.8 FL (ref 6–12)
PMV BLD AUTO: 10.9 FL (ref 6–12)
PMV BLD AUTO: 10.9 FL (ref 6–12)
PMV BLD AUTO: 11.2 FL (ref 6–12)
PMV BLD AUTO: 11.3 FL (ref 6–12)
PMV BLD AUTO: 11.4 FL (ref 6–12)
PMV BLD AUTO: 11.5 FL (ref 6–12)
PMV BLD AUTO: 9.4 FL (ref 6–12)
PMV BLD AUTO: 9.9 FL (ref 6–12)
PMV BLD AUTO: 9.9 FL (ref 6–12)
PO2 BLDA: 92.5 MM HG (ref 75–100)
PO2 BLDA: 92.5 MM HG (ref 75–100)
POTASSIUM BLD-SCNC: 3.6 MMOL/L (ref 3.5–5.1)
POTASSIUM BLD-SCNC: 3.8 MMOL/L (ref 3.5–5.1)
POTASSIUM BLD-SCNC: 3.8 MMOL/L (ref 3.5–5.1)
POTASSIUM BLD-SCNC: 3.9 MMOL/L (ref 3.5–5.1)
POTASSIUM BLD-SCNC: 3.9 MMOL/L (ref 3.5–5.1)
POTASSIUM BLD-SCNC: 4 MMOL/L (ref 3.5–5.1)
POTASSIUM BLD-SCNC: 4.1 MMOL/L (ref 3.5–5.1)
POTASSIUM BLD-SCNC: 4.3 MMOL/L (ref 3.5–5.1)
POTASSIUM BLD-SCNC: 4.4 MMOL/L (ref 3.5–5.1)
POTASSIUM BLD-SCNC: 4.4 MMOL/L (ref 3.5–5.1)
POTASSIUM BLD-SCNC: 4.5 MMOL/L (ref 3.5–5.1)
POTASSIUM BLD-SCNC: 4.5 MMOL/L (ref 3.5–5.1)
POTASSIUM BLD-SCNC: 4.6 MMOL/L (ref 3.5–5.1)
POTASSIUM BLD-SCNC: 4.6 MMOL/L (ref 3.5–5.1)
POTASSIUM BLD-SCNC: 4.8 MMOL/L (ref 3.5–5.1)
POTASSIUM BLD-SCNC: 4.9 MMOL/L (ref 3.5–5.1)
POTASSIUM BLD-SCNC: 5 MMOL/L (ref 3.5–5.1)
POTASSIUM BLD-SCNC: 5.1 MMOL/L (ref 3.5–5.1)
POTASSIUM BLD-SCNC: 5.1 MMOL/L (ref 3.5–5.1)
POTASSIUM BLD-SCNC: 5.2 MMOL/L (ref 3.5–5.1)
POTASSIUM BLD-SCNC: 5.2 MMOL/L (ref 3.5–5.1)
POTASSIUM BLD-SCNC: 5.3 MMOL/L (ref 3.5–5.1)
POTASSIUM BLD-SCNC: 5.4 MMOL/L (ref 3.5–5.1)
POTASSIUM BLD-SCNC: 5.5 MMOL/L (ref 3.5–5.1)
POTASSIUM BLD-SCNC: 5.9 MMOL/L (ref 3.5–5.1)
POTASSIUM BLD-SCNC: 6.1 MMOL/L (ref 3.5–5.1)
POTASSIUM SERPL-SCNC: 4.3 MMOL/L (ref 3.5–5.1)
PROT SERPL-MCNC: 5.5 G/DL (ref 6.3–8.2)
PROT SERPL-MCNC: 5.9 G/DL (ref 6.3–8.2)
PROT SERPL-MCNC: 6.5 G/DL (ref 6.3–8.2)
PROT SERPL-MCNC: 6.7 G/DL (ref 6.3–8.2)
PROT SERPL-MCNC: 6.8 G/DL (ref 6.3–8.2)
PROT SERPL-MCNC: 6.8 G/DL (ref 6.3–8.2)
PROT SERPL-MCNC: 6.9 G/DL (ref 6.3–8.2)
PROT SERPL-MCNC: 6.9 G/DL (ref 6.3–8.2)
PROT SERPL-MCNC: 7.1 G/DL (ref 6.3–8.2)
PROT SERPL-MCNC: 7.2 G/DL (ref 6.3–8.2)
PROT SERPL-MCNC: 7.3 G/DL (ref 6.3–8.2)
PROT SERPL-MCNC: 7.3 G/DL (ref 6.3–8.2)
PROT UR QL STRIP: ABNORMAL
PROT UR QL STRIP: NEGATIVE
PROT UR STRIP-MCNC: ABNORMAL MG/DL
PROTHROMBIN TIME: 16 SECONDS (ref 9.3–12.1)
PROTHROMBIN TIME: 17.1 SECONDS (ref 9.3–12.1)
PROTHROMBIN TIME: 20.1 SECONDS (ref 9.3–12.1)
RBC # BLD AUTO: 3.19 10*6/MM3 (ref 4.2–5.4)
RBC # BLD AUTO: 3.23 10*6/MM3 (ref 4.2–5.4)
RBC # BLD AUTO: 3.26 10*6/MM3 (ref 4.2–5.4)
RBC # BLD AUTO: 3.27 10*6/MM3 (ref 4.2–5.4)
RBC # BLD AUTO: 3.29 10*6/MM3 (ref 4.2–5.4)
RBC # BLD AUTO: 3.42 10*6/MM3 (ref 4.2–5.4)
RBC # BLD AUTO: 3.43 10*6/MM3 (ref 4.2–5.4)
RBC # BLD AUTO: 3.43 10*6/MM3 (ref 4.2–5.4)
RBC # BLD AUTO: 3.46 10*6/MM3 (ref 4.2–5.4)
RBC # BLD AUTO: 3.5 10*6/MM3 (ref 4.2–5.4)
RBC # BLD AUTO: 3.53 10*6/MM3 (ref 4.2–5.4)
RBC # BLD AUTO: 3.57 10*6/MM3 (ref 4.2–5.4)
RBC # BLD AUTO: 3.62 10*6/MM3 (ref 4.2–5.4)
RBC # BLD AUTO: 3.63 10*6/MM3 (ref 4.2–5.4)
RBC # BLD AUTO: 3.65 10*6/MM3 (ref 4.2–5.4)
RBC # BLD AUTO: 3.73 10*6/MM3 (ref 4.2–5.4)
RBC # BLD AUTO: 3.78 10*6/MM3 (ref 4.2–5.4)
RBC # BLD AUTO: 3.84 10*6/MM3 (ref 4.2–5.4)
RBC # BLD AUTO: 3.91 10*6/MM3 (ref 4.2–5.4)
RBC # BLD AUTO: 4.29 10*6/MM3 (ref 4.2–5.4)
RBC # UR STRIP: NEGATIVE /UL
RBC # UR: ABNORMAL /HPF
RBC # UR: NORMAL /HPF
REF LAB TEST METHOD: ABNORMAL
REF LAB TEST METHOD: NORMAL
RETICS #: 0.14 10*6/MM3 (ref 0.02–0.13)
RETICS/RBC NFR AUTO: 4.21 % (ref 0.5–1.5)
SAO2 % BLDCOA: 97.1 %
SAO2 % BLDCOA: 97.1 %
SODIUM BLD-SCNC: 121 MMOL/L (ref 137–145)
SODIUM BLD-SCNC: 122 MMOL/L (ref 137–145)
SODIUM BLD-SCNC: 125 MMOL/L (ref 137–145)
SODIUM BLD-SCNC: 128 MMOL/L (ref 137–145)
SODIUM BLD-SCNC: 129 MMOL/L (ref 137–145)
SODIUM BLD-SCNC: 130 MMOL/L (ref 137–145)
SODIUM BLD-SCNC: 131 MMOL/L (ref 137–145)
SODIUM BLD-SCNC: 132 MMOL/L (ref 137–145)
SODIUM BLD-SCNC: 132 MMOL/L (ref 137–145)
SODIUM BLD-SCNC: 133 MMOL/L (ref 137–145)
SODIUM BLD-SCNC: 135 MMOL/L (ref 137–145)
SODIUM BLD-SCNC: 135 MMOL/L (ref 137–145)
SODIUM BLD-SCNC: 138 MMOL/L (ref 137–145)
SODIUM BLD-SCNC: 140 MMOL/L (ref 137–145)
SODIUM BLD-SCNC: 142 MMOL/L (ref 137–145)
SODIUM BLD-SCNC: 143 MMOL/L (ref 137–145)
SODIUM BLD-SCNC: 146 MMOL/L (ref 137–145)
SODIUM SERPL-SCNC: 135 MMOL/L (ref 137–145)
SODIUM UR-SCNC: 19 MMOL/L (ref 30–90)
SP GR UR STRIP: 1.01 (ref 1–1.03)
SP GR UR STRIP: 1.02 (ref 1–1.03)
SP GR UR STRIP: 1.02 (ref 1–1.03)
SP GR UR STRIP: <=1.005 (ref 1–1.03)
SP GR UR: 1.01 (ref 1–1.03)
SQUAMOUS #/AREA URNS HPF: ABNORMAL /HPF
SQUAMOUS #/AREA URNS HPF: NORMAL /HPF
TIBC SERPL-MCNC: 418 MCG/DL (ref 261–497)
TIBC SERPL-MCNC: 423 MCG/DL (ref 261–497)
TRIGL SERPL-MCNC: 75 MG/DL
TROPONIN I SERPL-MCNC: 0.01 NG/ML (ref 0–0.03)
TROPONIN I SERPL-MCNC: 0.02 NG/ML (ref 0–0.03)
TROPONIN I SERPL-MCNC: 0.02 NG/ML (ref 0–0.03)
TROPONIN I SERPL-MCNC: 0.04 NG/ML (ref 0–0.03)
TSH SERPL DL<=0.05 MIU/L-ACNC: 1.37 MIU/ML (ref 0.47–4.68)
TSH SERPL DL<=0.05 MIU/L-ACNC: 3.16 MIU/ML (ref 0.47–4.68)
UROBILINOGEN UR QL STRIP: ABNORMAL
UROBILINOGEN UR QL: NORMAL
VIT B12 BLD-MCNC: >1000 PG/ML (ref 239–931)
VIT B12 BLD-MCNC: >1000 PG/ML (ref 239–931)
VLDLC SERPL-MCNC: 15 MG/DL
VRE SPEC QL CULT: NORMAL
WBC # BLD AUTO: 6.72 10*3/MM3 (ref 4.8–10.8)
WBC NRBC COR # BLD: 5.46 10*3/MM3 (ref 4.8–10.8)
WBC NRBC COR # BLD: 5.82 10*3/MM3 (ref 4.8–10.8)
WBC NRBC COR # BLD: 6.09 10*3/MM3 (ref 4.8–10.8)
WBC NRBC COR # BLD: 6.16 10*3/MM3 (ref 4.8–10.8)
WBC NRBC COR # BLD: 6.29 10*3/MM3 (ref 4.8–10.8)
WBC NRBC COR # BLD: 6.31 10*3/MM3 (ref 4.8–10.8)
WBC NRBC COR # BLD: 6.39 10*3/MM3 (ref 4.8–10.8)
WBC NRBC COR # BLD: 6.76 10*3/MM3 (ref 4.8–10.8)
WBC NRBC COR # BLD: 6.92 10*3/MM3 (ref 4.8–10.8)
WBC NRBC COR # BLD: 7.01 10*3/MM3 (ref 4.8–10.8)
WBC NRBC COR # BLD: 7.45 10*3/MM3 (ref 4.8–10.8)
WBC NRBC COR # BLD: 7.59 10*3/MM3 (ref 4.8–10.8)
WBC NRBC COR # BLD: 7.93 10*3/MM3 (ref 4.8–10.8)
WBC NRBC COR # BLD: 8.03 10*3/MM3 (ref 4.8–10.8)
WBC NRBC COR # BLD: 8.24 10*3/MM3 (ref 4.8–10.8)
WBC NRBC COR # BLD: 8.5 10*3/MM3 (ref 4.8–10.8)
WBC NRBC COR # BLD: 8.7 10*3/MM3 (ref 4.8–10.8)
WBC NRBC COR # BLD: 8.86 10*3/MM3 (ref 4.8–10.8)
WBC NRBC COR # BLD: 9 10*3/MM3 (ref 4.8–10.8)
WBC UR QL AUTO: ABNORMAL /HPF
WBC UR QL AUTO: NORMAL /HPF
WHOLE BLOOD HOLD SPECIMEN: NORMAL

## 2017-01-01 PROCEDURE — 80053 COMPREHEN METABOLIC PANEL: CPT | Performed by: EMERGENCY MEDICINE

## 2017-01-01 PROCEDURE — 84484 ASSAY OF TROPONIN QUANT: CPT | Performed by: PHYSICIAN ASSISTANT

## 2017-01-01 PROCEDURE — 97530 THERAPEUTIC ACTIVITIES: CPT

## 2017-01-01 PROCEDURE — 92526 ORAL FUNCTION THERAPY: CPT

## 2017-01-01 PROCEDURE — 25010000002 MORPHINE PER 10 MG: Performed by: EMERGENCY MEDICINE

## 2017-01-01 PROCEDURE — 85610 PROTHROMBIN TIME: CPT | Performed by: INTERNAL MEDICINE

## 2017-01-01 PROCEDURE — 82962 GLUCOSE BLOOD TEST: CPT

## 2017-01-01 PROCEDURE — 99223 1ST HOSP IP/OBS HIGH 75: CPT | Performed by: INTERNAL MEDICINE

## 2017-01-01 PROCEDURE — 80048 BASIC METABOLIC PNL TOTAL CA: CPT | Performed by: FAMILY MEDICINE

## 2017-01-01 PROCEDURE — 99232 SBSQ HOSP IP/OBS MODERATE 35: CPT | Performed by: INTERNAL MEDICINE

## 2017-01-01 PROCEDURE — 87086 URINE CULTURE/COLONY COUNT: CPT | Performed by: EMERGENCY MEDICINE

## 2017-01-01 PROCEDURE — 97166 OT EVAL MOD COMPLEX 45 MIN: CPT

## 2017-01-01 PROCEDURE — 99231 SBSQ HOSP IP/OBS SF/LOW 25: CPT | Performed by: HOSPITALIST

## 2017-01-01 PROCEDURE — 99232 SBSQ HOSP IP/OBS MODERATE 35: CPT | Performed by: FAMILY MEDICINE

## 2017-01-01 PROCEDURE — 99222 1ST HOSP IP/OBS MODERATE 55: CPT | Performed by: INTERNAL MEDICINE

## 2017-01-01 PROCEDURE — 25010000002 FUROSEMIDE PER 20 MG: Performed by: INTERNAL MEDICINE

## 2017-01-01 PROCEDURE — 82550 ASSAY OF CK (CPK): CPT | Performed by: EMERGENCY MEDICINE

## 2017-01-01 PROCEDURE — 84300 ASSAY OF URINE SODIUM: CPT | Performed by: INTERNAL MEDICINE

## 2017-01-01 PROCEDURE — 77063 BREAST TOMOSYNTHESIS BI: CPT

## 2017-01-01 PROCEDURE — 85025 COMPLETE CBC W/AUTO DIFF WBC: CPT | Performed by: PHYSICIAN ASSISTANT

## 2017-01-01 PROCEDURE — 99233 SBSQ HOSP IP/OBS HIGH 50: CPT | Performed by: FAMILY MEDICINE

## 2017-01-01 PROCEDURE — 94660 CPAP INITIATION&MGMT: CPT

## 2017-01-01 PROCEDURE — 86235 NUCLEAR ANTIGEN ANTIBODY: CPT | Performed by: INTERNAL MEDICINE

## 2017-01-01 PROCEDURE — 99285 EMERGENCY DEPT VISIT HI MDM: CPT

## 2017-01-01 PROCEDURE — 80069 RENAL FUNCTION PANEL: CPT | Performed by: INTERNAL MEDICINE

## 2017-01-01 PROCEDURE — 82550 ASSAY OF CK (CPK): CPT | Performed by: FAMILY MEDICINE

## 2017-01-01 PROCEDURE — 71010 HC CHEST PA OR AP: CPT

## 2017-01-01 PROCEDURE — 86225 DNA ANTIBODY NATIVE: CPT | Performed by: INTERNAL MEDICINE

## 2017-01-01 PROCEDURE — 94799 UNLISTED PULMONARY SVC/PX: CPT

## 2017-01-01 PROCEDURE — 80048 BASIC METABOLIC PNL TOTAL CA: CPT | Performed by: HOSPITALIST

## 2017-01-01 PROCEDURE — 63710000001 INSULIN DETEMIR PER 5 UNITS: Performed by: INTERNAL MEDICINE

## 2017-01-01 PROCEDURE — 70450 CT HEAD/BRAIN W/O DYE: CPT

## 2017-01-01 PROCEDURE — 36415 COLL VENOUS BLD VENIPUNCTURE: CPT

## 2017-01-01 PROCEDURE — 93005 ELECTROCARDIOGRAM TRACING: CPT

## 2017-01-01 PROCEDURE — 97162 PT EVAL MOD COMPLEX 30 MIN: CPT

## 2017-01-01 PROCEDURE — 74176 CT ABD & PELVIS W/O CONTRAST: CPT

## 2017-01-01 PROCEDURE — 96361 HYDRATE IV INFUSION ADD-ON: CPT

## 2017-01-01 PROCEDURE — 85025 COMPLETE CBC W/AUTO DIFF WBC: CPT | Performed by: EMERGENCY MEDICINE

## 2017-01-01 PROCEDURE — 25010000002 ONDANSETRON PER 1 MG: Performed by: FAMILY MEDICINE

## 2017-01-01 PROCEDURE — G0180 MD CERTIFICATION HHA PATIENT: HCPCS | Performed by: FAMILY MEDICINE

## 2017-01-01 PROCEDURE — 99232 SBSQ HOSP IP/OBS MODERATE 35: CPT | Performed by: HOSPITALIST

## 2017-01-01 PROCEDURE — 83690 ASSAY OF LIPASE: CPT | Performed by: EMERGENCY MEDICINE

## 2017-01-01 PROCEDURE — 94640 AIRWAY INHALATION TREATMENT: CPT

## 2017-01-01 PROCEDURE — 97116 GAIT TRAINING THERAPY: CPT

## 2017-01-01 PROCEDURE — 82728 ASSAY OF FERRITIN: CPT | Performed by: INTERNAL MEDICINE

## 2017-01-01 PROCEDURE — 71250 CT THORAX DX C-: CPT

## 2017-01-01 PROCEDURE — 85025 COMPLETE CBC W/AUTO DIFF WBC: CPT | Performed by: INTERNAL MEDICINE

## 2017-01-01 PROCEDURE — 25010000002 ENOXAPARIN PER 10 MG: Performed by: INTERNAL MEDICINE

## 2017-01-01 PROCEDURE — 86709 HEPATITIS A IGM ANTIBODY: CPT | Performed by: INTERNAL MEDICINE

## 2017-01-01 PROCEDURE — 82607 VITAMIN B-12: CPT | Performed by: INTERNAL MEDICINE

## 2017-01-01 PROCEDURE — 83540 ASSAY OF IRON: CPT | Performed by: INTERNAL MEDICINE

## 2017-01-01 PROCEDURE — 84484 ASSAY OF TROPONIN QUANT: CPT | Performed by: EMERGENCY MEDICINE

## 2017-01-01 PROCEDURE — 99214 OFFICE O/P EST MOD 30 MIN: CPT | Performed by: PHYSICIAN ASSISTANT

## 2017-01-01 PROCEDURE — 84132 ASSAY OF SERUM POTASSIUM: CPT | Performed by: INTERNAL MEDICINE

## 2017-01-01 PROCEDURE — 63710000001 INSULIN REGULAR HUMAN PER 5 UNITS: Performed by: INTERNAL MEDICINE

## 2017-01-01 PROCEDURE — 99284 EMERGENCY DEPT VISIT MOD MDM: CPT

## 2017-01-01 PROCEDURE — 81003 URINALYSIS AUTO W/O SCOPE: CPT | Performed by: PHYSICIAN ASSISTANT

## 2017-01-01 PROCEDURE — 86803 HEPATITIS C AB TEST: CPT | Performed by: INTERNAL MEDICINE

## 2017-01-01 PROCEDURE — 72070 X-RAY EXAM THORAC SPINE 2VWS: CPT

## 2017-01-01 PROCEDURE — 63710000001 INSULIN ASPART PER 5 UNITS: Performed by: INTERNAL MEDICINE

## 2017-01-01 PROCEDURE — 85025 COMPLETE CBC W/AUTO DIFF WBC: CPT | Performed by: HOSPITALIST

## 2017-01-01 PROCEDURE — 82103 ALPHA-1-ANTITRYPSIN TOTAL: CPT | Performed by: INTERNAL MEDICINE

## 2017-01-01 PROCEDURE — 83516 IMMUNOASSAY NONANTIBODY: CPT | Performed by: INTERNAL MEDICINE

## 2017-01-01 PROCEDURE — 93306 TTE W/DOPPLER COMPLETE: CPT

## 2017-01-01 PROCEDURE — 81001 URINALYSIS AUTO W/SCOPE: CPT | Performed by: PHYSICIAN ASSISTANT

## 2017-01-01 PROCEDURE — 96374 THER/PROPH/DIAG INJ IV PUSH: CPT

## 2017-01-01 PROCEDURE — 85027 COMPLETE CBC AUTOMATED: CPT | Performed by: HOSPITALIST

## 2017-01-01 PROCEDURE — 93005 ELECTROCARDIOGRAM TRACING: CPT | Performed by: EMERGENCY MEDICINE

## 2017-01-01 PROCEDURE — 97161 PT EVAL LOW COMPLEX 20 MIN: CPT

## 2017-01-01 PROCEDURE — 63710000001 INSULIN ASPART PER 5 UNITS: Performed by: FAMILY MEDICINE

## 2017-01-01 PROCEDURE — 93005 ELECTROCARDIOGRAM TRACING: CPT | Performed by: PHYSICIAN ASSISTANT

## 2017-01-01 PROCEDURE — 99223 1ST HOSP IP/OBS HIGH 75: CPT | Performed by: PSYCHIATRY & NEUROLOGY

## 2017-01-01 PROCEDURE — 85610 PROTHROMBIN TIME: CPT | Performed by: EMERGENCY MEDICINE

## 2017-01-01 PROCEDURE — 85025 COMPLETE CBC W/AUTO DIFF WBC: CPT | Performed by: STUDENT IN AN ORGANIZED HEALTH CARE EDUCATION/TRAINING PROGRAM

## 2017-01-01 PROCEDURE — 82140 ASSAY OF AMMONIA: CPT | Performed by: INTERNAL MEDICINE

## 2017-01-01 PROCEDURE — 63710000001 INSULIN DETEMIR PER 5 UNITS: Performed by: FAMILY MEDICINE

## 2017-01-01 PROCEDURE — 83036 HEMOGLOBIN GLYCOSYLATED A1C: CPT | Performed by: INTERNAL MEDICINE

## 2017-01-01 PROCEDURE — 80074 ACUTE HEPATITIS PANEL: CPT | Performed by: FAMILY MEDICINE

## 2017-01-01 PROCEDURE — 81001 URINALYSIS AUTO W/SCOPE: CPT | Performed by: STUDENT IN AN ORGANIZED HEALTH CARE EDUCATION/TRAINING PROGRAM

## 2017-01-01 PROCEDURE — 86706 HEP B SURFACE ANTIBODY: CPT | Performed by: INTERNAL MEDICINE

## 2017-01-01 PROCEDURE — 81001 URINALYSIS AUTO W/SCOPE: CPT | Performed by: EMERGENCY MEDICINE

## 2017-01-01 PROCEDURE — 87340 HEPATITIS B SURFACE AG IA: CPT | Performed by: INTERNAL MEDICINE

## 2017-01-01 PROCEDURE — 86708 HEPATITIS A ANTIBODY: CPT | Performed by: INTERNAL MEDICINE

## 2017-01-01 PROCEDURE — 71020 HC CHEST PA AND LATERAL: CPT

## 2017-01-01 PROCEDURE — 85014 HEMATOCRIT: CPT | Performed by: INTERNAL MEDICINE

## 2017-01-01 PROCEDURE — 83690 ASSAY OF LIPASE: CPT | Performed by: PHYSICIAN ASSISTANT

## 2017-01-01 PROCEDURE — 83550 IRON BINDING TEST: CPT | Performed by: INTERNAL MEDICINE

## 2017-01-01 PROCEDURE — 82805 BLOOD GASES W/O2 SATURATION: CPT

## 2017-01-01 PROCEDURE — 96375 TX/PRO/DX INJ NEW DRUG ADDON: CPT

## 2017-01-01 PROCEDURE — 83690 ASSAY OF LIPASE: CPT | Performed by: INTERNAL MEDICINE

## 2017-01-01 PROCEDURE — 97110 THERAPEUTIC EXERCISES: CPT

## 2017-01-01 PROCEDURE — 76705 ECHO EXAM OF ABDOMEN: CPT

## 2017-01-01 PROCEDURE — 25010000002 CALCIUM GLUCONATE PER 10 ML: Performed by: EMERGENCY MEDICINE

## 2017-01-01 PROCEDURE — 85025 COMPLETE CBC W/AUTO DIFF WBC: CPT | Performed by: FAMILY MEDICINE

## 2017-01-01 PROCEDURE — 82550 ASSAY OF CK (CPK): CPT | Performed by: INTERNAL MEDICINE

## 2017-01-01 PROCEDURE — 25010000002 FUROSEMIDE PER 20 MG: Performed by: EMERGENCY MEDICINE

## 2017-01-01 PROCEDURE — 25010000002 DIAZEPAM PER 5 MG: Performed by: INTERNAL MEDICINE

## 2017-01-01 PROCEDURE — 92611 MOTION FLUOROSCOPY/SWALLOW: CPT

## 2017-01-01 PROCEDURE — 25010000002 ONDANSETRON PER 1 MG: Performed by: EMERGENCY MEDICINE

## 2017-01-01 PROCEDURE — 84100 ASSAY OF PHOSPHORUS: CPT | Performed by: HOSPITALIST

## 2017-01-01 PROCEDURE — 72125 CT NECK SPINE W/O DYE: CPT

## 2017-01-01 PROCEDURE — 25010000002 DIPHENHYDRAMINE PER 50 MG: Performed by: INTERNAL MEDICINE

## 2017-01-01 PROCEDURE — 93971 EXTREMITY STUDY: CPT

## 2017-01-01 PROCEDURE — 25010000002 PROMETHAZINE PER 50 MG: Performed by: EMERGENCY MEDICINE

## 2017-01-01 PROCEDURE — 86704 HEP B CORE ANTIBODY TOTAL: CPT | Performed by: INTERNAL MEDICINE

## 2017-01-01 PROCEDURE — 99283 EMERGENCY DEPT VISIT LOW MDM: CPT

## 2017-01-01 PROCEDURE — 80053 COMPREHEN METABOLIC PANEL: CPT | Performed by: PHYSICIAN ASSISTANT

## 2017-01-01 PROCEDURE — 85027 COMPLETE CBC AUTOMATED: CPT | Performed by: INTERNAL MEDICINE

## 2017-01-01 PROCEDURE — 99239 HOSP IP/OBS DSCHRG MGMT >30: CPT | Performed by: FAMILY MEDICINE

## 2017-01-01 PROCEDURE — 80053 COMPREHEN METABOLIC PANEL: CPT | Performed by: INTERNAL MEDICINE

## 2017-01-01 PROCEDURE — 82728 ASSAY OF FERRITIN: CPT

## 2017-01-01 PROCEDURE — 99239 HOSP IP/OBS DSCHRG MGMT >30: CPT | Performed by: HOSPITALIST

## 2017-01-01 PROCEDURE — 82270 OCCULT BLOOD FECES: CPT | Performed by: INTERNAL MEDICINE

## 2017-01-01 PROCEDURE — 83921 ORGANIC ACID SINGLE QUANT: CPT | Performed by: INTERNAL MEDICINE

## 2017-01-01 PROCEDURE — 82553 CREATINE MB FRACTION: CPT | Performed by: EMERGENCY MEDICINE

## 2017-01-01 PROCEDURE — 25010000002 DIPHENHYDRAMINE PER 50 MG: Performed by: EMERGENCY MEDICINE

## 2017-01-01 PROCEDURE — 80053 COMPREHEN METABOLIC PANEL: CPT | Performed by: STUDENT IN AN ORGANIZED HEALTH CARE EDUCATION/TRAINING PROGRAM

## 2017-01-01 PROCEDURE — 99204 OFFICE O/P NEW MOD 45 MIN: CPT | Performed by: PSYCHIATRY & NEUROLOGY

## 2017-01-01 PROCEDURE — 93306 TTE W/DOPPLER COMPLETE: CPT | Performed by: INTERNAL MEDICINE

## 2017-01-01 PROCEDURE — 83010 ASSAY OF HAPTOGLOBIN QUANT: CPT | Performed by: INTERNAL MEDICINE

## 2017-01-01 PROCEDURE — 87081 CULTURE SCREEN ONLY: CPT | Performed by: INTERNAL MEDICINE

## 2017-01-01 PROCEDURE — 99222 1ST HOSP IP/OBS MODERATE 55: CPT | Performed by: FAMILY MEDICINE

## 2017-01-01 PROCEDURE — 72100 X-RAY EXAM L-S SPINE 2/3 VWS: CPT

## 2017-01-01 PROCEDURE — 85045 AUTOMATED RETICULOCYTE COUNT: CPT | Performed by: INTERNAL MEDICINE

## 2017-01-01 PROCEDURE — 81001 URINALYSIS AUTO W/SCOPE: CPT | Performed by: INTERNAL MEDICINE

## 2017-01-01 PROCEDURE — 80076 HEPATIC FUNCTION PANEL: CPT | Performed by: HOSPITALIST

## 2017-01-01 PROCEDURE — 99222 1ST HOSP IP/OBS MODERATE 55: CPT | Performed by: HOSPITALIST

## 2017-01-01 PROCEDURE — 82747 ASSAY OF FOLIC ACID RBC: CPT | Performed by: INTERNAL MEDICINE

## 2017-01-01 PROCEDURE — 92610 EVALUATE SWALLOWING FUNCTION: CPT

## 2017-01-01 PROCEDURE — 36600 WITHDRAWAL OF ARTERIAL BLOOD: CPT

## 2017-01-01 PROCEDURE — 83880 ASSAY OF NATRIURETIC PEPTIDE: CPT | Performed by: EMERGENCY MEDICINE

## 2017-01-01 PROCEDURE — 97165 OT EVAL LOW COMPLEX 30 MIN: CPT

## 2017-01-01 PROCEDURE — 84443 ASSAY THYROID STIM HORMONE: CPT | Performed by: INTERNAL MEDICINE

## 2017-01-01 PROCEDURE — 84100 ASSAY OF PHOSPHORUS: CPT | Performed by: INTERNAL MEDICINE

## 2017-01-01 PROCEDURE — 99214 OFFICE O/P EST MOD 30 MIN: CPT | Performed by: FAMILY MEDICINE

## 2017-01-01 PROCEDURE — 93005 ELECTROCARDIOGRAM TRACING: CPT | Performed by: FAMILY MEDICINE

## 2017-01-01 PROCEDURE — 96360 HYDRATION IV INFUSION INIT: CPT

## 2017-01-01 PROCEDURE — 80053 COMPREHEN METABOLIC PANEL: CPT | Performed by: HOSPITALIST

## 2017-01-01 PROCEDURE — 85027 COMPLETE CBC AUTOMATED: CPT | Performed by: FAMILY MEDICINE

## 2017-01-01 PROCEDURE — 86705 HEP B CORE ANTIBODY IGM: CPT | Performed by: INTERNAL MEDICINE

## 2017-01-01 PROCEDURE — 74230 X-RAY XM SWLNG FUNCJ C+: CPT

## 2017-01-01 PROCEDURE — G0202 SCR MAMMO BI INCL CAD: HCPCS

## 2017-01-01 PROCEDURE — 80061 LIPID PANEL: CPT | Performed by: INTERNAL MEDICINE

## 2017-01-01 PROCEDURE — 83735 ASSAY OF MAGNESIUM: CPT | Performed by: INTERNAL MEDICINE

## 2017-01-01 RX ORDER — ROPINIROLE 1 MG/1
1 TABLET, FILM COATED ORAL NIGHTLY
Status: DISCONTINUED | OUTPATIENT
Start: 2017-01-01 | End: 2017-01-01 | Stop reason: HOSPADM

## 2017-01-01 RX ORDER — ATORVASTATIN CALCIUM 80 MG/1
TABLET, FILM COATED ORAL
Qty: 30 TABLET | Refills: 2 | Status: SHIPPED | OUTPATIENT
Start: 2017-01-01

## 2017-01-01 RX ORDER — ALPRAZOLAM 0.25 MG/1
0.25 TABLET ORAL NIGHTLY PRN
Qty: 7 TABLET | Refills: 0 | Status: SHIPPED | OUTPATIENT
Start: 2017-01-01 | End: 2017-01-01

## 2017-01-01 RX ORDER — PANTOPRAZOLE SODIUM 40 MG/1
40 TABLET, DELAYED RELEASE ORAL 2 TIMES DAILY
Status: DISCONTINUED | OUTPATIENT
Start: 2017-01-01 | End: 2017-01-01 | Stop reason: HOSPADM

## 2017-01-01 RX ORDER — ACETAMINOPHEN 325 MG/1
650 TABLET ORAL EVERY 6 HOURS PRN
Refills: 0
Start: 2017-01-01 | End: 2017-01-01

## 2017-01-01 RX ORDER — CLOPIDOGREL BISULFATE 75 MG/1
75 TABLET ORAL DAILY
Status: DISCONTINUED | OUTPATIENT
Start: 2017-01-01 | End: 2017-01-01

## 2017-01-01 RX ORDER — SODIUM CHLORIDE 0.9 % (FLUSH) 0.9 %
10 SYRINGE (ML) INJECTION AS NEEDED
Status: DISCONTINUED | OUTPATIENT
Start: 2017-01-01 | End: 2017-01-01 | Stop reason: HOSPADM

## 2017-01-01 RX ORDER — SODIUM CHLORIDE 0.9 % (FLUSH) 0.9 %
1-10 SYRINGE (ML) INJECTION AS NEEDED
Status: DISCONTINUED | OUTPATIENT
Start: 2017-01-01 | End: 2017-01-01 | Stop reason: HOSPADM

## 2017-01-01 RX ORDER — FUROSEMIDE 10 MG/ML
20 INJECTION INTRAMUSCULAR; INTRAVENOUS ONCE
Status: COMPLETED | OUTPATIENT
Start: 2017-01-01 | End: 2017-01-01

## 2017-01-01 RX ORDER — ECHINACEA PURPUREA EXTRACT 125 MG
2 TABLET ORAL AS NEEDED
Status: DISCONTINUED | OUTPATIENT
Start: 2017-01-01 | End: 2017-01-01 | Stop reason: HOSPADM

## 2017-01-01 RX ORDER — FUROSEMIDE 20 MG/1
20 TABLET ORAL 2 TIMES DAILY
Status: DISCONTINUED | OUTPATIENT
Start: 2017-01-01 | End: 2017-01-01

## 2017-01-01 RX ORDER — POLYETHYLENE GLYCOL 3350 17 G/17G
17 POWDER, FOR SOLUTION ORAL DAILY PRN
Status: DISCONTINUED | OUTPATIENT
Start: 2017-01-01 | End: 2017-01-01 | Stop reason: HOSPADM

## 2017-01-01 RX ORDER — SODIUM POLYSTYRENE SULFONATE 15 G/60ML
15 SUSPENSION ORAL; RECTAL ONCE
Status: COMPLETED | OUTPATIENT
Start: 2017-01-01 | End: 2017-01-01

## 2017-01-01 RX ORDER — QUETIAPINE FUMARATE 25 MG/1
50 TABLET, FILM COATED ORAL NIGHTLY
Qty: 60 TABLET | Refills: 1 | Status: SHIPPED | OUTPATIENT
Start: 2017-01-01 | End: 2017-01-01

## 2017-01-01 RX ORDER — LANOLIN ALCOHOL/MO/W.PET/CERES
5 CREAM (GRAM) TOPICAL NIGHTLY PRN
Status: DISCONTINUED | OUTPATIENT
Start: 2017-01-01 | End: 2017-01-01 | Stop reason: HOSPADM

## 2017-01-01 RX ORDER — ALPRAZOLAM 0.5 MG/1
0.5 TABLET ORAL ONCE
Status: COMPLETED | OUTPATIENT
Start: 2017-01-01 | End: 2017-01-01

## 2017-01-01 RX ORDER — CALCIUM GLUCONATE 94 MG/ML
1 INJECTION, SOLUTION INTRAVENOUS ONCE
Status: COMPLETED | OUTPATIENT
Start: 2017-01-01 | End: 2017-01-01

## 2017-01-01 RX ORDER — DICYCLOMINE HYDROCHLORIDE 10 MG/1
10 CAPSULE ORAL
Qty: 20 CAPSULE | Refills: 0 | Status: SHIPPED | OUTPATIENT
Start: 2017-01-01 | End: 2017-01-01 | Stop reason: HOSPADM

## 2017-01-01 RX ORDER — ROPINIROLE 1 MG/1
1 TABLET, FILM COATED ORAL NIGHTLY
Status: DISCONTINUED | OUTPATIENT
Start: 2017-01-01 | End: 2017-01-01 | Stop reason: SDUPTHER

## 2017-01-01 RX ORDER — ATORVASTATIN CALCIUM 20 MG/1
20 TABLET, FILM COATED ORAL NIGHTLY
Start: 2017-01-01 | End: 2017-01-01 | Stop reason: SDUPTHER

## 2017-01-01 RX ORDER — SODIUM POLYSTYRENE SULFONATE 15 G/60ML
30 SUSPENSION ORAL; RECTAL ONCE
Status: COMPLETED | OUTPATIENT
Start: 2017-01-01 | End: 2017-01-01

## 2017-01-01 RX ORDER — BUDESONIDE 0.5 MG/2ML
0.5 INHALANT ORAL
Status: DISCONTINUED | OUTPATIENT
Start: 2017-01-01 | End: 2017-01-01 | Stop reason: HOSPADM

## 2017-01-01 RX ORDER — WHITE PETROLATUM 450 MG/G
1 STICK TOPICAL 4 TIMES DAILY PRN
Refills: 0
Start: 2017-01-01 | End: 2017-01-01

## 2017-01-01 RX ORDER — BUPROPION HYDROCHLORIDE 150 MG/1
150 TABLET ORAL EVERY MORNING
Status: DISCONTINUED | OUTPATIENT
Start: 2017-01-01 | End: 2017-01-01 | Stop reason: HOSPADM

## 2017-01-01 RX ORDER — BUMETANIDE 1 MG/1
0.5 TABLET ORAL DAILY PRN
Start: 2017-01-01 | End: 2017-01-01

## 2017-01-01 RX ORDER — TRIAMCINOLONE ACETONIDE 1 MG/G
CREAM TOPICAL
COMMUNITY
Start: 2017-01-01 | End: 2017-01-01 | Stop reason: HOSPADM

## 2017-01-01 RX ORDER — ONDANSETRON 2 MG/ML
4 INJECTION INTRAMUSCULAR; INTRAVENOUS EVERY 6 HOURS PRN
Status: DISCONTINUED | OUTPATIENT
Start: 2017-01-01 | End: 2017-01-01 | Stop reason: HOSPADM

## 2017-01-01 RX ORDER — ALPRAZOLAM 0.5 MG/1
0.5 TABLET ORAL 3 TIMES DAILY PRN
Status: DISCONTINUED | OUTPATIENT
Start: 2017-01-01 | End: 2017-01-01 | Stop reason: HOSPADM

## 2017-01-01 RX ORDER — SODIUM CHLORIDE 0.9 % (FLUSH) 0.9 %
10 SYRINGE (ML) INJECTION AS NEEDED
Status: DISCONTINUED | OUTPATIENT
Start: 2017-01-01 | End: 2017-01-01

## 2017-01-01 RX ORDER — BISACODYL 5 MG/1
10 TABLET, DELAYED RELEASE ORAL ONCE
Status: COMPLETED | OUTPATIENT
Start: 2017-01-01 | End: 2017-01-01

## 2017-01-01 RX ORDER — CETIRIZINE HYDROCHLORIDE 10 MG/1
10 TABLET ORAL DAILY
COMMUNITY

## 2017-01-01 RX ORDER — ONDANSETRON 2 MG/ML
4 INJECTION INTRAMUSCULAR; INTRAVENOUS ONCE
Status: COMPLETED | OUTPATIENT
Start: 2017-01-01 | End: 2017-01-01

## 2017-01-01 RX ORDER — MIRTAZAPINE 15 MG/1
45 TABLET, FILM COATED ORAL NIGHTLY
Status: DISCONTINUED | OUTPATIENT
Start: 2017-01-01 | End: 2017-01-01 | Stop reason: HOSPADM

## 2017-01-01 RX ORDER — FUROSEMIDE 10 MG/ML
40 INJECTION INTRAMUSCULAR; INTRAVENOUS DAILY
Status: DISCONTINUED | OUTPATIENT
Start: 2017-01-01 | End: 2017-01-01

## 2017-01-01 RX ORDER — ASPIRIN 325 MG
325 TABLET ORAL ONCE
Status: DISCONTINUED | OUTPATIENT
Start: 2017-01-01 | End: 2017-01-01

## 2017-01-01 RX ORDER — LANOLIN ALCOHOL/MO/W.PET/CERES
3 CREAM (GRAM) TOPICAL NIGHTLY PRN
Status: DISCONTINUED | OUTPATIENT
Start: 2017-01-01 | End: 2017-01-01 | Stop reason: HOSPADM

## 2017-01-01 RX ORDER — DEXTROSE MONOHYDRATE 25 G/50ML
25 INJECTION, SOLUTION INTRAVENOUS
Status: DISCONTINUED | OUTPATIENT
Start: 2017-01-01 | End: 2017-01-01 | Stop reason: HOSPADM

## 2017-01-01 RX ORDER — ATORVASTATIN CALCIUM 20 MG/1
20 TABLET, FILM COATED ORAL NIGHTLY
Status: DISCONTINUED | OUTPATIENT
Start: 2017-01-01 | End: 2017-01-01 | Stop reason: HOSPADM

## 2017-01-01 RX ORDER — POLYETHYLENE GLYCOL 3350 17 G/17G
17 POWDER, FOR SOLUTION ORAL DAILY
Status: DISCONTINUED | OUTPATIENT
Start: 2017-01-01 | End: 2017-01-01 | Stop reason: HOSPADM

## 2017-01-01 RX ORDER — FUROSEMIDE 40 MG/1
40 TABLET ORAL 2 TIMES DAILY
Start: 2017-01-01 | End: 2017-01-01 | Stop reason: SDUPTHER

## 2017-01-01 RX ORDER — SPIRONOLACTONE 25 MG/1
25 TABLET ORAL EVERY OTHER DAY
Qty: 30 TABLET | Refills: 2 | Status: SHIPPED | OUTPATIENT
Start: 2017-01-01 | End: 2017-01-01 | Stop reason: HOSPADM

## 2017-01-01 RX ORDER — BISOPROLOL FUMARATE 5 MG/1
5 TABLET, FILM COATED ORAL
Status: DISCONTINUED | OUTPATIENT
Start: 2017-01-01 | End: 2017-01-01

## 2017-01-01 RX ORDER — ALPRAZOLAM 0.5 MG/1
0.5 TABLET ORAL 3 TIMES DAILY PRN
Qty: 9 TABLET | Refills: 0 | Status: SHIPPED | OUTPATIENT
Start: 2017-01-01

## 2017-01-01 RX ORDER — ESCITALOPRAM OXALATE 10 MG/1
10 TABLET ORAL DAILY
Status: DISCONTINUED | OUTPATIENT
Start: 2017-01-01 | End: 2017-01-01

## 2017-01-01 RX ORDER — BISOPROLOL FUMARATE 5 MG/1
10 TABLET, FILM COATED ORAL
Status: DISCONTINUED | OUTPATIENT
Start: 2017-01-01 | End: 2017-01-01 | Stop reason: HOSPADM

## 2017-01-01 RX ORDER — DOCUSATE SODIUM 100 MG/1
100 CAPSULE, LIQUID FILLED ORAL 2 TIMES DAILY
Status: DISCONTINUED | OUTPATIENT
Start: 2017-01-01 | End: 2017-01-01 | Stop reason: HOSPADM

## 2017-01-01 RX ORDER — BISACODYL 10 MG
10 SUPPOSITORY, RECTAL RECTAL DAILY PRN
Status: DISCONTINUED | OUTPATIENT
Start: 2017-01-01 | End: 2017-01-01 | Stop reason: HOSPADM

## 2017-01-01 RX ORDER — LIDOCAINE 50 MG/G
1 PATCH TOPICAL DAILY PRN
Qty: 90 PATCH | Refills: 1 | Status: SHIPPED | OUTPATIENT
Start: 2017-01-01 | End: 2017-01-01

## 2017-01-01 RX ORDER — LACTULOSE 20 G/30ML
20 SOLUTION ORAL 2 TIMES DAILY
Status: DISCONTINUED | OUTPATIENT
Start: 2017-01-01 | End: 2017-01-01 | Stop reason: HOSPADM

## 2017-01-01 RX ORDER — DIPHENHYDRAMINE HYDROCHLORIDE 50 MG/ML
25 INJECTION INTRAMUSCULAR; INTRAVENOUS ONCE
Status: COMPLETED | OUTPATIENT
Start: 2017-01-01 | End: 2017-01-01

## 2017-01-01 RX ORDER — LEVOTHYROXINE SODIUM 0.12 MG/1
125 TABLET ORAL
Status: DISCONTINUED | OUTPATIENT
Start: 2017-01-01 | End: 2017-01-01 | Stop reason: HOSPADM

## 2017-01-01 RX ORDER — ECHINACEA PURPUREA EXTRACT 125 MG
2 TABLET ORAL AS NEEDED
Refills: 12
Start: 2017-01-01 | End: 2017-01-01

## 2017-01-01 RX ORDER — BISACODYL 10 MG
10 SUPPOSITORY, RECTAL RECTAL ONCE
Status: COMPLETED | OUTPATIENT
Start: 2017-01-01 | End: 2017-01-01

## 2017-01-01 RX ORDER — SODIUM CHLORIDE FOR INHALATION 3 %
4 VIAL, NEBULIZER (ML) INHALATION EVERY 8 HOURS
Status: DISCONTINUED | OUTPATIENT
Start: 2017-01-01 | End: 2017-01-01 | Stop reason: HOSPADM

## 2017-01-01 RX ORDER — METOCLOPRAMIDE 5 MG/1
2.5 TABLET ORAL 2 TIMES DAILY
COMMUNITY
End: 2017-01-01

## 2017-01-01 RX ORDER — FUROSEMIDE 10 MG/ML
40 INJECTION INTRAMUSCULAR; INTRAVENOUS ONCE
Status: COMPLETED | OUTPATIENT
Start: 2017-01-01 | End: 2017-01-01

## 2017-01-01 RX ORDER — PANTOPRAZOLE SODIUM 40 MG/1
40 TABLET, DELAYED RELEASE ORAL 2 TIMES DAILY
Qty: 60 TABLET | Refills: 5 | Status: SHIPPED | OUTPATIENT
Start: 2017-01-01 | End: 2017-01-01 | Stop reason: SDUPTHER

## 2017-01-01 RX ORDER — ALPRAZOLAM 0.5 MG/1
0.5 TABLET ORAL 3 TIMES DAILY PRN
Qty: 30 TABLET | Refills: 0 | Status: SHIPPED | OUTPATIENT
Start: 2017-01-01

## 2017-01-01 RX ORDER — PANTOPRAZOLE SODIUM 40 MG/1
40 TABLET, DELAYED RELEASE ORAL 2 TIMES DAILY
Qty: 60 TABLET | Refills: 5 | Status: SHIPPED | OUTPATIENT
Start: 2017-01-01

## 2017-01-01 RX ORDER — ACETAMINOPHEN 325 MG/1
650 TABLET ORAL EVERY 6 HOURS PRN
Status: DISCONTINUED | OUTPATIENT
Start: 2017-01-01 | End: 2017-01-01 | Stop reason: HOSPADM

## 2017-01-01 RX ORDER — ALPRAZOLAM 0.5 MG/1
0.5 TABLET ORAL EVERY 6 HOURS PRN
Status: DISCONTINUED | OUTPATIENT
Start: 2017-01-01 | End: 2017-01-01 | Stop reason: HOSPADM

## 2017-01-01 RX ORDER — WHITE PETROLATUM 450 MG/G
1 STICK TOPICAL 4 TIMES DAILY PRN
Status: DISCONTINUED | OUTPATIENT
Start: 2017-01-01 | End: 2017-01-01 | Stop reason: HOSPADM

## 2017-01-01 RX ORDER — HYDROXYZINE PAMOATE 25 MG/1
25 CAPSULE ORAL 3 TIMES DAILY PRN
Qty: 90 CAPSULE | Refills: 0 | Status: SHIPPED | OUTPATIENT
Start: 2017-01-01 | End: 2017-01-01

## 2017-01-01 RX ORDER — AMMONIUM LACTATE 12 G/100G
LOTION TOPICAL 2 TIMES DAILY PRN
Qty: 500 G | Refills: 24 | Status: SHIPPED | OUTPATIENT
Start: 2017-01-01 | End: 2017-01-01 | Stop reason: HOSPADM

## 2017-01-01 RX ORDER — IPRATROPIUM BROMIDE AND ALBUTEROL SULFATE 2.5; .5 MG/3ML; MG/3ML
3 SOLUTION RESPIRATORY (INHALATION)
Status: DISCONTINUED | OUTPATIENT
Start: 2017-01-01 | End: 2017-01-01 | Stop reason: HOSPADM

## 2017-01-01 RX ORDER — HYDROXYZINE PAMOATE 25 MG/1
25 CAPSULE ORAL 3 TIMES DAILY PRN
COMMUNITY
End: 2017-01-01 | Stop reason: SDUPTHER

## 2017-01-01 RX ORDER — MAGNESIUM CITRATE
200 SOLUTION, ORAL ORAL ONCE
Status: COMPLETED | OUTPATIENT
Start: 2017-01-01 | End: 2017-01-01

## 2017-01-01 RX ORDER — MORPHINE SULFATE 4 MG/ML
4 INJECTION, SOLUTION INTRAMUSCULAR; INTRAVENOUS ONCE
Status: COMPLETED | OUTPATIENT
Start: 2017-01-01 | End: 2017-01-01

## 2017-01-01 RX ORDER — POLYETHYLENE GLYCOL 3350 17 G/17G
17 POWDER, FOR SOLUTION ORAL DAILY
Qty: 850 G | Refills: 3 | Status: SHIPPED | OUTPATIENT
Start: 2017-01-01

## 2017-01-01 RX ORDER — BISOPROLOL FUMARATE 10 MG/1
10 TABLET, FILM COATED ORAL
Start: 2017-01-01 | End: 2017-01-01

## 2017-01-01 RX ORDER — ATORVASTATIN CALCIUM 20 MG/1
20 TABLET, FILM COATED ORAL NIGHTLY
Start: 2017-01-01 | End: 2017-01-01

## 2017-01-01 RX ORDER — METFORMIN HYDROCHLORIDE 750 MG/1
TABLET, EXTENDED RELEASE ORAL
Qty: 30 TABLET | Refills: 5 | Status: SHIPPED | OUTPATIENT
Start: 2017-01-01 | End: 2017-01-01

## 2017-01-01 RX ORDER — PROMETHAZINE HYDROCHLORIDE 12.5 MG/1
12.5 TABLET ORAL EVERY 6 HOURS PRN
Qty: 20 TABLET | Refills: 0 | Status: ON HOLD | OUTPATIENT
Start: 2017-01-01 | End: 2017-01-01

## 2017-01-01 RX ORDER — QUETIAPINE FUMARATE 25 MG/1
25 TABLET, FILM COATED ORAL NIGHTLY
Qty: 30 TABLET | Refills: 2 | Status: SHIPPED | OUTPATIENT
Start: 2017-01-01 | End: 2017-01-01 | Stop reason: SDUPTHER

## 2017-01-01 RX ORDER — SODIUM CHLORIDE 9 MG/ML
125 INJECTION, SOLUTION INTRAVENOUS CONTINUOUS
Status: DISCONTINUED | OUTPATIENT
Start: 2017-01-01 | End: 2017-01-01 | Stop reason: HOSPADM

## 2017-01-01 RX ORDER — IPRATROPIUM BROMIDE AND ALBUTEROL SULFATE 2.5; .5 MG/3ML; MG/3ML
3 SOLUTION RESPIRATORY (INHALATION) EVERY 6 HOURS PRN
Status: DISCONTINUED | OUTPATIENT
Start: 2017-01-01 | End: 2017-01-01 | Stop reason: HOSPADM

## 2017-01-01 RX ORDER — ACETAMINOPHEN 500 MG
500 TABLET ORAL ONCE
Status: COMPLETED | OUTPATIENT
Start: 2017-01-01 | End: 2017-01-01

## 2017-01-01 RX ORDER — FUROSEMIDE 10 MG/ML
40 INJECTION INTRAMUSCULAR; INTRAVENOUS 2 TIMES DAILY
Status: DISCONTINUED | OUTPATIENT
Start: 2017-01-01 | End: 2017-01-01

## 2017-01-01 RX ORDER — DIAZEPAM 5 MG/ML
5 INJECTION, SOLUTION INTRAMUSCULAR; INTRAVENOUS ONCE
Status: COMPLETED | OUTPATIENT
Start: 2017-01-01 | End: 2017-01-01

## 2017-01-01 RX ORDER — HYDROXYZINE PAMOATE 25 MG/1
25 CAPSULE ORAL 4 TIMES DAILY PRN
Status: DISCONTINUED | OUTPATIENT
Start: 2017-01-01 | End: 2017-01-01 | Stop reason: HOSPADM

## 2017-01-01 RX ORDER — TRAMADOL HYDROCHLORIDE 50 MG/1
50 TABLET ORAL EVERY 6 HOURS PRN
Status: DISCONTINUED | OUTPATIENT
Start: 2017-01-01 | End: 2017-01-01 | Stop reason: HOSPADM

## 2017-01-01 RX ORDER — FUROSEMIDE 40 MG/1
40 TABLET ORAL 2 TIMES DAILY
Status: DISCONTINUED | OUTPATIENT
Start: 2017-01-01 | End: 2017-01-01 | Stop reason: HOSPADM

## 2017-01-01 RX ORDER — ROPINIROLE 1 MG/1
1.5 TABLET, FILM COATED ORAL NIGHTLY
Qty: 45 TABLET | Refills: 2 | Status: SHIPPED | OUTPATIENT
Start: 2017-01-01 | End: 2017-01-01 | Stop reason: HOSPADM

## 2017-01-01 RX ORDER — SPIRONOLACTONE 25 MG/1
25 TABLET ORAL DAILY
Status: DISCONTINUED | OUTPATIENT
Start: 2017-01-01 | End: 2017-01-01

## 2017-01-01 RX ORDER — POLYETHYLENE GLYCOL 3350 17 G/17G
17 POWDER, FOR SOLUTION ORAL DAILY PRN
Qty: 850 G | Refills: 0 | Status: SHIPPED | OUTPATIENT
Start: 2017-01-01 | End: 2017-01-01 | Stop reason: SDUPTHER

## 2017-01-01 RX ORDER — FUROSEMIDE 40 MG/1
40 TABLET ORAL 2 TIMES DAILY PRN
Qty: 60 TABLET | Refills: 2 | Status: ON HOLD | OUTPATIENT
Start: 2017-01-01 | End: 2017-01-01

## 2017-01-01 RX ORDER — DIPHENHYDRAMINE HYDROCHLORIDE 50 MG/ML
50 INJECTION INTRAMUSCULAR; INTRAVENOUS ONCE
Status: COMPLETED | OUTPATIENT
Start: 2017-01-01 | End: 2017-01-01

## 2017-01-01 RX ORDER — ESCITALOPRAM OXALATE 10 MG/1
10 TABLET ORAL DAILY
Qty: 30 TABLET | Refills: 5 | Status: SHIPPED | OUTPATIENT
Start: 2017-01-01

## 2017-01-01 RX ORDER — DICYCLOMINE HYDROCHLORIDE 10 MG/1
10 CAPSULE ORAL
Status: DISCONTINUED | OUTPATIENT
Start: 2017-01-01 | End: 2017-01-01

## 2017-01-01 RX ORDER — ONDANSETRON 4 MG/1
4 TABLET, ORALLY DISINTEGRATING ORAL EVERY 6 HOURS PRN
Qty: 30 TABLET | Refills: 0 | Status: SHIPPED | OUTPATIENT
Start: 2017-01-01

## 2017-01-01 RX ORDER — WHITE PETROLATUM 450 MG/G
1 STICK TOPICAL
Status: DISCONTINUED | OUTPATIENT
Start: 2017-01-01 | End: 2017-01-01

## 2017-01-01 RX ORDER — LIDOCAINE 50 MG/G
1 PATCH TOPICAL EVERY 24 HOURS
Status: DISCONTINUED | OUTPATIENT
Start: 2017-01-01 | End: 2017-01-01 | Stop reason: HOSPADM

## 2017-01-01 RX ORDER — TRAMADOL HYDROCHLORIDE 50 MG/1
50 TABLET ORAL EVERY 6 HOURS PRN
Qty: 30 TABLET | Refills: 0 | Status: SHIPPED | OUTPATIENT
Start: 2017-01-01

## 2017-01-01 RX ORDER — BUMETANIDE 1 MG/1
1 TABLET ORAL DAILY PRN
Status: ON HOLD | COMMUNITY
End: 2017-01-01

## 2017-01-01 RX ORDER — FUROSEMIDE 40 MG/1
40 TABLET ORAL DAILY PRN
Qty: 30 TABLET | Refills: 5 | Status: ON HOLD | OUTPATIENT
Start: 2017-01-01 | End: 2017-01-01

## 2017-01-01 RX ORDER — CLOPIDOGREL BISULFATE 75 MG/1
75 TABLET ORAL DAILY
Status: DISCONTINUED | OUTPATIENT
Start: 2017-01-01 | End: 2017-01-01 | Stop reason: HOSPADM

## 2017-01-01 RX ORDER — NICOTINE POLACRILEX 4 MG
31 LOZENGE BUCCAL
Status: DISCONTINUED | OUTPATIENT
Start: 2017-01-01 | End: 2017-01-01 | Stop reason: HOSPADM

## 2017-01-01 RX ORDER — DEXTROSE MONOHYDRATE 25 G/50ML
50 INJECTION, SOLUTION INTRAVENOUS ONCE
Status: COMPLETED | OUTPATIENT
Start: 2017-01-01 | End: 2017-01-01

## 2017-01-01 RX ORDER — ALPRAZOLAM 0.5 MG/1
0.5 TABLET ORAL 3 TIMES DAILY PRN
Status: DISCONTINUED | OUTPATIENT
Start: 2017-01-01 | End: 2017-01-01

## 2017-01-01 RX ORDER — SPIRONOLACTONE 25 MG/1
25 TABLET ORAL DAILY
Status: DISCONTINUED | OUTPATIENT
Start: 2017-01-01 | End: 2017-01-01 | Stop reason: HOSPADM

## 2017-01-01 RX ORDER — HYDROXYZINE PAMOATE 25 MG/1
25 CAPSULE ORAL 3 TIMES DAILY PRN
Status: DISCONTINUED | OUTPATIENT
Start: 2017-01-01 | End: 2017-01-01 | Stop reason: HOSPADM

## 2017-01-01 RX ORDER — HYDROXYZINE HYDROCHLORIDE 25 MG/1
25 TABLET, FILM COATED ORAL ONCE
Status: COMPLETED | OUTPATIENT
Start: 2017-01-01 | End: 2017-01-01

## 2017-01-01 RX ORDER — PROMETHAZINE HYDROCHLORIDE 25 MG/ML
12.5 INJECTION, SOLUTION INTRAMUSCULAR; INTRAVENOUS ONCE
Status: COMPLETED | OUTPATIENT
Start: 2017-01-01 | End: 2017-01-01

## 2017-01-01 RX ORDER — SODIUM CHLORIDE 9 MG/ML
75 INJECTION, SOLUTION INTRAVENOUS CONTINUOUS
Status: DISCONTINUED | OUTPATIENT
Start: 2017-01-01 | End: 2017-01-01

## 2017-01-01 RX ORDER — NITROFURANTOIN 25; 75 MG/1; MG/1
100 CAPSULE ORAL 2 TIMES DAILY
Qty: 14 CAPSULE | Refills: 0 | Status: SHIPPED | OUTPATIENT
Start: 2017-01-01 | End: 2017-01-01

## 2017-01-01 RX ORDER — LOSARTAN POTASSIUM 50 MG/1
50 TABLET ORAL 2 TIMES DAILY
COMMUNITY
End: 2017-01-01 | Stop reason: HOSPADM

## 2017-01-01 RX ORDER — AMMONIUM LACTATE 12 G/100G
LOTION TOPICAL 2 TIMES DAILY PRN
Status: DISCONTINUED | OUTPATIENT
Start: 2017-01-01 | End: 2017-01-01 | Stop reason: HOSPADM

## 2017-01-01 RX ORDER — PSEUDOEPHEDRINE HCL 30 MG
100 TABLET ORAL 2 TIMES DAILY
Refills: 0
Start: 2017-01-01

## 2017-01-01 RX ORDER — FUROSEMIDE 40 MG/1
20 TABLET ORAL DAILY
Qty: 30 TABLET | Refills: 0
Start: 2017-01-01

## 2017-01-01 RX ORDER — WHITE PETROLATUM 450 MG/G
1 STICK TOPICAL AS NEEDED
Status: DISCONTINUED | OUTPATIENT
Start: 2017-01-01 | End: 2017-01-01 | Stop reason: SDUPTHER

## 2017-01-01 RX ORDER — IPRATROPIUM BROMIDE AND ALBUTEROL SULFATE 2.5; .5 MG/3ML; MG/3ML
3 SOLUTION RESPIRATORY (INHALATION) EVERY 4 HOURS PRN
Status: DISCONTINUED | OUTPATIENT
Start: 2017-01-01 | End: 2017-01-01 | Stop reason: HOSPADM

## 2017-01-01 RX ORDER — CLONAZEPAM 0.5 MG/1
0.5 TABLET ORAL NIGHTLY PRN
Status: DISCONTINUED | OUTPATIENT
Start: 2017-01-01 | End: 2017-01-01 | Stop reason: HOSPADM

## 2017-01-01 RX ORDER — SPIRONOLACTONE 25 MG/1
25 TABLET ORAL EVERY OTHER DAY
Status: DISCONTINUED | OUTPATIENT
Start: 2017-01-01 | End: 2017-01-01 | Stop reason: HOSPADM

## 2017-01-01 RX ORDER — ESCITALOPRAM OXALATE 10 MG/1
10 TABLET ORAL DAILY
Status: DISCONTINUED | OUTPATIENT
Start: 2017-01-01 | End: 2017-01-01 | Stop reason: HOSPADM

## 2017-01-01 RX ORDER — SPIRONOLACTONE 25 MG/1
25 TABLET ORAL EVERY OTHER DAY
Start: 2017-01-01

## 2017-01-01 RX ORDER — POLYETHYLENE GLYCOL 3350 17 G/17G
17 POWDER, FOR SOLUTION ORAL DAILY PRN
Qty: 850 G | Refills: 3 | Status: ON HOLD | OUTPATIENT
Start: 2017-01-01 | End: 2017-01-01

## 2017-01-01 RX ORDER — LOSARTAN POTASSIUM 50 MG/1
50 TABLET ORAL 2 TIMES DAILY
Status: DISCONTINUED | OUTPATIENT
Start: 2017-01-01 | End: 2017-01-01 | Stop reason: HOSPADM

## 2017-01-01 RX ORDER — WHITE PETROLATUM 450 MG/G
1 STICK TOPICAL AS NEEDED
Status: DISCONTINUED | OUTPATIENT
Start: 2017-01-01 | End: 2017-01-01 | Stop reason: HOSPADM

## 2017-01-01 RX ORDER — METOCLOPRAMIDE 5 MG/1
2.5 TABLET ORAL 2 TIMES DAILY PRN
Qty: 30 TABLET | Refills: 5 | Status: SHIPPED | OUTPATIENT
Start: 2017-01-01 | End: 2017-01-01

## 2017-01-01 RX ORDER — CETIRIZINE HYDROCHLORIDE 10 MG/1
5 TABLET ORAL DAILY
Status: DISCONTINUED | OUTPATIENT
Start: 2017-01-01 | End: 2017-01-01 | Stop reason: HOSPADM

## 2017-01-01 RX ORDER — ACETAMINOPHEN 325 MG/1
650 TABLET ORAL EVERY 6 HOURS PRN
Refills: 0
Start: 2017-01-01

## 2017-01-01 RX ORDER — BUDESONIDE AND FORMOTEROL FUMARATE DIHYDRATE 160; 4.5 UG/1; UG/1
2 AEROSOL RESPIRATORY (INHALATION)
Status: DISCONTINUED | OUTPATIENT
Start: 2017-01-01 | End: 2017-01-01 | Stop reason: HOSPADM

## 2017-01-01 RX ORDER — HYDROXYZINE PAMOATE 25 MG/1
25 CAPSULE ORAL ONCE
Status: COMPLETED | OUTPATIENT
Start: 2017-01-01 | End: 2017-01-01

## 2017-01-01 RX ORDER — ALPRAZOLAM 0.25 MG/1
0.25 TABLET ORAL NIGHTLY PRN
Status: DISCONTINUED | OUTPATIENT
Start: 2017-01-01 | End: 2017-01-01 | Stop reason: HOSPADM

## 2017-01-01 RX ORDER — BACITRACIN, NEOMYCIN, POLYMYXIN B 400; 3.5; 5 [USP'U]/G; MG/G; [USP'U]/G
OINTMENT TOPICAL EVERY 8 HOURS SCHEDULED
Status: DISCONTINUED | OUTPATIENT
Start: 2017-01-01 | End: 2017-01-01 | Stop reason: HOSPADM

## 2017-01-01 RX ORDER — ROPINIROLE 1 MG/1
1.5 TABLET, FILM COATED ORAL NIGHTLY
Qty: 45 TABLET | Refills: 2 | Status: SHIPPED | OUTPATIENT
Start: 2017-01-01 | End: 2017-01-01 | Stop reason: SDUPTHER

## 2017-01-01 RX ORDER — FUROSEMIDE 40 MG/1
20 TABLET ORAL EVERY OTHER DAY
Qty: 30 TABLET | Refills: 5 | Status: SHIPPED | OUTPATIENT
Start: 2017-01-01 | End: 2017-01-01 | Stop reason: HOSPADM

## 2017-01-01 RX ORDER — ATORVASTATIN CALCIUM 40 MG/1
40 TABLET, FILM COATED ORAL NIGHTLY
Status: DISCONTINUED | OUTPATIENT
Start: 2017-01-01 | End: 2017-01-01 | Stop reason: HOSPADM

## 2017-01-01 RX ORDER — FUROSEMIDE 40 MG/1
40 TABLET ORAL DAILY
Status: DISCONTINUED | OUTPATIENT
Start: 2017-01-01 | End: 2017-01-01

## 2017-01-01 RX ORDER — LOSARTAN POTASSIUM 50 MG/1
50 TABLET ORAL 2 TIMES DAILY
COMMUNITY

## 2017-01-01 RX ORDER — SPIRONOLACTONE 25 MG/1
TABLET ORAL
Qty: 30 TABLET | Refills: 2 | Status: ON HOLD | OUTPATIENT
Start: 2017-01-01 | End: 2017-01-01

## 2017-01-01 RX ADMIN — INSULIN ASPART 2 UNITS: 100 INJECTION, SOLUTION INTRAVENOUS; SUBCUTANEOUS at 20:06

## 2017-01-01 RX ADMIN — Medication 1 EACH: at 14:56

## 2017-01-01 RX ADMIN — LEVOTHYROXINE SODIUM 125 MCG: 125 TABLET ORAL at 06:29

## 2017-01-01 RX ADMIN — LIDOCAINE 1 PATCH: 50 PATCH CUTANEOUS at 20:34

## 2017-01-01 RX ADMIN — MIRTAZAPINE 45 MG: 15 TABLET, FILM COATED ORAL at 21:38

## 2017-01-01 RX ADMIN — BUDESONIDE AND FORMOTEROL FUMARATE DIHYDRATE 2 PUFF: 160; 4.5 AEROSOL RESPIRATORY (INHALATION) at 10:05

## 2017-01-01 RX ADMIN — IPRATROPIUM BROMIDE AND ALBUTEROL SULFATE 3 ML: .5; 3 SOLUTION RESPIRATORY (INHALATION) at 08:17

## 2017-01-01 RX ADMIN — IPRATROPIUM BROMIDE AND ALBUTEROL SULFATE 3 ML: .5; 3 SOLUTION RESPIRATORY (INHALATION) at 15:41

## 2017-01-01 RX ADMIN — HYDROXYZINE PAMOATE 25 MG: 25 CAPSULE ORAL at 05:10

## 2017-01-01 RX ADMIN — HYDROXYZINE PAMOATE 25 MG: 25 CAPSULE ORAL at 23:53

## 2017-01-01 RX ADMIN — SODIUM CHLORIDE 125 ML/HR: 9 INJECTION, SOLUTION INTRAVENOUS at 11:51

## 2017-01-01 RX ADMIN — PHENYLEPHRINE HYDROCHLORIDE 2 SPRAY: 0.5 SPRAY NASAL at 17:25

## 2017-01-01 RX ADMIN — CLOPIDOGREL BISULFATE 75 MG: 75 TABLET ORAL at 08:02

## 2017-01-01 RX ADMIN — HYDROXYZINE PAMOATE 25 MG: 25 CAPSULE ORAL at 12:58

## 2017-01-01 RX ADMIN — FUROSEMIDE 40 MG: 40 TABLET ORAL at 17:47

## 2017-01-01 RX ADMIN — LACTULOSE 20 G: 10 SOLUTION ORAL at 10:25

## 2017-01-01 RX ADMIN — METOPROLOL TARTRATE 25 MG: 25 TABLET ORAL at 09:21

## 2017-01-01 RX ADMIN — ESCITALOPRAM OXALATE 10 MG: 10 TABLET ORAL at 08:32

## 2017-01-01 RX ADMIN — IPRATROPIUM BROMIDE AND ALBUTEROL SULFATE 3 ML: .5; 3 SOLUTION RESPIRATORY (INHALATION) at 13:46

## 2017-01-01 RX ADMIN — SALINE NASAL SPRAY 2 SPRAY: 1.5 SOLUTION NASAL at 09:00

## 2017-01-01 RX ADMIN — FUROSEMIDE 40 MG: 10 INJECTION, SOLUTION INTRAMUSCULAR; INTRAVENOUS at 08:59

## 2017-01-01 RX ADMIN — ALPRAZOLAM 0.5 MG: 0.5 TABLET ORAL at 00:05

## 2017-01-01 RX ADMIN — BISACODYL 10 MG: 10 SUPPOSITORY RECTAL at 19:51

## 2017-01-01 RX ADMIN — ALPRAZOLAM 0.5 MG: 0.5 TABLET ORAL at 16:41

## 2017-01-01 RX ADMIN — DICYCLOMINE HYDROCHLORIDE 10 MG: 10 CAPSULE ORAL at 20:37

## 2017-01-01 RX ADMIN — INSULIN ASPART 4 UNITS: 100 INJECTION, SOLUTION INTRAVENOUS; SUBCUTANEOUS at 12:39

## 2017-01-01 RX ADMIN — HYDROCORTISONE: 25 CREAM TOPICAL at 21:38

## 2017-01-01 RX ADMIN — BUPROPION HYDROCHLORIDE 150 MG: 150 TABLET, FILM COATED, EXTENDED RELEASE ORAL at 06:05

## 2017-01-01 RX ADMIN — METOPROLOL TARTRATE 25 MG: 25 TABLET ORAL at 08:23

## 2017-01-01 RX ADMIN — IPRATROPIUM BROMIDE AND ALBUTEROL SULFATE 3 ML: .5; 3 SOLUTION RESPIRATORY (INHALATION) at 16:19

## 2017-01-01 RX ADMIN — PANTOPRAZOLE SODIUM 40 MG: 40 TABLET, DELAYED RELEASE ORAL at 17:54

## 2017-01-01 RX ADMIN — CLOPIDOGREL BISULFATE 75 MG: 75 TABLET ORAL at 08:33

## 2017-01-01 RX ADMIN — SACUBITRIL AND VALSARTAN 1 TABLET: 24; 26 TABLET, FILM COATED ORAL at 21:00

## 2017-01-01 RX ADMIN — PHENYLEPHRINE HYDROCHLORIDE 2 SPRAY: 0.5 SPRAY NASAL at 16:52

## 2017-01-01 RX ADMIN — HYDROXYZINE PAMOATE 25 MG: 25 CAPSULE ORAL at 00:05

## 2017-01-01 RX ADMIN — PANTOPRAZOLE SODIUM 40 MG: 40 TABLET, DELAYED RELEASE ORAL at 08:03

## 2017-01-01 RX ADMIN — HYDROXYZINE PAMOATE 25 MG: 25 CAPSULE ORAL at 12:03

## 2017-01-01 RX ADMIN — HYDROXYZINE PAMOATE 25 MG: 25 CAPSULE ORAL at 09:46

## 2017-01-01 RX ADMIN — PHENYLEPHRINE HYDROCHLORIDE 2 SPRAY: 0.5 SPRAY NASAL at 21:18

## 2017-01-01 RX ADMIN — ALPRAZOLAM 0.5 MG: 0.5 TABLET ORAL at 09:57

## 2017-01-01 RX ADMIN — PANTOPRAZOLE SODIUM 40 MG: 40 TABLET, DELAYED RELEASE ORAL at 18:03

## 2017-01-01 RX ADMIN — IPRATROPIUM BROMIDE AND ALBUTEROL SULFATE 3 ML: .5; 3 SOLUTION RESPIRATORY (INHALATION) at 07:37

## 2017-01-01 RX ADMIN — METOPROLOL TARTRATE 25 MG: 25 TABLET ORAL at 21:56

## 2017-01-01 RX ADMIN — DICYCLOMINE HYDROCHLORIDE 10 MG: 10 CAPSULE ORAL at 06:31

## 2017-01-01 RX ADMIN — DOCUSATE SODIUM 100 MG: 100 CAPSULE, LIQUID FILLED ORAL at 08:33

## 2017-01-01 RX ADMIN — ALPRAZOLAM 0.5 MG: 0.5 TABLET ORAL at 04:43

## 2017-01-01 RX ADMIN — DOCUSATE SODIUM 100 MG: 100 CAPSULE, LIQUID FILLED ORAL at 17:54

## 2017-01-01 RX ADMIN — HYDROCORTISONE: 25 CREAM TOPICAL at 12:18

## 2017-01-01 RX ADMIN — HYDROXYZINE PAMOATE 25 MG: 25 CAPSULE ORAL at 08:24

## 2017-01-01 RX ADMIN — SODIUM CHLORIDE 30 ML/HR: 9 INJECTION, SOLUTION INTRAVENOUS at 18:27

## 2017-01-01 RX ADMIN — IPRATROPIUM BROMIDE AND ALBUTEROL SULFATE 3 ML: .5; 3 SOLUTION RESPIRATORY (INHALATION) at 23:16

## 2017-01-01 RX ADMIN — SODIUM CHLORIDE SOLN NEBU 3% 4 ML: 3 NEBU SOLN at 23:50

## 2017-01-01 RX ADMIN — IPRATROPIUM BROMIDE AND ALBUTEROL SULFATE 3 ML: .5; 3 SOLUTION RESPIRATORY (INHALATION) at 01:08

## 2017-01-01 RX ADMIN — SODIUM CHLORIDE 1000 ML: 9 INJECTION, SOLUTION INTRAVENOUS at 20:40

## 2017-01-01 RX ADMIN — IPRATROPIUM BROMIDE AND ALBUTEROL SULFATE 3 ML: .5; 3 SOLUTION RESPIRATORY (INHALATION) at 07:38

## 2017-01-01 RX ADMIN — ENOXAPARIN SODIUM 30 MG: 30 INJECTION SUBCUTANEOUS at 08:22

## 2017-01-01 RX ADMIN — DOCUSATE SODIUM 100 MG: 100 CAPSULE, LIQUID FILLED ORAL at 12:53

## 2017-01-01 RX ADMIN — BUDESONIDE 0.5 MG: 0.5 INHALANT RESPIRATORY (INHALATION) at 21:40

## 2017-01-01 RX ADMIN — ATORVASTATIN CALCIUM 20 MG: 20 TABLET, FILM COATED ORAL at 20:19

## 2017-01-01 RX ADMIN — BUPROPION HYDROCHLORIDE 150 MG: 150 TABLET, FILM COATED, EXTENDED RELEASE ORAL at 06:30

## 2017-01-01 RX ADMIN — POLYMYXIN B SULFATE, BACITRACIN ZINC, NEOMYCIN SULFATE: 5000; 3.5; 4 OINTMENT TOPICAL at 21:41

## 2017-01-01 RX ADMIN — BUPROPION HYDROCHLORIDE 150 MG: 150 TABLET, FILM COATED, EXTENDED RELEASE ORAL at 06:26

## 2017-01-01 RX ADMIN — FUROSEMIDE 20 MG: 20 TABLET ORAL at 17:17

## 2017-01-01 RX ADMIN — LOSARTAN POTASSIUM 50 MG: 50 TABLET, FILM COATED ORAL at 08:54

## 2017-01-01 RX ADMIN — ATORVASTATIN CALCIUM 20 MG: 20 TABLET, FILM COATED ORAL at 20:46

## 2017-01-01 RX ADMIN — ESCITALOPRAM OXALATE 10 MG: 10 TABLET ORAL at 08:02

## 2017-01-01 RX ADMIN — DICYCLOMINE HYDROCHLORIDE 10 MG: 10 CAPSULE ORAL at 11:43

## 2017-01-01 RX ADMIN — IPRATROPIUM BROMIDE AND ALBUTEROL SULFATE 3 ML: .5; 3 SOLUTION RESPIRATORY (INHALATION) at 16:13

## 2017-01-01 RX ADMIN — SODIUM CHLORIDE 125 ML/HR: 9 INJECTION, SOLUTION INTRAVENOUS at 14:22

## 2017-01-01 RX ADMIN — ACETAMINOPHEN 650 MG: 325 TABLET, FILM COATED ORAL at 21:29

## 2017-01-01 RX ADMIN — LIDOCAINE 1 PATCH: 50 PATCH CUTANEOUS at 20:18

## 2017-01-01 RX ADMIN — PANTOPRAZOLE SODIUM 40 MG: 40 TABLET, DELAYED RELEASE ORAL at 10:01

## 2017-01-01 RX ADMIN — Medication 10 ML: at 09:07

## 2017-01-01 RX ADMIN — ENOXAPARIN SODIUM 30 MG: 30 INJECTION SUBCUTANEOUS at 10:26

## 2017-01-01 RX ADMIN — DICYCLOMINE HYDROCHLORIDE 10 MG: 10 CAPSULE ORAL at 06:44

## 2017-01-01 RX ADMIN — ALPRAZOLAM 0.5 MG: 0.5 TABLET ORAL at 08:02

## 2017-01-01 RX ADMIN — SODIUM CHLORIDE SOLN NEBU 3% 4 ML: 3 NEBU SOLN at 22:50

## 2017-01-01 RX ADMIN — IPRATROPIUM BROMIDE AND ALBUTEROL SULFATE 3 ML: .5; 3 SOLUTION RESPIRATORY (INHALATION) at 07:10

## 2017-01-01 RX ADMIN — MELATONIN TAB 3 MG 4.5 MG: 3 TAB at 21:04

## 2017-01-01 RX ADMIN — BUPROPION HYDROCHLORIDE 150 MG: 150 TABLET, FILM COATED, EXTENDED RELEASE ORAL at 06:21

## 2017-01-01 RX ADMIN — IPRATROPIUM BROMIDE AND ALBUTEROL SULFATE 3 ML: .5; 3 SOLUTION RESPIRATORY (INHALATION) at 16:42

## 2017-01-01 RX ADMIN — HYDROXYZINE PAMOATE 25 MG: 25 CAPSULE ORAL at 00:13

## 2017-01-01 RX ADMIN — HYDROXYZINE HYDROCHLORIDE 25 MG: 25 TABLET, FILM COATED ORAL at 11:13

## 2017-01-01 RX ADMIN — PANTOPRAZOLE SODIUM 40 MG: 40 TABLET, DELAYED RELEASE ORAL at 18:00

## 2017-01-01 RX ADMIN — SODIUM CHLORIDE 125 ML/HR: 9 INJECTION, SOLUTION INTRAVENOUS at 05:56

## 2017-01-01 RX ADMIN — CETIRIZINE HYDROCHLORIDE 5 MG: 10 TABLET, FILM COATED ORAL at 08:32

## 2017-01-01 RX ADMIN — ALPRAZOLAM 0.5 MG: 0.5 TABLET ORAL at 21:15

## 2017-01-01 RX ADMIN — PHENYLEPHRINE HYDROCHLORIDE 2 SPRAY: 0.5 SPRAY NASAL at 10:04

## 2017-01-01 RX ADMIN — MIRTAZAPINE 45 MG: 15 TABLET, FILM COATED ORAL at 20:18

## 2017-01-01 RX ADMIN — PANTOPRAZOLE SODIUM 40 MG: 40 TABLET, DELAYED RELEASE ORAL at 09:21

## 2017-01-01 RX ADMIN — PHENYLEPHRINE HYDROCHLORIDE 2 SPRAY: 0.5 SPRAY NASAL at 09:37

## 2017-01-01 RX ADMIN — BUDESONIDE 0.5 MG: 0.5 INHALANT RESPIRATORY (INHALATION) at 22:50

## 2017-01-01 RX ADMIN — IPRATROPIUM BROMIDE AND ALBUTEROL SULFATE 3 ML: .5; 3 SOLUTION RESPIRATORY (INHALATION) at 21:41

## 2017-01-01 RX ADMIN — IPRATROPIUM BROMIDE AND ALBUTEROL SULFATE 3 ML: .5; 3 SOLUTION RESPIRATORY (INHALATION) at 19:03

## 2017-01-01 RX ADMIN — BUDESONIDE AND FORMOTEROL FUMARATE DIHYDRATE 2 PUFF: 160; 4.5 AEROSOL RESPIRATORY (INHALATION) at 20:14

## 2017-01-01 RX ADMIN — ALPRAZOLAM 0.5 MG: 0.5 TABLET ORAL at 09:46

## 2017-01-01 RX ADMIN — INSULIN ASPART 2 UNITS: 100 INJECTION, SOLUTION INTRAVENOUS; SUBCUTANEOUS at 17:12

## 2017-01-01 RX ADMIN — ALPRAZOLAM 0.5 MG: 0.5 TABLET ORAL at 12:58

## 2017-01-01 RX ADMIN — AMMONIUM LACTATE: 120 LOTION TOPICAL at 10:02

## 2017-01-01 RX ADMIN — BISOPROLOL FUMARATE 5 MG: 5 TABLET ORAL at 17:30

## 2017-01-01 RX ADMIN — ROPINIROLE HYDROCHLORIDE 1 MG: 1 TABLET, FILM COATED ORAL at 20:46

## 2017-01-01 RX ADMIN — ALPRAZOLAM 0.25 MG: 0.25 TABLET ORAL at 23:16

## 2017-01-01 RX ADMIN — MIRTAZAPINE 45 MG: 15 TABLET, FILM COATED ORAL at 21:45

## 2017-01-01 RX ADMIN — METOPROLOL TARTRATE 25 MG: 25 TABLET ORAL at 08:02

## 2017-01-01 RX ADMIN — ENOXAPARIN SODIUM 40 MG: 40 INJECTION SUBCUTANEOUS at 09:02

## 2017-01-01 RX ADMIN — DOCUSATE SODIUM 100 MG: 100 CAPSULE, LIQUID FILLED ORAL at 10:24

## 2017-01-01 RX ADMIN — INSULIN ASPART 3 UNITS: 100 INJECTION, SOLUTION INTRAVENOUS; SUBCUTANEOUS at 16:49

## 2017-01-01 RX ADMIN — DOCUSATE SODIUM 100 MG: 100 CAPSULE, LIQUID FILLED ORAL at 08:23

## 2017-01-01 RX ADMIN — ALPRAZOLAM 0.5 MG: 0.5 TABLET ORAL at 19:59

## 2017-01-01 RX ADMIN — LACTULOSE 20 G: 10 SOLUTION ORAL at 08:01

## 2017-01-01 RX ADMIN — LEVOTHYROXINE SODIUM 125 MCG: 125 TABLET ORAL at 06:31

## 2017-01-01 RX ADMIN — IPRATROPIUM BROMIDE AND ALBUTEROL SULFATE 3 ML: .5; 3 SOLUTION RESPIRATORY (INHALATION) at 23:50

## 2017-01-01 RX ADMIN — SACUBITRIL AND VALSARTAN 1 TABLET: 24; 26 TABLET, FILM COATED ORAL at 09:35

## 2017-01-01 RX ADMIN — MELATONIN TAB 3 MG 4.5 MG: 3 TAB at 00:56

## 2017-01-01 RX ADMIN — DEXTROSE MONOHYDRATE 50 ML: 25 INJECTION, SOLUTION INTRAVENOUS at 04:16

## 2017-01-01 RX ADMIN — CARBIDOPA AND LEVODOPA 1 TABLET: 25; 100 TABLET ORAL at 10:10

## 2017-01-01 RX ADMIN — PANTOPRAZOLE SODIUM 40 MG: 40 TABLET, DELAYED RELEASE ORAL at 08:58

## 2017-01-01 RX ADMIN — SPIRONOLACTONE 25 MG: 25 TABLET, FILM COATED ORAL at 10:09

## 2017-01-01 RX ADMIN — BUPROPION HYDROCHLORIDE 150 MG: 150 TABLET, FILM COATED, EXTENDED RELEASE ORAL at 06:19

## 2017-01-01 RX ADMIN — PANTOPRAZOLE SODIUM 40 MG: 40 TABLET, DELAYED RELEASE ORAL at 17:47

## 2017-01-01 RX ADMIN — CLOPIDOGREL BISULFATE 75 MG: 75 TABLET ORAL at 10:10

## 2017-01-01 RX ADMIN — HYDROXYZINE PAMOATE 25 MG: 25 CAPSULE ORAL at 08:59

## 2017-01-01 RX ADMIN — HYDROXYZINE PAMOATE 25 MG: 25 CAPSULE ORAL at 23:37

## 2017-01-01 RX ADMIN — PHENYLEPHRINE HYDROCHLORIDE 2 SPRAY: 0.5 SPRAY NASAL at 03:36

## 2017-01-01 RX ADMIN — MELATONIN TAB 3 MG 3 MG: 3 TAB at 20:46

## 2017-01-01 RX ADMIN — SACUBITRIL AND VALSARTAN 1 TABLET: 24; 26 TABLET, FILM COATED ORAL at 10:02

## 2017-01-01 RX ADMIN — POLYMYXIN B SULFATE, BACITRACIN ZINC, NEOMYCIN SULFATE: 5000; 3.5; 4 OINTMENT TOPICAL at 06:33

## 2017-01-01 RX ADMIN — ROPINIROLE HYDROCHLORIDE 1 MG: 1 TABLET, FILM COATED ORAL at 22:24

## 2017-01-01 RX ADMIN — PANTOPRAZOLE SODIUM 40 MG: 40 TABLET, DELAYED RELEASE ORAL at 17:18

## 2017-01-01 RX ADMIN — INSULIN DETEMIR 40 UNITS: 100 INJECTION, SOLUTION SUBCUTANEOUS at 20:50

## 2017-01-01 RX ADMIN — ROPINIROLE HYDROCHLORIDE 1 MG: 1 TABLET, FILM COATED ORAL at 21:15

## 2017-01-01 RX ADMIN — MIRTAZAPINE 45 MG: 15 TABLET, FILM COATED ORAL at 21:14

## 2017-01-01 RX ADMIN — POLYMYXIN B SULFATE, BACITRACIN ZINC, NEOMYCIN SULFATE: 5000; 3.5; 4 OINTMENT TOPICAL at 21:04

## 2017-01-01 RX ADMIN — LEVOTHYROXINE SODIUM 125 MCG: 125 TABLET ORAL at 06:12

## 2017-01-01 RX ADMIN — METOPROLOL TARTRATE 25 MG: 25 TABLET ORAL at 17:06

## 2017-01-01 RX ADMIN — MELATONIN TAB 3 MG 3 MG: 3 TAB at 00:14

## 2017-01-01 RX ADMIN — IPRATROPIUM BROMIDE AND ALBUTEROL SULFATE 3 ML: .5; 3 SOLUTION RESPIRATORY (INHALATION) at 00:55

## 2017-01-01 RX ADMIN — HYDROCORTISONE: 25 CREAM TOPICAL at 21:48

## 2017-01-01 RX ADMIN — MIRTAZAPINE 45 MG: 15 TABLET, FILM COATED ORAL at 20:56

## 2017-01-01 RX ADMIN — DOCUSATE SODIUM 100 MG: 100 CAPSULE, LIQUID FILLED ORAL at 17:07

## 2017-01-01 RX ADMIN — PHENYLEPHRINE HYDROCHLORIDE 2 SPRAY: 0.5 SPRAY NASAL at 10:21

## 2017-01-01 RX ADMIN — ALPRAZOLAM 0.5 MG: 0.5 TABLET ORAL at 08:23

## 2017-01-01 RX ADMIN — ALPRAZOLAM 0.5 MG: 0.5 TABLET ORAL at 17:30

## 2017-01-01 RX ADMIN — ALPRAZOLAM 0.5 MG: 0.5 TABLET ORAL at 00:13

## 2017-01-01 RX ADMIN — ALPRAZOLAM 0.5 MG: 0.5 TABLET ORAL at 02:47

## 2017-01-01 RX ADMIN — HYDROXYZINE PAMOATE 25 MG: 25 CAPSULE ORAL at 20:35

## 2017-01-01 RX ADMIN — FUROSEMIDE 20 MG: 20 TABLET ORAL at 08:32

## 2017-01-01 RX ADMIN — ALPRAZOLAM 0.5 MG: 0.5 TABLET ORAL at 21:57

## 2017-01-01 RX ADMIN — SACUBITRIL AND VALSARTAN 1 TABLET: 24; 26 TABLET, FILM COATED ORAL at 20:56

## 2017-01-01 RX ADMIN — PANTOPRAZOLE SODIUM 40 MG: 40 TABLET, DELAYED RELEASE ORAL at 08:35

## 2017-01-01 RX ADMIN — INSULIN ASPART 3 UNITS: 100 INJECTION, SOLUTION INTRAVENOUS; SUBCUTANEOUS at 21:39

## 2017-01-01 RX ADMIN — ALPRAZOLAM 0.5 MG: 0.5 TABLET ORAL at 17:12

## 2017-01-01 RX ADMIN — ALPRAZOLAM 0.5 MG: 0.5 TABLET ORAL at 00:07

## 2017-01-01 RX ADMIN — IPRATROPIUM BROMIDE AND ALBUTEROL SULFATE 3 ML: .5; 3 SOLUTION RESPIRATORY (INHALATION) at 13:00

## 2017-01-01 RX ADMIN — INSULIN DETEMIR 40 UNITS: 100 INJECTION, SOLUTION SUBCUTANEOUS at 20:36

## 2017-01-01 RX ADMIN — ALPRAZOLAM 0.5 MG: 0.5 TABLET ORAL at 23:53

## 2017-01-01 RX ADMIN — INSULIN DETEMIR 20 UNITS: 100 INJECTION, SOLUTION SUBCUTANEOUS at 20:06

## 2017-01-01 RX ADMIN — HYDROXYZINE PAMOATE 25 MG: 25 CAPSULE ORAL at 16:41

## 2017-01-01 RX ADMIN — ATORVASTATIN CALCIUM 20 MG: 20 TABLET, FILM COATED ORAL at 20:35

## 2017-01-01 RX ADMIN — BUDESONIDE 0.5 MG: 0.5 INHALANT RESPIRATORY (INHALATION) at 20:20

## 2017-01-01 RX ADMIN — MIRTAZAPINE 45 MG: 15 TABLET, FILM COATED ORAL at 20:06

## 2017-01-01 RX ADMIN — HYDROXYZINE PAMOATE 25 MG: 25 CAPSULE ORAL at 05:55

## 2017-01-01 RX ADMIN — ESCITALOPRAM OXALATE 10 MG: 10 TABLET ORAL at 09:02

## 2017-01-01 RX ADMIN — PANTOPRAZOLE SODIUM 40 MG: 40 TABLET, DELAYED RELEASE ORAL at 08:32

## 2017-01-01 RX ADMIN — INSULIN ASPART 3 UNITS: 100 INJECTION, SOLUTION INTRAVENOUS; SUBCUTANEOUS at 17:48

## 2017-01-01 RX ADMIN — PANTOPRAZOLE SODIUM 40 MG: 40 TABLET, DELAYED RELEASE ORAL at 17:12

## 2017-01-01 RX ADMIN — ENOXAPARIN SODIUM 30 MG: 30 INJECTION SUBCUTANEOUS at 08:01

## 2017-01-01 RX ADMIN — BUPROPION HYDROCHLORIDE 150 MG: 150 TABLET, FILM COATED, EXTENDED RELEASE ORAL at 05:55

## 2017-01-01 RX ADMIN — IPRATROPIUM BROMIDE AND ALBUTEROL SULFATE 3 ML: .5; 3 SOLUTION RESPIRATORY (INHALATION) at 10:04

## 2017-01-01 RX ADMIN — PANTOPRAZOLE SODIUM 40 MG: 40 TABLET, DELAYED RELEASE ORAL at 17:07

## 2017-01-01 RX ADMIN — HYDROXYZINE PAMOATE 25 MG: 25 CAPSULE ORAL at 16:13

## 2017-01-01 RX ADMIN — SPIRONOLACTONE 25 MG: 25 TABLET, FILM COATED ORAL at 21:57

## 2017-01-01 RX ADMIN — FUROSEMIDE 40 MG: 10 INJECTION, SOLUTION INTRAMUSCULAR; INTRAVENOUS at 21:57

## 2017-01-01 RX ADMIN — SACUBITRIL AND VALSARTAN 1 TABLET: 24; 26 TABLET, FILM COATED ORAL at 10:25

## 2017-01-01 RX ADMIN — MAGNESIUM CITRATE 200 ML: 1.75 LIQUID ORAL at 14:06

## 2017-01-01 RX ADMIN — PANTOPRAZOLE SODIUM 40 MG: 40 TABLET, DELAYED RELEASE ORAL at 10:25

## 2017-01-01 RX ADMIN — MIRTAZAPINE 45 MG: 15 TABLET, FILM COATED ORAL at 20:35

## 2017-01-01 RX ADMIN — CLOPIDOGREL BISULFATE 75 MG: 75 TABLET ORAL at 09:02

## 2017-01-01 RX ADMIN — INSULIN ASPART 3 UNITS: 100 INJECTION, SOLUTION INTRAVENOUS; SUBCUTANEOUS at 12:43

## 2017-01-01 RX ADMIN — LIDOCAINE 1 PATCH: 50 PATCH CUTANEOUS at 21:58

## 2017-01-01 RX ADMIN — BUDESONIDE 0.5 MG: 0.5 INHALANT RESPIRATORY (INHALATION) at 20:37

## 2017-01-01 RX ADMIN — CLOPIDOGREL BISULFATE 75 MG: 75 TABLET ORAL at 09:21

## 2017-01-01 RX ADMIN — DIPHENHYDRAMINE HYDROCHLORIDE 25 MG: 50 INJECTION, SOLUTION INTRAMUSCULAR; INTRAVENOUS at 23:11

## 2017-01-01 RX ADMIN — HYDROXYZINE PAMOATE 25 MG: 25 CAPSULE ORAL at 10:38

## 2017-01-01 RX ADMIN — SACUBITRIL AND VALSARTAN 1 TABLET: 24; 26 TABLET, FILM COATED ORAL at 09:03

## 2017-01-01 RX ADMIN — BUDESONIDE 0.5 MG: 0.5 INHALANT RESPIRATORY (INHALATION) at 07:10

## 2017-01-01 RX ADMIN — IPRATROPIUM BROMIDE AND ALBUTEROL SULFATE 3 ML: .5; 3 SOLUTION RESPIRATORY (INHALATION) at 00:15

## 2017-01-01 RX ADMIN — BUDESONIDE 0.5 MG: 0.5 INHALANT RESPIRATORY (INHALATION) at 20:14

## 2017-01-01 RX ADMIN — ROPINIROLE HYDROCHLORIDE 1 MG: 1 TABLET, FILM COATED ORAL at 21:55

## 2017-01-01 RX ADMIN — POLYMYXIN B SULFATE, BACITRACIN ZINC, NEOMYCIN SULFATE: 5000; 3.5; 4 OINTMENT TOPICAL at 14:00

## 2017-01-01 RX ADMIN — CLOPIDOGREL BISULFATE 75 MG: 75 TABLET ORAL at 08:22

## 2017-01-01 RX ADMIN — LACTULOSE 20 G: 10 SOLUTION ORAL at 17:41

## 2017-01-01 RX ADMIN — LOSARTAN POTASSIUM 50 MG: 50 TABLET, FILM COATED ORAL at 08:32

## 2017-01-01 RX ADMIN — SPIRONOLACTONE 25 MG: 25 TABLET, FILM COATED ORAL at 10:25

## 2017-01-01 RX ADMIN — HYDROXYZINE PAMOATE 25 MG: 25 CAPSULE ORAL at 05:44

## 2017-01-01 RX ADMIN — ALPRAZOLAM 0.5 MG: 0.5 TABLET ORAL at 23:09

## 2017-01-01 RX ADMIN — HYDROXYZINE PAMOATE 25 MG: 25 CAPSULE ORAL at 20:56

## 2017-01-01 RX ADMIN — LEVOTHYROXINE SODIUM 125 MCG: 125 TABLET ORAL at 06:32

## 2017-01-01 RX ADMIN — SALINE NASAL SPRAY 2 SPRAY: 1.5 SOLUTION NASAL at 16:52

## 2017-01-01 RX ADMIN — PHENYLEPHRINE HYDROCHLORIDE 2 SPRAY: 0.5 SPRAY NASAL at 21:31

## 2017-01-01 RX ADMIN — PANTOPRAZOLE SODIUM 40 MG: 40 TABLET, DELAYED RELEASE ORAL at 17:38

## 2017-01-01 RX ADMIN — ATORVASTATIN CALCIUM 20 MG: 20 TABLET, FILM COATED ORAL at 22:01

## 2017-01-01 RX ADMIN — Medication 10 ML: at 17:47

## 2017-01-01 RX ADMIN — HYDROXYZINE PAMOATE 25 MG: 25 CAPSULE ORAL at 03:49

## 2017-01-01 RX ADMIN — LOSARTAN POTASSIUM 50 MG: 50 TABLET, FILM COATED ORAL at 17:09

## 2017-01-01 RX ADMIN — ENOXAPARIN SODIUM 30 MG: 30 INJECTION SUBCUTANEOUS at 09:02

## 2017-01-01 RX ADMIN — IPRATROPIUM BROMIDE AND ALBUTEROL SULFATE 3 ML: .5; 3 SOLUTION RESPIRATORY (INHALATION) at 18:56

## 2017-01-01 RX ADMIN — FUROSEMIDE 40 MG: 10 INJECTION, SOLUTION INTRAMUSCULAR; INTRAVENOUS at 17:26

## 2017-01-01 RX ADMIN — PANTOPRAZOLE SODIUM 40 MG: 40 TABLET, DELAYED RELEASE ORAL at 08:23

## 2017-01-01 RX ADMIN — LACTULOSE 20 G: 10 SOLUTION ORAL at 08:21

## 2017-01-01 RX ADMIN — HYDROXYZINE PAMOATE 25 MG: 25 CAPSULE ORAL at 22:28

## 2017-01-01 RX ADMIN — ROPINIROLE HYDROCHLORIDE 1 MG: 1 TABLET, FILM COATED ORAL at 20:06

## 2017-01-01 RX ADMIN — IPRATROPIUM BROMIDE AND ALBUTEROL SULFATE 3 ML: .5; 3 SOLUTION RESPIRATORY (INHALATION) at 07:18

## 2017-01-01 RX ADMIN — CARBIDOPA AND LEVODOPA 1 TABLET: 25; 100 TABLET ORAL at 16:31

## 2017-01-01 RX ADMIN — INSULIN DETEMIR 22 UNITS: 100 INJECTION, SOLUTION SUBCUTANEOUS at 22:03

## 2017-01-01 RX ADMIN — INSULIN DETEMIR 22 UNITS: 100 INJECTION, SOLUTION SUBCUTANEOUS at 21:15

## 2017-01-01 RX ADMIN — FUROSEMIDE 40 MG: 10 INJECTION, SOLUTION INTRAMUSCULAR; INTRAVENOUS at 10:01

## 2017-01-01 RX ADMIN — ESCITALOPRAM OXALATE 10 MG: 10 TABLET ORAL at 08:33

## 2017-01-01 RX ADMIN — SACUBITRIL AND VALSARTAN 1 TABLET: 24; 26 TABLET, FILM COATED ORAL at 08:58

## 2017-01-01 RX ADMIN — DOCUSATE SODIUM 100 MG: 100 CAPSULE, LIQUID FILLED ORAL at 17:41

## 2017-01-01 RX ADMIN — BUPROPION HYDROCHLORIDE 150 MG: 150 TABLET, FILM COATED, EXTENDED RELEASE ORAL at 06:44

## 2017-01-01 RX ADMIN — CALCIUM GLUCONATE 1 G: 94 INJECTION, SOLUTION INTRAVENOUS at 01:35

## 2017-01-01 RX ADMIN — PANTOPRAZOLE SODIUM 40 MG: 40 TABLET, DELAYED RELEASE ORAL at 17:06

## 2017-01-01 RX ADMIN — LOSARTAN POTASSIUM 50 MG: 50 TABLET, FILM COATED ORAL at 10:09

## 2017-01-01 RX ADMIN — LOSARTAN POTASSIUM 50 MG: 50 TABLET, FILM COATED ORAL at 17:38

## 2017-01-01 RX ADMIN — Medication 10 ML: at 08:58

## 2017-01-01 RX ADMIN — FUROSEMIDE 40 MG: 40 TABLET ORAL at 09:35

## 2017-01-01 RX ADMIN — FUROSEMIDE 40 MG: 10 INJECTION, SOLUTION INTRAMUSCULAR; INTRAVENOUS at 10:25

## 2017-01-01 RX ADMIN — IPRATROPIUM BROMIDE AND ALBUTEROL SULFATE 3 ML: .5; 3 SOLUTION RESPIRATORY (INHALATION) at 07:00

## 2017-01-01 RX ADMIN — ACETAMINOPHEN 650 MG: 325 TABLET, FILM COATED ORAL at 02:19

## 2017-01-01 RX ADMIN — CLOPIDOGREL BISULFATE 75 MG: 75 TABLET ORAL at 09:03

## 2017-01-01 RX ADMIN — FUROSEMIDE 40 MG: 10 INJECTION, SOLUTION INTRAMUSCULAR; INTRAVENOUS at 09:19

## 2017-01-01 RX ADMIN — DICYCLOMINE HYDROCHLORIDE 10 MG: 10 CAPSULE ORAL at 20:32

## 2017-01-01 RX ADMIN — AMMONIUM LACTATE: 120 LOTION TOPICAL at 09:23

## 2017-01-01 RX ADMIN — AMMONIUM LACTATE: 120 LOTION TOPICAL at 01:44

## 2017-01-01 RX ADMIN — HYDROXYZINE PAMOATE 25 MG: 25 CAPSULE ORAL at 11:49

## 2017-01-01 RX ADMIN — POLYETHYLENE GLYCOL 3350 17 G: 17 POWDER, FOR SOLUTION ORAL at 08:54

## 2017-01-01 RX ADMIN — BISOPROLOL FUMARATE 10 MG: 5 TABLET ORAL at 10:01

## 2017-01-01 RX ADMIN — ROPINIROLE HYDROCHLORIDE 1 MG: 1 TABLET, FILM COATED ORAL at 20:35

## 2017-01-01 RX ADMIN — SODIUM CHLORIDE 1000 ML: 9 INJECTION, SOLUTION INTRAVENOUS at 01:37

## 2017-01-01 RX ADMIN — SACUBITRIL AND VALSARTAN 1 TABLET: 24; 26 TABLET, FILM COATED ORAL at 08:24

## 2017-01-01 RX ADMIN — FUROSEMIDE 40 MG: 10 INJECTION, SOLUTION INTRAMUSCULAR; INTRAVENOUS at 18:03

## 2017-01-01 RX ADMIN — ACETAMINOPHEN 500 MG: 500 TABLET, COATED ORAL at 11:13

## 2017-01-01 RX ADMIN — ALPRAZOLAM 0.5 MG: 0.5 TABLET ORAL at 22:50

## 2017-01-01 RX ADMIN — DOCUSATE SODIUM 100 MG: 100 CAPSULE, LIQUID FILLED ORAL at 08:02

## 2017-01-01 RX ADMIN — MELATONIN TAB 3 MG 3 MG: 3 TAB at 20:56

## 2017-01-01 RX ADMIN — CETIRIZINE HYDROCHLORIDE 5 MG: 10 TABLET, FILM COATED ORAL at 08:54

## 2017-01-01 RX ADMIN — LIDOCAINE 1 PATCH: 50 PATCH CUTANEOUS at 09:23

## 2017-01-01 RX ADMIN — LACTULOSE 20 G: 10 SOLUTION ORAL at 17:12

## 2017-01-01 RX ADMIN — CLOPIDOGREL BISULFATE 75 MG: 75 TABLET ORAL at 08:54

## 2017-01-01 RX ADMIN — BUDESONIDE 0.5 MG: 0.5 INHALANT RESPIRATORY (INHALATION) at 07:37

## 2017-01-01 RX ADMIN — ALPRAZOLAM 0.5 MG: 0.5 TABLET ORAL at 21:50

## 2017-01-01 RX ADMIN — AMMONIUM LACTATE: 120 LOTION TOPICAL at 09:03

## 2017-01-01 RX ADMIN — POLYETHYLENE GLYCOL 3350 17 G: 17 POWDER, FOR SOLUTION ORAL at 12:54

## 2017-01-01 RX ADMIN — ESCITALOPRAM OXALATE 10 MG: 10 TABLET ORAL at 10:24

## 2017-01-01 RX ADMIN — INSULIN DETEMIR 40 UNITS: 100 INJECTION, SOLUTION SUBCUTANEOUS at 20:19

## 2017-01-01 RX ADMIN — IPRATROPIUM BROMIDE AND ALBUTEROL SULFATE 3 ML: .5; 3 SOLUTION RESPIRATORY (INHALATION) at 06:54

## 2017-01-01 RX ADMIN — PHENYLEPHRINE HYDROCHLORIDE 2 SPRAY: 0.5 SPRAY NASAL at 03:30

## 2017-01-01 RX ADMIN — PANTOPRAZOLE SODIUM 40 MG: 40 TABLET, DELAYED RELEASE ORAL at 08:54

## 2017-01-01 RX ADMIN — BUDESONIDE 0.5 MG: 0.5 INHALANT RESPIRATORY (INHALATION) at 16:41

## 2017-01-01 RX ADMIN — FUROSEMIDE 40 MG: 10 INJECTION, SOLUTION INTRAMUSCULAR; INTRAVENOUS at 08:23

## 2017-01-01 RX ADMIN — BISOPROLOL FUMARATE 10 MG: 5 TABLET ORAL at 08:23

## 2017-01-01 RX ADMIN — HYDROXYZINE PAMOATE 25 MG: 25 CAPSULE ORAL at 04:38

## 2017-01-01 RX ADMIN — Medication 10 ML: at 18:54

## 2017-01-01 RX ADMIN — POLYMYXIN B SULFATE, BACITRACIN ZINC, NEOMYCIN SULFATE: 5000; 3.5; 4 OINTMENT TOPICAL at 14:06

## 2017-01-01 RX ADMIN — LEVOTHYROXINE SODIUM 125 MCG: 125 TABLET ORAL at 06:02

## 2017-01-01 RX ADMIN — ALPRAZOLAM 0.5 MG: 0.5 TABLET ORAL at 15:52

## 2017-01-01 RX ADMIN — BUDESONIDE AND FORMOTEROL FUMARATE DIHYDRATE 2 PUFF: 160; 4.5 AEROSOL RESPIRATORY (INHALATION) at 20:20

## 2017-01-01 RX ADMIN — PANTOPRAZOLE SODIUM 40 MG: 40 TABLET, DELAYED RELEASE ORAL at 17:31

## 2017-01-01 RX ADMIN — PHENYLEPHRINE HYDROCHLORIDE 2 SPRAY: 0.5 SPRAY NASAL at 16:53

## 2017-01-01 RX ADMIN — FUROSEMIDE 20 MG: 10 INJECTION, SOLUTION INTRAMUSCULAR; INTRAVENOUS at 18:54

## 2017-01-01 RX ADMIN — HYDROCORTISONE: 25 CREAM TOPICAL at 08:23

## 2017-01-01 RX ADMIN — ALPRAZOLAM 0.5 MG: 0.5 TABLET ORAL at 12:28

## 2017-01-01 RX ADMIN — LOSARTAN POTASSIUM 50 MG: 50 TABLET, FILM COATED ORAL at 18:27

## 2017-01-01 RX ADMIN — LEVOTHYROXINE SODIUM 125 MCG: 125 TABLET ORAL at 06:19

## 2017-01-01 RX ADMIN — ROPINIROLE HYDROCHLORIDE 1 MG: 1 TABLET, FILM COATED ORAL at 21:45

## 2017-01-01 RX ADMIN — LEVOTHYROXINE SODIUM 125 MCG: 125 TABLET ORAL at 06:21

## 2017-01-01 RX ADMIN — DOCUSATE SODIUM 100 MG: 100 CAPSULE, LIQUID FILLED ORAL at 10:09

## 2017-01-01 RX ADMIN — BISOPROLOL FUMARATE 5 MG: 5 TABLET ORAL at 09:02

## 2017-01-01 RX ADMIN — LACTULOSE 20 G: 10 SOLUTION ORAL at 17:54

## 2017-01-01 RX ADMIN — HUMAN INSULIN 10 UNITS: 100 INJECTION, SOLUTION SUBCUTANEOUS at 04:17

## 2017-01-01 RX ADMIN — HYDROXYZINE PAMOATE 25 MG: 25 CAPSULE ORAL at 04:27

## 2017-01-01 RX ADMIN — PHENYLEPHRINE HYDROCHLORIDE 2 SPRAY: 0.5 SPRAY NASAL at 10:26

## 2017-01-01 RX ADMIN — DOCUSATE SODIUM 100 MG: 100 CAPSULE, LIQUID FILLED ORAL at 17:38

## 2017-01-01 RX ADMIN — POLYMYXIN B SULFATE, BACITRACIN ZINC, NEOMYCIN SULFATE: 5000; 3.5; 4 OINTMENT TOPICAL at 21:47

## 2017-01-01 RX ADMIN — INSULIN ASPART 2 UNITS: 100 INJECTION, SOLUTION INTRAVENOUS; SUBCUTANEOUS at 06:31

## 2017-01-01 RX ADMIN — HYDROXYZINE PAMOATE 25 MG: 25 CAPSULE ORAL at 21:13

## 2017-01-01 RX ADMIN — BISACODYL 10 MG: 5 TABLET, COATED ORAL at 12:53

## 2017-01-01 RX ADMIN — MELATONIN TAB 3 MG 3 MG: 3 TAB at 02:18

## 2017-01-01 RX ADMIN — ALPRAZOLAM 0.5 MG: 0.5 TABLET ORAL at 20:56

## 2017-01-01 RX ADMIN — SACUBITRIL AND VALSARTAN 1 TABLET: 24; 26 TABLET, FILM COATED ORAL at 22:01

## 2017-01-01 RX ADMIN — SACUBITRIL AND VALSARTAN 1 TABLET: 24; 26 TABLET, FILM COATED ORAL at 20:46

## 2017-01-01 RX ADMIN — SACUBITRIL AND VALSARTAN 1 TABLET: 24; 26 TABLET, FILM COATED ORAL at 21:15

## 2017-01-01 RX ADMIN — SODIUM CHLORIDE 500 ML: 9 INJECTION, SOLUTION INTRAVENOUS at 02:44

## 2017-01-01 RX ADMIN — ONDANSETRON 4 MG: 2 INJECTION INTRAMUSCULAR; INTRAVENOUS at 01:38

## 2017-01-01 RX ADMIN — ALPRAZOLAM 0.5 MG: 0.5 TABLET ORAL at 12:03

## 2017-01-01 RX ADMIN — ALPRAZOLAM 0.5 MG: 0.5 TABLET ORAL at 16:51

## 2017-01-01 RX ADMIN — LIDOCAINE 1 PATCH: 50 PATCH CUTANEOUS at 20:46

## 2017-01-01 RX ADMIN — INSULIN ASPART 3 UNITS: 100 INJECTION, SOLUTION INTRAVENOUS; SUBCUTANEOUS at 21:16

## 2017-01-01 RX ADMIN — POLYMYXIN B SULFATE, BACITRACIN ZINC, NEOMYCIN SULFATE 1 APPLICATION: 5000; 3.5; 4 OINTMENT TOPICAL at 14:15

## 2017-01-01 RX ADMIN — BUPROPION HYDROCHLORIDE 150 MG: 150 TABLET, FILM COATED, EXTENDED RELEASE ORAL at 06:12

## 2017-01-01 RX ADMIN — ROPINIROLE HYDROCHLORIDE 1 MG: 1 TABLET, FILM COATED ORAL at 21:38

## 2017-01-01 RX ADMIN — FUROSEMIDE 20 MG: 20 TABLET ORAL at 08:23

## 2017-01-01 RX ADMIN — ALPRAZOLAM 0.5 MG: 0.5 TABLET ORAL at 09:03

## 2017-01-01 RX ADMIN — BUDESONIDE AND FORMOTEROL FUMARATE DIHYDRATE 2 PUFF: 160; 4.5 AEROSOL RESPIRATORY (INHALATION) at 07:39

## 2017-01-01 RX ADMIN — HYDROXYZINE PAMOATE 25 MG: 25 CAPSULE ORAL at 19:48

## 2017-01-01 RX ADMIN — LEVOTHYROXINE SODIUM 125 MCG: 125 TABLET ORAL at 05:55

## 2017-01-01 RX ADMIN — BUDESONIDE 0.5 MG: 0.5 INHALANT RESPIRATORY (INHALATION) at 06:54

## 2017-01-01 RX ADMIN — TRAMADOL HYDROCHLORIDE 50 MG: 50 TABLET, COATED ORAL at 06:35

## 2017-01-01 RX ADMIN — ACETAMINOPHEN 650 MG: 325 TABLET, FILM COATED ORAL at 23:24

## 2017-01-01 RX ADMIN — Medication 1 EACH: at 11:51

## 2017-01-01 RX ADMIN — ROPINIROLE HYDROCHLORIDE 1 MG: 1 TABLET, FILM COATED ORAL at 22:01

## 2017-01-01 RX ADMIN — CLONAZEPAM 0.5 MG: 0.5 TABLET ORAL at 00:56

## 2017-01-01 RX ADMIN — CARBIDOPA AND LEVODOPA 1 TABLET: 25; 100 TABLET ORAL at 23:24

## 2017-01-01 RX ADMIN — SODIUM POLYSTYRENE SULFONATE 30 G: 15 SUSPENSION ORAL; RECTAL at 16:18

## 2017-01-01 RX ADMIN — LEVOTHYROXINE SODIUM 125 MCG: 125 TABLET ORAL at 06:44

## 2017-01-01 RX ADMIN — Medication 10 ML: at 10:03

## 2017-01-01 RX ADMIN — INSULIN ASPART 2 UNITS: 100 INJECTION, SOLUTION INTRAVENOUS; SUBCUTANEOUS at 11:28

## 2017-01-01 RX ADMIN — POLYMYXIN B SULFATE, BACITRACIN ZINC, NEOMYCIN SULFATE: 5000; 3.5; 4 OINTMENT TOPICAL at 15:11

## 2017-01-01 RX ADMIN — SPIRONOLACTONE 25 MG: 25 TABLET, FILM COATED ORAL at 08:24

## 2017-01-01 RX ADMIN — POLYMYXIN B SULFATE, BACITRACIN ZINC, NEOMYCIN SULFATE: 5000; 3.5; 4 OINTMENT TOPICAL at 06:32

## 2017-01-01 RX ADMIN — LEVOTHYROXINE SODIUM 125 MCG: 125 TABLET ORAL at 05:44

## 2017-01-01 RX ADMIN — HYDROCORTISONE: 25 CREAM TOPICAL at 22:52

## 2017-01-01 RX ADMIN — DIPHENHYDRAMINE HYDROCHLORIDE 25 MG: 50 INJECTION, SOLUTION INTRAMUSCULAR; INTRAVENOUS at 00:58

## 2017-01-01 RX ADMIN — INSULIN ASPART 2 UNITS: 100 INJECTION, SOLUTION INTRAVENOUS; SUBCUTANEOUS at 11:42

## 2017-01-01 RX ADMIN — SODIUM CHLORIDE 75 ML/HR: 9 INJECTION, SOLUTION INTRAVENOUS at 06:20

## 2017-01-01 RX ADMIN — MIRTAZAPINE 45 MG: 15 TABLET, FILM COATED ORAL at 21:55

## 2017-01-01 RX ADMIN — SPIRONOLACTONE 25 MG: 25 TABLET, FILM COATED ORAL at 09:00

## 2017-01-01 RX ADMIN — BUPROPION HYDROCHLORIDE 150 MG: 150 TABLET, FILM COATED, EXTENDED RELEASE ORAL at 06:32

## 2017-01-01 RX ADMIN — BISOPROLOL FUMARATE 10 MG: 5 TABLET ORAL at 10:24

## 2017-01-01 RX ADMIN — DOCUSATE SODIUM 100 MG: 100 CAPSULE, LIQUID FILLED ORAL at 08:54

## 2017-01-01 RX ADMIN — FUROSEMIDE 20 MG: 20 TABLET ORAL at 17:12

## 2017-01-01 RX ADMIN — ROPINIROLE HYDROCHLORIDE 1 MG: 1 TABLET, FILM COATED ORAL at 20:56

## 2017-01-01 RX ADMIN — BUDESONIDE 0.5 MG: 0.5 INHALANT RESPIRATORY (INHALATION) at 08:17

## 2017-01-01 RX ADMIN — PANTOPRAZOLE SODIUM 40 MG: 40 TABLET, DELAYED RELEASE ORAL at 10:24

## 2017-01-01 RX ADMIN — ALPRAZOLAM 0.5 MG: 0.5 TABLET ORAL at 04:17

## 2017-01-01 RX ADMIN — BUDESONIDE 0.5 MG: 0.5 INHALANT RESPIRATORY (INHALATION) at 23:16

## 2017-01-01 RX ADMIN — PANTOPRAZOLE SODIUM 40 MG: 40 TABLET, DELAYED RELEASE ORAL at 10:09

## 2017-01-01 RX ADMIN — POLYMYXIN B SULFATE, BACITRACIN ZINC, NEOMYCIN SULFATE: 5000; 3.5; 4 OINTMENT TOPICAL at 13:43

## 2017-01-01 RX ADMIN — SACUBITRIL AND VALSARTAN 1 TABLET: 24; 26 TABLET, FILM COATED ORAL at 20:35

## 2017-01-01 RX ADMIN — HYDROXYZINE PAMOATE 25 MG: 25 CAPSULE ORAL at 12:54

## 2017-01-01 RX ADMIN — SODIUM CHLORIDE 125 ML/HR: 9 INJECTION, SOLUTION INTRAVENOUS at 01:41

## 2017-01-01 RX ADMIN — ESCITALOPRAM OXALATE 10 MG: 10 TABLET ORAL at 08:22

## 2017-01-01 RX ADMIN — BUDESONIDE AND FORMOTEROL FUMARATE DIHYDRATE 2 PUFF: 160; 4.5 AEROSOL RESPIRATORY (INHALATION) at 21:43

## 2017-01-01 RX ADMIN — BUPROPION HYDROCHLORIDE 150 MG: 150 TABLET, FILM COATED, EXTENDED RELEASE ORAL at 06:31

## 2017-01-01 RX ADMIN — Medication 10 ML: at 17:26

## 2017-01-01 RX ADMIN — INSULIN ASPART 4 UNITS: 100 INJECTION, SOLUTION INTRAVENOUS; SUBCUTANEOUS at 12:03

## 2017-01-01 RX ADMIN — MORPHINE SULFATE 4 MG: 4 INJECTION, SOLUTION INTRAMUSCULAR; INTRAVENOUS at 02:45

## 2017-01-01 RX ADMIN — PANTOPRAZOLE SODIUM 40 MG: 40 TABLET, DELAYED RELEASE ORAL at 08:24

## 2017-01-01 RX ADMIN — ALPRAZOLAM 0.5 MG: 0.5 TABLET ORAL at 00:33

## 2017-01-01 RX ADMIN — HYDROCORTISONE: 25 CREAM TOPICAL at 09:47

## 2017-01-01 RX ADMIN — ATORVASTATIN CALCIUM 20 MG: 20 TABLET, FILM COATED ORAL at 21:29

## 2017-01-01 RX ADMIN — PANTOPRAZOLE SODIUM 40 MG: 40 TABLET, DELAYED RELEASE ORAL at 09:02

## 2017-01-01 RX ADMIN — SPIRONOLACTONE 25 MG: 25 TABLET, FILM COATED ORAL at 08:32

## 2017-01-01 RX ADMIN — ALPRAZOLAM 0.5 MG: 0.5 TABLET ORAL at 17:55

## 2017-01-01 RX ADMIN — PHENYLEPHRINE HYDROCHLORIDE 2 SPRAY: 0.5 SPRAY NASAL at 22:02

## 2017-01-01 RX ADMIN — ATORVASTATIN CALCIUM 20 MG: 20 TABLET, FILM COATED ORAL at 21:15

## 2017-01-01 RX ADMIN — PANTOPRAZOLE SODIUM 40 MG: 40 TABLET, DELAYED RELEASE ORAL at 17:41

## 2017-01-01 RX ADMIN — HYDROXYZINE PAMOATE 25 MG: 25 CAPSULE ORAL at 13:24

## 2017-01-01 RX ADMIN — SODIUM CHLORIDE 125 ML/HR: 9 INJECTION, SOLUTION INTRAVENOUS at 04:30

## 2017-01-01 RX ADMIN — MIRTAZAPINE 45 MG: 15 TABLET, FILM COATED ORAL at 21:05

## 2017-01-01 RX ADMIN — BUPROPION HYDROCHLORIDE 150 MG: 150 TABLET, FILM COATED, EXTENDED RELEASE ORAL at 05:47

## 2017-01-01 RX ADMIN — DOCUSATE SODIUM 100 MG: 100 CAPSULE, LIQUID FILLED ORAL at 17:06

## 2017-01-01 RX ADMIN — LACTULOSE 20 G: 10 SOLUTION ORAL at 09:02

## 2017-01-01 RX ADMIN — MIRTAZAPINE 45 MG: 15 TABLET, FILM COATED ORAL at 20:29

## 2017-01-01 RX ADMIN — DIAZEPAM 5 MG: 5 INJECTION, SOLUTION INTRAMUSCULAR; INTRAVENOUS at 00:15

## 2017-01-01 RX ADMIN — MELATONIN TAB 3 MG 3 MG: 3 TAB at 22:01

## 2017-01-01 RX ADMIN — ATORVASTATIN CALCIUM 20 MG: 20 TABLET, FILM COATED ORAL at 21:55

## 2017-01-01 RX ADMIN — PANTOPRAZOLE SODIUM 40 MG: 40 TABLET, DELAYED RELEASE ORAL at 18:27

## 2017-01-01 RX ADMIN — BISOPROLOL FUMARATE 10 MG: 5 TABLET ORAL at 09:35

## 2017-01-01 RX ADMIN — BUPROPION HYDROCHLORIDE 150 MG: 150 TABLET, FILM COATED, EXTENDED RELEASE ORAL at 06:02

## 2017-01-01 RX ADMIN — ROPINIROLE HYDROCHLORIDE 1 MG: 1 TABLET, FILM COATED ORAL at 20:29

## 2017-01-01 RX ADMIN — BISOPROLOL FUMARATE 5 MG: 5 TABLET ORAL at 08:59

## 2017-01-01 RX ADMIN — SODIUM CHLORIDE 100 ML/HR: 9 INJECTION, SOLUTION INTRAVENOUS at 21:20

## 2017-01-01 RX ADMIN — FUROSEMIDE 20 MG: 10 INJECTION, SOLUTION INTRAMUSCULAR; INTRAVENOUS at 06:37

## 2017-01-01 RX ADMIN — Medication 10 ML: at 17:31

## 2017-01-01 RX ADMIN — ROPINIROLE HYDROCHLORIDE 1 MG: 1 TABLET, FILM COATED ORAL at 04:17

## 2017-01-01 RX ADMIN — FUROSEMIDE 20 MG: 20 TABLET ORAL at 10:23

## 2017-01-01 RX ADMIN — LACTULOSE 20 G: 10 SOLUTION ORAL at 17:06

## 2017-01-01 RX ADMIN — IPRATROPIUM BROMIDE AND ALBUTEROL SULFATE 3 ML: .5; 3 SOLUTION RESPIRATORY (INHALATION) at 13:24

## 2017-01-01 RX ADMIN — POLYMYXIN B SULFATE, BACITRACIN ZINC, NEOMYCIN SULFATE: 5000; 3.5; 4 OINTMENT TOPICAL at 09:05

## 2017-01-01 RX ADMIN — ONDANSETRON 4 MG: 2 INJECTION INTRAMUSCULAR; INTRAVENOUS at 00:07

## 2017-01-01 RX ADMIN — BUPROPION HYDROCHLORIDE 150 MG: 150 TABLET, FILM COATED, EXTENDED RELEASE ORAL at 06:33

## 2017-01-01 RX ADMIN — IPRATROPIUM BROMIDE AND ALBUTEROL SULFATE 3 ML: .5; 3 SOLUTION RESPIRATORY (INHALATION) at 20:25

## 2017-01-01 RX ADMIN — FUROSEMIDE 40 MG: 10 INJECTION, SOLUTION INTRAMUSCULAR; INTRAVENOUS at 09:02

## 2017-01-01 RX ADMIN — INSULIN DETEMIR 20 UNITS: 100 INJECTION, SOLUTION SUBCUTANEOUS at 21:37

## 2017-01-01 RX ADMIN — FUROSEMIDE 40 MG: 10 INJECTION, SOLUTION INTRAMUSCULAR; INTRAVENOUS at 11:32

## 2017-01-01 RX ADMIN — LACTULOSE 20 G: 10 SOLUTION ORAL at 17:17

## 2017-01-01 RX ADMIN — ONDANSETRON 4 MG: 2 INJECTION INTRAMUSCULAR; INTRAVENOUS at 09:26

## 2017-01-01 RX ADMIN — IPRATROPIUM BROMIDE AND ALBUTEROL SULFATE 3 ML: .5; 3 SOLUTION RESPIRATORY (INHALATION) at 08:07

## 2017-01-01 RX ADMIN — IPRATROPIUM BROMIDE AND ALBUTEROL SULFATE 3 ML: .5; 3 SOLUTION RESPIRATORY (INHALATION) at 22:50

## 2017-01-01 RX ADMIN — DOCUSATE SODIUM 100 MG: 100 CAPSULE, LIQUID FILLED ORAL at 17:12

## 2017-01-01 RX ADMIN — ALPRAZOLAM 0.5 MG: 0.5 TABLET ORAL at 20:37

## 2017-01-01 RX ADMIN — Medication 10 ML: at 09:21

## 2017-01-01 RX ADMIN — ALPRAZOLAM 0.5 MG: 0.5 TABLET ORAL at 05:55

## 2017-01-01 RX ADMIN — CLOPIDOGREL BISULFATE 75 MG: 75 TABLET ORAL at 10:23

## 2017-01-01 RX ADMIN — HYDROXYZINE PAMOATE 25 MG: 25 CAPSULE ORAL at 22:54

## 2017-01-01 RX ADMIN — CLONAZEPAM 0.5 MG: 0.5 TABLET ORAL at 22:12

## 2017-01-01 RX ADMIN — PANTOPRAZOLE SODIUM 40 MG: 40 TABLET, DELAYED RELEASE ORAL at 09:35

## 2017-01-01 RX ADMIN — METOPROLOL TARTRATE 25 MG: 25 TABLET ORAL at 17:41

## 2017-01-01 RX ADMIN — DICYCLOMINE HYDROCHLORIDE 10 MG: 10 CAPSULE ORAL at 17:07

## 2017-01-01 RX ADMIN — INSULIN DETEMIR 20 UNITS: 100 INJECTION, SOLUTION SUBCUTANEOUS at 21:45

## 2017-01-01 RX ADMIN — MELATONIN TAB 3 MG 3 MG: 3 TAB at 20:35

## 2017-01-01 RX ADMIN — INSULIN ASPART 2 UNITS: 100 INJECTION, SOLUTION INTRAVENOUS; SUBCUTANEOUS at 12:12

## 2017-01-01 RX ADMIN — SODIUM CHLORIDE 125 ML/HR: 9 INJECTION, SOLUTION INTRAVENOUS at 22:49

## 2017-01-01 RX ADMIN — HYDROXYZINE PAMOATE 25 MG: 25 CAPSULE ORAL at 02:47

## 2017-01-01 RX ADMIN — DIPHENHYDRAMINE HYDROCHLORIDE 50 MG: 50 INJECTION, SOLUTION INTRAMUSCULAR; INTRAVENOUS at 21:17

## 2017-01-01 RX ADMIN — INSULIN ASPART 3 UNITS: 100 INJECTION, SOLUTION INTRAVENOUS; SUBCUTANEOUS at 06:32

## 2017-01-01 RX ADMIN — CLOPIDOGREL BISULFATE 75 MG: 75 TABLET ORAL at 08:23

## 2017-01-01 RX ADMIN — LACTULOSE 20 G: 10 SOLUTION ORAL at 08:23

## 2017-01-01 RX ADMIN — BUDESONIDE AND FORMOTEROL FUMARATE DIHYDRATE 2 PUFF: 160; 4.5 AEROSOL RESPIRATORY (INHALATION) at 06:54

## 2017-01-01 RX ADMIN — ESCITALOPRAM OXALATE 10 MG: 10 TABLET ORAL at 10:09

## 2017-01-01 RX ADMIN — SODIUM CHLORIDE SOLN NEBU 3% 4 ML: 3 NEBU SOLN at 23:16

## 2017-01-01 RX ADMIN — INSULIN ASPART 2 UNITS: 100 INJECTION, SOLUTION INTRAVENOUS; SUBCUTANEOUS at 06:32

## 2017-01-01 RX ADMIN — IPRATROPIUM BROMIDE AND ALBUTEROL SULFATE 3 ML: .5; 3 SOLUTION RESPIRATORY (INHALATION) at 01:27

## 2017-01-01 RX ADMIN — ENOXAPARIN SODIUM 30 MG: 30 INJECTION SUBCUTANEOUS at 08:36

## 2017-01-01 RX ADMIN — HYDROXYZINE PAMOATE 25 MG: 25 CAPSULE ORAL at 19:37

## 2017-01-01 RX ADMIN — MIRTAZAPINE 45 MG: 15 TABLET, FILM COATED ORAL at 22:01

## 2017-01-01 RX ADMIN — IPRATROPIUM BROMIDE AND ALBUTEROL SULFATE 3 ML: .5; 3 SOLUTION RESPIRATORY (INHALATION) at 00:37

## 2017-01-01 RX ADMIN — INSULIN ASPART 5 UNITS: 100 INJECTION, SOLUTION INTRAVENOUS; SUBCUTANEOUS at 17:26

## 2017-01-01 RX ADMIN — PROMETHAZINE HYDROCHLORIDE 12.5 MG: 25 INJECTION, SOLUTION INTRAMUSCULAR; INTRAVENOUS at 04:32

## 2017-01-01 RX ADMIN — HYDROCORTISONE: 25 CREAM TOPICAL at 09:22

## 2017-01-01 RX ADMIN — ONDANSETRON 4 MG: 2 INJECTION INTRAMUSCULAR; INTRAVENOUS at 12:36

## 2017-01-01 RX ADMIN — HYDROXYZINE PAMOATE 25 MG: 25 CAPSULE ORAL at 21:14

## 2017-01-01 RX ADMIN — PANTOPRAZOLE SODIUM 40 MG: 40 TABLET, DELAYED RELEASE ORAL at 16:57

## 2017-01-01 RX ADMIN — ALPRAZOLAM 0.5 MG: 0.5 TABLET ORAL at 19:48

## 2017-01-01 RX ADMIN — BUDESONIDE 0.5 MG: 0.5 INHALANT RESPIRATORY (INHALATION) at 07:18

## 2017-01-01 RX ADMIN — INSULIN ASPART 3 UNITS: 100 INJECTION, SOLUTION INTRAVENOUS; SUBCUTANEOUS at 21:45

## 2017-01-01 RX ADMIN — ALPRAZOLAM 0.5 MG: 0.5 TABLET ORAL at 10:25

## 2017-01-01 RX ADMIN — BUDESONIDE AND FORMOTEROL FUMARATE DIHYDRATE 2 PUFF: 160; 4.5 AEROSOL RESPIRATORY (INHALATION) at 07:35

## 2017-01-01 RX ADMIN — BISACODYL 10 MG: 10 SUPPOSITORY RECTAL at 10:12

## 2017-01-01 RX ADMIN — MIRTAZAPINE 45 MG: 15 TABLET, FILM COATED ORAL at 20:46

## 2017-01-01 RX ADMIN — POLYETHYLENE GLYCOL 3350 17 G: 17 POWDER, FOR SOLUTION ORAL at 10:10

## 2017-01-01 RX ADMIN — ESCITALOPRAM OXALATE 10 MG: 10 TABLET ORAL at 08:23

## 2017-01-01 RX ADMIN — SODIUM CHLORIDE SOLN NEBU 3% 4 ML: 3 NEBU SOLN at 00:15

## 2017-01-01 RX ADMIN — INSULIN ASPART 4 UNITS: 100 INJECTION, SOLUTION INTRAVENOUS; SUBCUTANEOUS at 11:14

## 2017-01-01 RX ADMIN — ALPRAZOLAM 0.5 MG: 0.5 TABLET ORAL at 03:49

## 2017-01-01 RX ADMIN — Medication: at 11:51

## 2017-01-01 RX ADMIN — IPRATROPIUM BROMIDE AND ALBUTEROL SULFATE 3 ML: .5; 3 SOLUTION RESPIRATORY (INHALATION) at 13:08

## 2017-01-01 RX ADMIN — PANTOPRAZOLE SODIUM 40 MG: 40 TABLET, DELAYED RELEASE ORAL at 09:03

## 2017-01-01 RX ADMIN — PANTOPRAZOLE SODIUM 40 MG: 40 TABLET, DELAYED RELEASE ORAL at 21:56

## 2017-01-01 RX ADMIN — INSULIN ASPART 5 UNITS: 100 INJECTION, SOLUTION INTRAVENOUS; SUBCUTANEOUS at 17:18

## 2017-01-01 RX ADMIN — INSULIN DETEMIR 22 UNITS: 100 INJECTION, SOLUTION SUBCUTANEOUS at 20:57

## 2017-01-01 RX ADMIN — BUDESONIDE AND FORMOTEROL FUMARATE DIHYDRATE 2 PUFF: 160; 4.5 AEROSOL RESPIRATORY (INHALATION) at 23:16

## 2017-01-01 RX ADMIN — INSULIN ASPART 10 UNITS: 100 INJECTION, SOLUTION INTRAVENOUS; SUBCUTANEOUS at 17:54

## 2017-01-01 RX ADMIN — ALPRAZOLAM 0.5 MG: 0.5 TABLET ORAL at 09:16

## 2017-01-01 RX ADMIN — LEVOTHYROXINE SODIUM 125 MCG: 125 TABLET ORAL at 06:05

## 2017-01-01 RX ADMIN — FUROSEMIDE 40 MG: 10 INJECTION, SOLUTION INTRAMUSCULAR; INTRAVENOUS at 18:25

## 2017-01-01 RX ADMIN — LEVOTHYROXINE SODIUM 125 MCG: 125 TABLET ORAL at 06:26

## 2017-01-01 RX ADMIN — CLOPIDOGREL BISULFATE 75 MG: 75 TABLET ORAL at 08:32

## 2017-01-01 RX ADMIN — MAGNESIUM HYDROXIDE 10 ML: 2400 SUSPENSION ORAL at 10:12

## 2017-01-01 RX ADMIN — CETIRIZINE HYDROCHLORIDE 5 MG: 10 TABLET, FILM COATED ORAL at 10:08

## 2017-01-01 RX ADMIN — SODIUM CHLORIDE SOLN NEBU 3% 4 ML: 3 NEBU SOLN at 20:37

## 2017-01-01 RX ADMIN — BUDESONIDE AND FORMOTEROL FUMARATE DIHYDRATE 2 PUFF: 160; 4.5 AEROSOL RESPIRATORY (INHALATION) at 19:05

## 2017-01-01 RX ADMIN — INSULIN ASPART 3 UNITS: 100 INJECTION, SOLUTION INTRAVENOUS; SUBCUTANEOUS at 20:37

## 2017-01-01 RX ADMIN — DOCUSATE SODIUM 100 MG: 100 CAPSULE, LIQUID FILLED ORAL at 17:18

## 2017-01-01 RX ADMIN — ONDANSETRON 4 MG: 2 INJECTION INTRAMUSCULAR; INTRAVENOUS at 17:55

## 2017-01-01 RX ADMIN — HYDROXYZINE PAMOATE 25 MG: 25 CAPSULE ORAL at 16:51

## 2017-01-01 RX ADMIN — ONDANSETRON 4 MG: 2 INJECTION INTRAMUSCULAR; INTRAVENOUS at 02:45

## 2017-01-01 RX ADMIN — HYDROXYZINE PAMOATE 25 MG: 25 CAPSULE ORAL at 12:28

## 2017-01-01 RX ADMIN — CARBIDOPA AND LEVODOPA 1 TABLET: 25; 100 TABLET ORAL at 08:54

## 2017-01-01 RX ADMIN — ENOXAPARIN SODIUM 40 MG: 40 INJECTION SUBCUTANEOUS at 09:18

## 2017-01-01 RX ADMIN — LACTULOSE 20 G: 10 SOLUTION ORAL at 08:34

## 2017-01-01 RX ADMIN — HYDROXYZINE PAMOATE 25 MG: 25 CAPSULE ORAL at 00:07

## 2017-01-01 RX ADMIN — ALPRAZOLAM 0.5 MG: 0.5 TABLET ORAL at 14:58

## 2017-01-01 RX ADMIN — DOCUSATE SODIUM 100 MG: 100 CAPSULE, LIQUID FILLED ORAL at 09:02

## 2017-01-01 RX ADMIN — ROPINIROLE HYDROCHLORIDE 1 MG: 1 TABLET, FILM COATED ORAL at 20:19

## 2017-01-01 RX ADMIN — ROPINIROLE HYDROCHLORIDE 1 MG: 1 TABLET, FILM COATED ORAL at 21:05

## 2017-01-01 RX ADMIN — ALPRAZOLAM 0.5 MG: 0.5 TABLET ORAL at 13:29

## 2017-01-01 RX ADMIN — INSULIN DETEMIR 10 UNITS: 100 INJECTION, SOLUTION SUBCUTANEOUS at 17:54

## 2017-01-01 RX ADMIN — BUDESONIDE 0.5 MG: 0.5 INHALANT RESPIRATORY (INHALATION) at 10:05

## 2017-01-01 RX ADMIN — SODIUM POLYSTYRENE SULFONATE 15 G: 15 SUSPENSION ORAL; RECTAL at 04:17

## 2017-01-01 RX ADMIN — BUDESONIDE AND FORMOTEROL FUMARATE DIHYDRATE 2 PUFF: 160; 4.5 AEROSOL RESPIRATORY (INHALATION) at 22:53

## 2017-01-01 RX ADMIN — HYDROXYZINE PAMOATE 25 MG: 25 CAPSULE ORAL at 11:05

## 2017-01-01 RX ADMIN — BUPROPION HYDROCHLORIDE 150 MG: 150 TABLET, FILM COATED, EXTENDED RELEASE ORAL at 06:29

## 2017-01-01 RX ADMIN — AMMONIUM LACTATE: 120 LOTION TOPICAL at 08:07

## 2017-01-01 RX ADMIN — ATORVASTATIN CALCIUM 40 MG: 40 TABLET, FILM COATED ORAL at 20:37

## 2017-01-01 RX ADMIN — PANTOPRAZOLE SODIUM 40 MG: 40 TABLET, DELAYED RELEASE ORAL at 17:26

## 2017-01-01 RX ADMIN — ALPRAZOLAM 0.5 MG: 0.5 TABLET ORAL at 01:42

## 2017-01-01 RX ADMIN — ALPRAZOLAM 0.5 MG: 0.5 TABLET ORAL at 16:13

## 2017-01-01 RX ADMIN — LEVOTHYROXINE SODIUM 125 MCG: 125 TABLET ORAL at 06:30

## 2017-01-01 RX ADMIN — IPRATROPIUM BROMIDE AND ALBUTEROL SULFATE 3 ML: .5; 3 SOLUTION RESPIRATORY (INHALATION) at 13:41

## 2017-01-01 RX ADMIN — ATORVASTATIN CALCIUM 40 MG: 40 TABLET, FILM COATED ORAL at 20:33

## 2017-01-01 RX ADMIN — SALINE NASAL SPRAY 2 SPRAY: 1.5 SOLUTION NASAL at 10:26

## 2017-01-03 NOTE — TELEPHONE ENCOUNTER
----- Message from KATLYN Conde sent at 1/3/2017  2:51 PM EST -----  Please let her know that her chest x-ray did not show any pneumonia. If she is not improving over the next week we need to see her again.

## 2017-01-13 NOTE — TELEPHONE ENCOUNTER
Pt has had nitrostat SL 0.4mg in the past from [facility], it is not on current med list

## 2017-01-13 NOTE — TELEPHONE ENCOUNTER
----- Message from Phoebe Ames sent at 1/13/2017  3:55 PM EST -----  Contact: Patient   Patient called the office stating that she is out of her Nitroglycerin tablets and is wanting to see if Dr. Vera would send her in a Rx for them at the [facility].

## 2017-03-03 PROBLEM — N17.9 ACUTE RENAL FAILURE (HCC): Status: ACTIVE | Noted: 2017-01-01

## 2017-03-03 PROBLEM — N18.31 CHRONIC KIDNEY DISEASE (CKD) STAGE G3A/A1, MODERATELY DECREASED GLOMERULAR FILTRATION RATE (GFR) BETWEEN 45-59 ML/MIN/1.73 SQUARE METER AND ALBUMINURIA CREATININE RATIO LESS THAN 30 MG/G (CMS/H* (HCC): Status: ACTIVE | Noted: 2017-01-01

## 2017-03-03 NOTE — PROGRESS NOTES
Acute Care - Occupational Therapy Initial Evaluation   Brigido     Patient Name: Yudy Vásquez  : 1945  MRN: [id number]  Today's Date: 3/3/2017  Onset of Illness/Injury or Date of Surgery Date: 17  Date of Referral to OT: 17  Referring Physician: Dr. Panda    Admit Date: 3/2/2017       ICD-10-CM ICD-9-CM   1. Acute renal failure, unspecified acute renal failure type N17.9 584.9   2. Elevated liver enzymes R74.8 790.5   3. Hyperkalemia E87.5 276.7   4. Impaired functional mobility, balance, gait, and endurance Z74.09 V49.89   5. Impaired mobility and ADLs Z74.09 799.89     Patient Active Problem List   Diagnosis   • Anemia of chronic disease   • Chronic coronary artery disease   • Chronic systolic CHF (congestive heart failure), NYHA class 3   • Colon polyp   • Decreased hearing   • Chronic hypoxemic respiratory failure   • Osteopenia   • Controlled type 2 diabetes mellitus with circulatory disorder, with long-term current use of insulin   • Essential hypertension   • Psychophysiological insomnia   • Ischemic cardiomyopathy   • GERD (gastroesophageal reflux disease)   • Moderate episode of recurrent major depressive disorder   • 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 attack   • Hypothyroidism due to acquired atrophy of thyroid   • COPD, severe   • PAD (peripheral artery disease)   • Moderate to severe pulmonary hypertension   • Functional diarrhea   • Delayed gastric emptying   • RLS (restless legs syndrome)   • Low bone density   • Community acquired pneumonia   • Acute renal failure   • Chronic kidney disease (CKD) stage G3a/A1, moderately decreased glomerular filtration rate (GFR) between 45-59 mL/min/1.73 square meter and albuminuria creatinine ratio less than 30 mg/g     Past Medical History   Diagnosis Date   • Anemia    • Anxiety    • Arthritis    • Bacterial sinusitis    • Chronic back pain    • Chronic obstructive lung disease    • Colon cancer screening     • Colon polyps       · Last Impression: 09 Dec 2015  History of multiple colon polyps including tubularadenomata.  Maximino Fields (Gastroenterology)   • Constipation    • Depression    • Diarrhea    • Disease of thyroid gland    • Dysphagia 01/20/2013   • Esophageal reflux    • Esophagitis, reflux    • Hernia    • History of bronchitis    • History of cardiac pacemaker    • History of degenerative disc disease    • History of tobacco abuse    • History of urinary tract infection    • Hyperlipidemia    • Hypothyroidism    • Myocardial infarction    • Nausea    • Occult blood in stools      Impression: 12/09/2015 - This could be multifactorial including secondary to small nasal bleed.  However the small nasal bleed does not explain anemia as such.  The patient previously had undergone a colonoscopy in February 2014.  However unfortunately the prep was considered to be suboptimal.;    • Pancreatitis      status post ERCP for common duct stone in 2001   • RLS (restless legs syndrome)      Past Surgical History   Procedure Laterality Date   • Back surgery  1992   • Colonoscopy  2009     Fiberoptic (Onset Date: 2009)   • Internal cardiac defibrillator insertion       Dual Lead Cardioverter-Defibrilator   • Coronary stent placement  1994   • Cardiac pacemaker placement     • Cardiac catheterization     • Pacemaker implantation  1994   • Cholecystectomy  1968   • Tonsillectomy  1955   • Hysterectomy  1998          OT ASSESSMENT FLOWSHEET (last 72 hours)      OT Evaluation       03/03/17 1015 03/03/17 1011 03/03/17 0225          Rehab Evaluation    Document Type evaluation  -AH evaluation  -LM       Subjective Information agree to therapy;complains of;pain  -AH agree to therapy;complains of;pain  -LM       Patient Effort, Rehab Treatment adequate  -AH adequate  -LM       General Information    Patient Profile Review yes  -AH yes  -LM       Onset of Illness/Injury or Date of Surgery Date 03/02/17  -AH 03/02/17  -LM        Referring Physician Dr. Panda  - Amarilys  -       General Observations Pt sitting edge of bed with 3L O2 via nc.  - Pt seated at EOB; O2 per n/c; anxious  -       Pertinent History Of Current Problem Anxiety, Acute renal failure HTN, DM II, CAD, COPD, PAD  - Anxiety  -LM       Precautions/Limitations fall precautions;oxygen therapy device and L/min  - fall precautions;oxygen therapy device and L/min  -LM       Prior Level of Function independent:;all household mobility;bathing;dressing  - independent:;all household mobility  -       Equipment Currently Used at Home oxygen;shower chair   rollator  - oxygen;shower chair;walker, rolling  - oxygen  -      Plans/Goals Discussed With patient;agreed upon  - patient;agreed upon  -       Risks Reviewed patient:;increased discomfort  - patient:;LOB;increased discomfort  -       Benefits Reviewed patient:;improve function;increase independence;increase strength  - patient:;increase independence  -       Barriers to Rehab ineffective coping  - ineffective coping  -       Living Environment    Lives With alone  - alone  - alone  -      Living Arrangements apartment  - apartment  - apartment  -      Home Accessibility stairs to enter home  - stairs to enter home  - no concerns  -      Number of Stairs to Enter Home 2  - 2  -LM       Stair Railings at Home   none  -      Type of Financial/Environmental Concern   none  -      Transportation Available   none  -      Clinical Impression    Date of Referral to OT 03/03/17  -        OT Diagnosis weakness, decreased independence with self care tasks  -        Patient/Family Goals Statement Pt wants to return home and back to her PLOF  -        Criteria for Skilled Therapeutic Interventions Met yes;treatment indicated  -        Rehab Potential good, to achieve stated therapy goals  -        Therapy Frequency 3-5 times/wk  -        Anticipated Discharge  Disposition home with home health  -        Functional Level Prior    Ambulation 0-->independent  -AH  0-->independent  -VL      Transferring 0-->independent  -AH  0-->independent  -VL      Toileting 0-->independent  -AH  0-->independent  -VL      Bathing 0-->independent  -AH  0-->independent  -VL      Dressing 0-->independent  -AH  0-->independent  -VL      Eating 0-->independent  -AH  0-->independent  -VL      Communication 0-->understands/communicates without difficulty  -AH  0-->understands/communicates without difficulty  -      Swallowing   0-->swallows foods/liquids without difficulty  -      Vital Signs    O2 Delivery Pre Treatment supplemental O2   3L  -AH        O2 Delivery Intra Treatment supplemental O2   3L  -AH        O2 Delivery Post Treatment supplemental O2   3L  -AH        Pain Assessment    Pain Assessment 0-10  -AH 0-10  -LM       Pain Score 6  -AH 6  -LM       Post Pain Score 6  -AH 6  -LM       Pain Type Acute pain  - Acute pain  -LM       Pain Location Abdomen  - Abdomen  -LM       Pain Intervention(s) Repositioned;Ambulation/increased activity  - Repositioned;Ambulation/increased activity  -       Multiple Pain Sites --   Pt also c/o pain in B legs, but did not rate numerically.  -        Vision Assessment/Intervention    Visual Impairment WFL with corrective lenses  -        Cognitive Assessment/Intervention    Current Cognitive/Communication Assessment functional  - functional  -       Orientation Status oriented x 4  - oriented x 4  -       Follows Commands/Answers Questions able to follow multi-step instructions  - 100% of the time;needs increased time  -       Personal Safety WNL/WFL  - WNL/WFL  -LM       Personal Safety Interventions gait belt;fall prevention program maintained;nonskid shoes/slippers when out of bed  - gait belt;nonskid shoes/slippers when out of bed;fall prevention program maintained  -       ROM (Range of Motion)    General ROM no  range of motion deficits identified  - no range of motion deficits identified  -LM       MMT (Manual Muscle Testing)    General MMT Assessment no strength deficits identified  - no strength deficits identified  -LM       Bed Mobility, Assessment/Treatment    Bed Mob, Supine to Sit, [place] independent  -        Transfer Assessment/Treatment    Transfers, Sit-Stand [place] contact guard assist  - contact guard assist;verbal cues required  -LM       Transfers, Stand-Sit [place] contact guard assist  - contact guard assist;verbal cues required  -LM       Transfers, Sit-Stand-Sit, Assist Device rolling walker  - rolling walker  -LM       Transfer, Safety Issues  balance decreased during turns  -LM       Transfer, Impairments  strength decreased;impaired balance  -LM       Functional Mobility    Functional Mobility- Ind. Level contact guard assist  -        Functional Mobility- Device rolling walker  -        Functional Mobility-Distance (Feet) 80  -        Functional Mobility- Safety Issues supplemental O2  -        Upper Body Bathing Assessment/Training    UB Bathing Assess/Train, [place] Level independent  -        Lower Body Bathing Assessment/Training    LB Bathing Assess/Train, [place] Level minimum assist (75% patient effort)  -        Upper Body Dressing Assessment/Training    UB Dressing Assess/Train, [place] independent  -        Lower Body Dressing Assessment/Training    LB Dressing Assess/Train, [place] minimum assist (75% patient effort)  -        Toileting Assessment/Training    Toileting Assess/Train, Indepen Level independent  -        Grooming Assessment/Training    Grooming Assess/Train, Indepen Level independent  -        General Therapy Interventions    Planned Therapy Interventions ADL retraining;strengthening;transfer training  -        Positioning and Restraints    Pre-Treatment Position in bed   pt sitting on edge of bed  - in  bed   Pt found sitting EOB.  -LM       Post Treatment Position bed  - bed  -LM       In Bed sitting EOB;call light within reach  - sitting EOB;call light within reach;encouraged to call for assist;patient within staff view;with other staff   Dr. Guzman present at bedside.  -LM         User Key  (r) = Recorded By, (t) = Taken By, (c) = Cosigned By    Initials Name Effective Dates     Katia Dailey 10/26/16 -     LM Jessy Monzon, PT 10/26/16 -     VL Kristi Everett, RN 10/28/16 -            Occupational Therapy Education     Title: PT OT SLP Therapies (Active)     Topic: Occupational Therapy (Active)     Point: ADL training (Done)    Description: Instruct learner(s) on proper safety adaptation and remediation techniques during self care or transfers.   Instruct in proper use of assistive devices.    Learning Progress Summary    Learner Readiness Method Response Comment Documented by Status   Patient Acceptance E VU Role of OT  03/03/17 1319 Done                      User Key     Initials Effective Dates Name Provider Type Discipline     10/26/16 -  [facility] Asim Occupational Therapist OT                  OT Recommendation and Plan  Anticipated Discharge Disposition: home with home health  Planned Therapy Interventions: ADL retraining, strengthening, transfer training  Therapy Frequency: 3-5 times/wk  Plan of Care Review  Plan Of Care Reviewed With: patient  Progress:  (Pt seen for OT evaluation today.)  Outcome Summary/Follow up Plan: Pt with decreased strength, endurance to activity and limited independence to self care tasks.  Pt is expected to benefit from skilled OT to improve her overal functional independence.          OT Goals       03/03/17 1320          Transfer Training OT LTG    Transfer Training OT LTG, Date Established 03/03/17  -      Transfer Training OT LTG, Time to Achieve 2 wks  -      Transfer Training OT LTG, Activity Type bed to chair /chair to bed;sit to stand/stand to sit  -       Transfer Training OT LTG, [place] Level conditional independence  -      Transfer Training OT LTG, Assist Device walker, rolling  -      Transfer Training OT LTG, Outcome goal ongoing  -      Strength OT LTG    Strength Goal OT LTG, Date Established 03/03/17  -      Strength Goal OT LTG, Measure to Achieve Pt will participate in BUE strengthening ex using theraband for resistance.  -      Strength Goal OT LTG, Outcome goal ongoing  -      LB Dressing OT LTG    LB Dressing Goal OT LTG, Date Established 03/03/17  -      LB Dressing Goal OT LTG, Time to Achieve 2 wks  -AH      LB Dressing Goal OT LTG, [place] Level set up required  -      LB Dressing Goal OT LTG, Outcome goal ongoing  -      Functional Mobility OT LTG    Functional Mobility Goal OT LTG, Date Established 03/03/17  -      Functional Mobility Goal OT LTG, Time to Achieve 2 wks  -      Functional Mobility Goal OT LTG, [place] Level standby assist  -      Functional Mobility Goal OT LTG, Assist Device rolling walker  -      Functional Mobility Goal OT LTG, Distance to Achieve in hallway  -      Functional Mobility Goal OT LTG, Outcome goal ongoing  -        User Key  (r) = Recorded By, (t) = Taken By, (c) = Cosigned By    Initials Name Provider Type     Katia Dailey Occupational Therapist                Outcome Measures       03/03/17 1015 03/03/17 1011       How much help from another person do you currently need...    Turning from your back to your side while in flat bed without using bedrails?  4  -LM     Moving from lying on back to sitting on the side of a flat bed without bedrails?  4  -LM     Moving to and from a bed to a chair (including a wheelchair)?  3  -LM     Standing up from a chair using your arms (e.g., wheelchair, bedside chair)?  3  -LM     Climbing 3-5 steps with a railing?  3  -LM     To walk in hospital room?  3  -LM     AM-PAC 6 Clicks Score  20  -LM     How much help from another is  currently needed...    Putting on and taking off regular lower body clothing? 3  -AH      Bathing (including washing, rinsing, and drying) 3  -AH      Toileting (which includes using toilet bed pan or urinal) 4  -AH      Putting on and taking off regular upper body clothing 4  -AH      Taking care of personal grooming (such as brushing teeth) 4  -AH      Eating meals 4  -      Score 22  -AH      Functional Assessment    Outcome Measure Options AM-PAC 6 Clicks Daily Activity (OT)  - AM-PAC 6 Clicks Basic Mobility (PT)  -       User Key  (r) = Recorded By, (t) = Taken By, (c) = Cosigned By    Initials Name Provider Type     Katia Dailey Occupational Therapist    JORGE Monzon, PT Physical Therapist          Time Calculation:   OT Start Time: 1015    Therapy Charges for Today     Code Description Service Date Service Provider Modifiers Qty    [id number]  OT EVAL LOW COMPLEXITY 4 3/3/2017 Katia Dailey GO 1               Katia Dailey  3/3/2017

## 2017-03-03 NOTE — PROGRESS NOTES
Acute Care - Physical Therapy Initial Evaluation   [facility]     Patient Name: Yudy Vásquez  : 1945  MRN: [id number]  Today's Date: 3/3/2017   Onset of Illness/Injury or Date of Surgery Date: 17  Date of Referral to PT: 17  Referring Physician: Amarilys      Admit Date: 3/2/2017     Visit Dx:    ICD-10-CM ICD-9-CM   1. Acute renal failure, unspecified acute renal failure type N17.9 584.9   2. Elevated liver enzymes R74.8 790.5   3. Hyperkalemia E87.5 276.7   4. Impaired functional mobility, balance, gait, and endurance Z74.09 V49.89     Patient Active Problem List   Diagnosis   • Anemia of chronic disease   • Chronic coronary artery disease   • Chronic systolic CHF (congestive heart failure), NYHA class 3   • Colon polyp   • Decreased hearing   • Chronic hypoxemic respiratory failure   • Osteopenia   • Controlled type 2 diabetes mellitus with circulatory disorder, with long-term current use of insulin   • Essential hypertension   • Psychophysiological insomnia   • Ischemic cardiomyopathy   • GERD (gastroesophageal reflux disease)   • Moderate episode of recurrent major depressive disorder   • 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 attack   • Hypothyroidism due to acquired atrophy of thyroid   • COPD, severe   • PAD (peripheral artery disease)   • Moderate to severe pulmonary hypertension   • Functional diarrhea   • Delayed gastric emptying   • RLS (restless legs syndrome)   • Low bone density   • Community acquired pneumonia   • Acute renal failure   • Chronic kidney disease (CKD) stage G3a/A1, moderately decreased glomerular filtration rate (GFR) between 45-59 mL/min/1.73 square meter and albuminuria creatinine ratio less than 30 mg/g     Past Medical History   Diagnosis Date   • Anemia    • Anxiety    • Arthritis    • Bacterial sinusitis    • Chronic back pain    • Chronic obstructive lung disease    • Colon cancer screening    • Colon polyps       · Last  Impression: 09 Dec 2015  History of multiple colon polyps including tubularadenomata.  Maximino Fields (Gastroenterology)   • Constipation    • Depression    • Diarrhea    • Disease of thyroid gland    • Dysphagia 01/20/2013   • Esophageal reflux    • Esophagitis, reflux    • Hernia    • History of bronchitis    • History of cardiac pacemaker    • History of degenerative disc disease    • History of tobacco abuse    • History of urinary tract infection    • Hyperlipidemia    • Hypothyroidism    • Myocardial infarction    • Nausea    • Occult blood in stools      Impression: 12/09/2015 - This could be multifactorial including secondary to small nasal bleed.  However the small nasal bleed does not explain anemia as such.  The patient previously had undergone a colonoscopy in February 2014.  However unfortunately the prep was considered to be suboptimal.;    • Pancreatitis      status post ERCP for common duct stone in 2001   • RLS (restless legs syndrome)      Past Surgical History   Procedure Laterality Date   • Back surgery  1992   • Colonoscopy  2009     Fiberoptic (Onset Date: 2009)   • Internal cardiac defibrillator insertion       Dual Lead Cardioverter-Defibrilator   • Coronary stent placement  1994   • Cardiac pacemaker placement     • Cardiac catheterization     • Pacemaker implantation  1994   • Cholecystectomy  1968   • Tonsillectomy  1955   • Hysterectomy  1998          PT ASSESSMENT (last 72 hours)      PT Evaluation       03/03/17 1011 03/03/17 0225    Rehab Evaluation    Document Type evaluation  -LM     Subjective Information agree to therapy;complains of;pain  -LM     Patient Effort, Rehab Treatment adequate  -LM     General Information    Patient Profile Review yes  -LM     Onset of Illness/Injury or Date of Surgery Date 03/02/17  -LM     Referring Physician Amarilys  -JORGE     General Observations Pt seated at EOB; O2 per n/c; anxious  -LM     Pertinent History Of Current Problem Anxiety  -LM      Precautions/Limitations fall precautions;oxygen therapy device and L/min  -LM     Prior Level of Function independent:;all household mobility  -LM     Equipment Currently Used at Home oxygen;shower chair;walker, rolling  -LM oxygen  -VL    Plans/Goals Discussed With patient;agreed upon  -LM     Risks Reviewed patient:;LOB;increased discomfort  -LM     Benefits Reviewed patient:;increase independence  -LM     Barriers to Rehab ineffective coping  -LM     Living Environment    Lives With alone  -LM alone  -VL    Living Arrangements apartment  -LM apartment  -VL    Home Accessibility stairs to enter home  -LM no concerns  -VL    Number of Stairs to Enter Home 2  -LM     Stair Railings at Home  none  -VL    Type of Financial/Environmental Concern  none  -VL    Transportation Available  none  -VL    Clinical Impression    Date of Referral to PT 03/03/17  -LM     PT Diagnosis Generalized weakness  -LM     Patient/Family Goals Statement Get stronger; return home.  -LM     Criteria for Skilled Therapeutic Interventions Met yes;treatment indicated  -LM     Rehab Potential good, to achieve stated therapy goals  -LM     Pain Assessment    Pain Assessment 0-10  -LM     Pain Score 6  -LM     Post Pain Score 6  -LM     Pain Type Acute pain  -LM     Pain Location Abdomen  -LM     Pain Intervention(s) Repositioned;Ambulation/increased activity  -LM     Cognitive Assessment/Intervention    Current Cognitive/Communication Assessment functional  -LM     Orientation Status oriented x 4  -LM     Follows Commands/Answers Questions 100% of the time;needs increased time  -LM     Personal Safety WNL/WFL  -LM     Personal Safety Interventions gait belt;nonskid shoes/slippers when out of bed;fall prevention program maintained  -LM     ROM (Range of Motion)    General ROM no range of motion deficits identified  -LM     MMT (Manual Muscle Testing)    General MMT Assessment no strength deficits identified  -LM     Transfer Assessment/Treatment     Transfers, Sit-Stand [place] contact guard assist;verbal cues required  -LM     Transfers, Stand-Sit [place] contact guard assist;verbal cues required  -LM     Transfers, Sit-Stand-Sit, Assist Device rolling walker  -LM     Transfer, Safety Issues balance decreased during turns  -LM     Transfer, Impairments strength decreased;impaired balance  -LM     Gait Assessment/Treatment    Gait, [place] Level contact guard assist;verbal cues required  -LM     Gait, Assistive Device rolling walker  -LM     Gait, Distance (Feet) 80  -LM     Gait, Safety Issues supplemental O2  -LM     Gait, Impairments strength decreased  -LM     Positioning and Restraints    Pre-Treatment Position in bed   Pt found sitting EOB.  -LM     Post Treatment Position bed  -LM     In Bed sitting EOB;call light within reach;encouraged to call for assist;patient within staff view;with other staff   Dr. Guzman present at bedside.  -LM       User Key  (r) = Recorded By, (t) = Taken By, (c) = Cosigned By    Initials Name Provider Type    LM Jessy Monzon, PT Physical Therapist    RADHA Everett RN Registered Nurse          Physical Therapy Education     Title: PT OT SLP Therapies (Done)     Topic: Physical Therapy (Done)     Point: Mobility training (Done)    Learning Progress Summary    Learner Readiness Method Response Comment Documented by Status   Patient Acceptance E VU Purpose of PT/POC.  Proper use of RW for safe transfers/ambulation.  03/03/17 1200 Done               Point: Home exercise program (Done)    Learning Progress Summary    Learner Readiness Method Response Comment Documented by Status   Patient Acceptance E VU Purpose of PT/POC.  Proper use of RW for safe transfers/ambulation.  03/03/17 1200 Done                      User Key     Initials Effective Dates Name Provider Type Discipline     10/26/16 -  Jessy Monzon, PT Physical Therapist PT                PT Recommendation and Plan  Anticipated Discharge  Disposition: home with home health  Planned Therapy Interventions: balance training, bed mobility training, gait training, home exercise program, patient/family education, strengthening, transfer training  PT Frequency: daily  Plan of Care Review  Plan Of Care Reviewed With: patient  Progress: progress toward functional goals as expected  Outcome Summary/Follow up Plan: PT shanae completed.  Patient found sitting EOB.  She is able to perform all mobility, including ambulating 80 feet with RW, with 3 LPM O2 and CGA.  She is expected to participate well with, and benefit from, skilled PT intervention.          IP PT Goals       03/03/17 1201          Transfer Training PT LTG    Transfer Training PT LTG, Date Established 03/03/17  -LM      Transfer Training PT LTG, Time to Achieve 2 wks  -LM      Transfer Training PT LTG, Activity Type bed to chair /chair to bed;sit to stand/stand to sit  -LM      Transfer Training PT LTG, [place] Level conditional independence  -LM      Transfer Training PT LTG, Assist Device walker, rolling  -LM      Transfer Training PT LTG, Outcome goal ongoing  -LM      Gait Training PT LTG    Gait Training Goal PT LTG, Date Established 03/03/17  -LM      Gait Training Goal PT LTG, Time to Achieve 2 wks  -LM      Gait Training Goal PT LTG, [place] Level conditional independence  -LM      Gait Training Goal PT LTG, Assist Device walker, rolling  -LM      Gait Training Goal PT LTG, Distance to Achieve 200  -LM      Gait Training Goal PT LTG, Outcome goal ongoing  -LM      Strength Goal PT LTG    Strength Goal PT LTG, Date Established 03/03/17  -LM      Strength Goal PT LTG, Time to Achieve 2 wks  -LM      Strength Goal PT LTG, Measure to Achieve Patient will perform B LE ther ex x 15 reps to improve functional strength for mobility.  -LM      Strength Goal PT LTG, Outcome goal ongoing  -LM        User Key  (r) = Recorded By, (t) = Taken By, (c) = Cosigned By    Initials Name Provider Type     LM Jessy Monzon, PT Physical Therapist                Outcome Measures       03/03/17 1011          How much help from another person do you currently need...    Turning from your back to your side while in flat bed without using bedrails? 4  -LM      Moving from lying on back to sitting on the side of a flat bed without bedrails? 4  -LM      Moving to and from a bed to a chair (including a wheelchair)? 3  -LM      Standing up from a chair using your arms (e.g., wheelchair, bedside chair)? 3  -LM      Climbing 3-5 steps with a railing? 3  -LM      To walk in hospital room? 3  -LM      AM-PAC 6 Clicks Score 20  -LM      Functional Assessment    Outcome Measure Options AM-PAC 6 Clicks Basic Mobility (PT)  -LM        User Key  (r) = Recorded By, (t) = Taken By, (c) = Cosigned By    Initials Name Provider Type    JORGE Monzon, PT Physical Therapist           Time Calculation:         PT Charges       03/03/17 1204          Time Calculation    Start Time 1011  -LM      PT Received On 03/03/17  -LM      PT Goal Re-Cert Due Date 03/13/17  -LM        User Key  (r) = Recorded By, (t) = Taken By, (c) = Cosigned By    Initials Name Provider Type    JORGE Monzon PT Physical Therapist          Therapy Charges for Today     Code Description Service Date Service Provider Modifiers Qty    10470878648 HC PT EVAL LOW COMPLEXITY 4 3/3/2017 Jessy Monzon, PT GP 1          PT G-Codes  Outcome Measure Options: AM-PAC 6 Clicks Basic Mobility (PT)      Jessy Monzon PT  3/3/2017

## 2017-03-03 NOTE — PLAN OF CARE
Problem: Patient Care Overview (Adult)  Goal: Plan of Care Review  Outcome: Ongoing (interventions implemented as appropriate)    Problem: Renal Failure/Kidney Injury, Acute (Adult)  Goal: Signs and Symptoms of Listed Potential Problems Will be Absent or Manageable (Renal Failure/Kidney Injury, Acute)  Outcome: Ongoing (interventions implemented as appropriate)    Problem: Fall Risk (Adult)  Goal: Identify Related Risk Factors and Signs and Symptoms  Outcome: Ongoing (interventions implemented as appropriate)  Goal: Absence of Falls  Outcome: Ongoing (interventions implemented as appropriate)

## 2017-03-03 NOTE — PROGRESS NOTES
[facility]    Name:  Yudy Vásquez   Age:  72 y.o.  Sex:  female  :  1945  MRN:  [id number]   Visit Number:  [id number]  Admission Date:  3/2/2017  Date Of Service:  17  Primary Care Physician:  Phuong Vera MD     LOS: 0 days :      Chief Complaint:  Itching and panic        Subjective / Interval History: Patient was admitted last night with Xanax withdrawal since she has been taking more than prescribed amount of Xanax at home.  She was just now up walking with therapy.  The patient is very nervous and has been itching her arms and her stomach.  She is very anxious about having liver disease.  She did admit to taking some NSAIDs at home as well as her Lasix, Aldactone, Cozaar.  Her potassium is improved.  She denies increased cough, chest pain, shortness of breath, nausea, vomiting, current abdominal pain.  She was initially admitted with some abdominal pain which appears resolved at this time and her constipation appears resolved at this time as well.  No acute events have occurred overnight.      Review of Systems:     General ROS: Patient denies any fevers, chills or loss of consciousness.  Ophthalmic ROS: Denies any diplopia or transient loss of vision.  ENT ROS: Denies sore throat, nasal congestion or ear pain.   Respiratory ROS: Denies cough or shortness of breath.  Cardiovascular ROS: Denies chest pain or palpitations. No history of exertional chest pain.   Gastrointestinal ROS: Denies nausea and vomiting. Denies any abdominal pain. No diarrhea. Complains of abdominal size larger.  Genito-Urinary ROS: Denies dysuria or hematuria.  Musculoskeletal ROS: Denies back pain. No muscle pain. No calf pain.  Neurological ROS: Denies any focal weakness. No loss of consciousness. Denies any numbness. Denies neck pain.   Dermatological ROS: Denies any redness or pruritis. Itching all over from nervousness  Psych: anxious with panic    Vital  Signs:    Temp:  [97.2 °F (36.2 °C)-98.3 °F (36.8 °C)] 97.2 °F (36.2 °C)  Heart Rate:  [60-89] 61  Resp:  [16-19] 19  BP: (102-144)/(41-75) 119/65    Intake and output:    I/O last 3 completed shifts:  In: 1000 [I.V.:1000]  Out: -   I/O this shift:  In: 1240 [P.O.:240; I.V.:1000]  Out: -     Physical Examination:    General Appearance:    Alert and cooperative, not in any acute distress. Smiling intermittently but mildly parnoid   Head:    Atraumatic and normocephalic, without obvious abnormality.   Eyes:            PERRLA, conjunctivae and sclerae normal, no Icterus. No pallor. Extra-occular movements are within normal limits.   Throat:   No oral lesions, no thrush, oral mucosa moist.   Neck:   Supple, trachea midline, no thyromegaly, no carotid bruit.   Lungs:     Chest shape is normal. Breath sounds heard bilaterally reduced equally.  No crackles or wheezing. No Pleural rub or bronchial breathing.    Heart:    Normal S1 and S2, no murmur, no gallop, no rub. No JVD   Abdomen:     Normal bowel sounds, no masses, no organomegaly. Soft        non-tender, non-distended, no guarding, no rebound                Tenderness, mild ascites   Extremities:   Moves all extremities well, no edema, no cyanosis, no             clubbing   Skin:   No bleeding, bruising or rash. Neurotic dermatoses appearing lesions on arms and stomach.   Neurologic:   Cranial nerves 2 - 12 grossly intact, sensation intact, Motor power is normal and equal bilaterally.   Laboratory results:    Lab Results (last 24 hours)     Procedure Component Value Units Date/Time    CBC & Differential [[id number]] Collected:  03/02/17 2309    Specimen:  Blood Updated:  03/02/17 2339    Narrative:       The following orders were created for panel order CBC & Differential.  Procedure                               Abnormality         Status                     ---------                               -----------         ------                     CBC Auto  Differential[[id number]]         Abnormal            Final result                 Please view results for these tests on the individual orders.    CBC Auto Differential [[id number]]  (Abnormal) Collected:  03/02/17 2309    Specimen:  Blood Updated:  03/02/17 2339     WBC 7.45 10*3/mm3      RBC 3.53 (L) 10*6/mm3      Hemoglobin 10.2 (L) g/dL      Hematocrit 31.8 (L) %      MCV 90.1 fL      MCH 28.9 pg      MCHC 32.1 g/dL      RDW 17.8 (H) %      RDW-SD 58.5 (H) fl      MPV 10.8 fL      Platelets 221 10*3/mm3      Neutrophil % 91.5 (H) %      Lymphocyte % 2.6 (L) %      Monocyte % 5.5 %      Eosinophil % 0.0 %      Basophil % 0.1 %      Immature Grans % 0.3 %      Neutrophils, Absolute 6.82 10*3/mm3      Lymphocytes, Absolute 0.19 (L) 10*3/mm3      Monocytes, Absolute 0.41 10*3/mm3      Eosinophils, Absolute 0.00 10*3/mm3      Basophils, Absolute 0.01 10*3/mm3      Immature Grans, Absolute 0.02 10*3/mm3      nRBC 0.0 /100 WBC     Protime-INR [[id number]]  (Abnormal) Collected:  03/02/17 2309    Specimen:  Blood Updated:  03/02/17 2343     Protime 20.1 (H) Seconds      INR 1.83 (H)     Comprehensive Metabolic Panel [[id number]]  (Abnormal) Collected:  03/02/17 2309    Specimen:  Blood Updated:  03/03/17 0100     Glucose 159 (H) mg/dL      BUN 73 (H) mg/dL      Creatinine 3.20 (H) mg/dL      Sodium 130 (L) mmol/L      Potassium 6.1 (C) mmol/L      Chloride 92 (L) mmol/L      CO2 27.0 mmol/L      Calcium 9.1 mg/dL      Total Protein 6.9 g/dL      Albumin 4.10 g/dL      ALT (SGPT) 914 (C) U/L      AST (SGOT) 730 (C) U/L      Alkaline Phosphatase 166 (H) U/L      Total Bilirubin 1.4 (H) mg/dL      eGFR Non African Amer 14 (L) mL/min/1.73      Globulin 2.8 gm/dL      A/G Ratio 1.5 g/dL      BUN/Creatinine Ratio 22.8      Anion Gap 17.1 mmol/L     Narrative:       The MDRD GFR formula is only valid for adults with stable renal function between ages 18 and 70.    CBC Auto Differential [[id number]]  (Abnormal) Collected:  03/03/17 0518     Specimen:  Blood Updated:  03/03/17 0547     WBC 7.93 10*3/mm3      RBC 3.43 (L) 10*6/mm3      Hemoglobin 9.9 (L) g/dL      Hematocrit 31.3 (L) %      MCV 91.3 fL      MCH 28.9 pg      MCHC 31.6 g/dL      RDW 17.7 (H) %      RDW-SD 59.5 (H) fl      MPV 11.5 fL      Platelets 244 10*3/mm3      Neutrophil % 82.3 (H) %      Lymphocyte % 5.3 (L) %      Monocyte % 11.9 %      Eosinophil % 0.0 %      Basophil % 0.1 %      Immature Grans % 0.4 %      Neutrophils, Absolute 6.53 10*3/mm3      Lymphocytes, Absolute 0.42 (L) 10*3/mm3      Monocytes, Absolute 0.94 (H) 10*3/mm3      Eosinophils, Absolute 0.00 10*3/mm3      Basophils, Absolute 0.01 10*3/mm3      Immature Grans, Absolute 0.03 10*3/mm3      nRBC 0.0 /100 WBC     POC Glucose Fingerstick [[id number]]  (Normal) Collected:  03/03/17 0606    Specimen:  Blood Updated:  03/03/17 0620     Glucose 115 mg/dL       Serial Number: [id number]    : [id number]       Ammonia [[id number]]  (Normal) Collected:  03/03/17 0606    Specimen:  Blood Updated:  03/03/17 0703     Ammonia 17 umol/L     Lipid Panel [[id number]]  (Abnormal) Collected:  03/03/17 0606    Specimen:  Blood Updated:  03/03/17 0717     Total Cholesterol 112 mg/dL      Triglycerides 75 mg/dL      HDL Cholesterol 38 (L) mg/dL      LDL Cholesterol  59 mg/dL      VLDL Cholesterol 15 mg/dL      LDL/HDL Ratio 1.55     Narrative:       Reference ranges for triglycerides, VLDL cholesterol, LDL cholesterol, and HDL cholesterol are not true population normal ranges but are threshold levels for increased risk of coronary artery disease established by ATP III guidelines from the National Cholesterol Education Program.    Ferritin [[id number]]  (Normal) Collected:  03/03/17 0607    Specimen:  Blood Updated:  03/03/17 0750     Ferritin 78.60 ng/mL     TSH [[id number]]  (Normal) Collected:  03/03/17 0607    Specimen:  Blood Updated:  03/03/17 0750     TSH 1.370 mIU/mL     Iron Profile [[id number]]  (Abnormal) Collected:  03/03/17 0607     Specimen:  Blood Updated:  03/03/17 0750     Iron 33 (L) mcg/dL      TIBC 423 mcg/dL      Iron Saturation 8 (L) %     Lipase [[id number]]  (Normal) Collected:  03/03/17 0606    Specimen:  Blood Updated:  03/03/17 0754     Lipase 153 U/L     Magnesium [[id number]]  (Normal) Collected:  03/03/17 0606    Specimen:  Blood Updated:  03/03/17 0754     Magnesium 1.8 mg/dL     Comprehensive Metabolic Panel [[id number]]  (Abnormal) Collected:  03/03/17 0606    Specimen:  Blood Updated:  03/03/17 0809     Glucose 96 mg/dL      BUN 71 (H) mg/dL      Creatinine 3.10 (H) mg/dL      Sodium 131 (L) mmol/L      Potassium 5.3 (H) mmol/L      Chloride 93 (L) mmol/L      CO2 28.0 mmol/L      Calcium 9.3 mg/dL      Total Protein 6.9 g/dL      Albumin 4.10 g/dL      ALT (SGPT) 904 (C) U/L      AST (SGOT) 721 (C) U/L      Alkaline Phosphatase 154 (H) U/L      Total Bilirubin 1.3 mg/dL      eGFR Non African Amer 15 (L) mL/min/1.73      Globulin 2.8 gm/dL      A/G Ratio 1.5 g/dL      BUN/Creatinine Ratio 22.9      Anion Gap 15.3 mmol/L     Narrative:       The MDRD GFR formula is only valid for adults with stable renal function between ages 18 and 70.    Reticulocytes [[id number]]  (Abnormal) Collected:  03/03/17 0514    Specimen:  Blood Updated:  03/03/17 1038     Reticulocyte % 4.21 (H) %      Reticulocyte Absolute 0.1436 (H) 10*6/mm3     POC Glucose Fingerstick [[id number]]  (Normal) Collected:  03/03/17 1100    Specimen:  Blood Updated:  03/03/17 1111     Glucose 124 mg/dL       Serial Number: [id number]    : [id number]       Hepatitis Panel, Acute [[id number]] Collected:  03/03/17 1206    Specimen:  Blood Updated:  03/03/17 1223    Haptoglobin [[id number]] Collected:  03/03/17 1207    Specimen:  Blood Updated:  03/03/17 1223    Folate RBC [[id number]] Collected:  03/03/17 1207    Specimen:  Blood Updated:  03/03/17 1223    Potassium [[id number]]  (Normal) Collected:  03/03/17 1207    Specimen:  Blood Updated:  03/03/17 1247     Potassium 4.8  mmol/L     Vitamin B12 [[id number]]  (Abnormal) Collected:  03/03/17 0607    Specimen:  Blood Updated:  03/03/17 1319     Vitamin B-12 >1000 (H) pg/mL     Urinalysis With / Microscopic If Indicated [[id number]]  (Abnormal) Collected:  03/03/17 1316    Specimen:  Urine from Urine, Clean Catch Updated:  03/03/17 1342     Color, UA Yellow      Appearance, UA Clear      pH, UA <=5.0      Specific Gravity, UA <=1.005      Glucose, UA Negative      Ketones, UA Negative      Bilirubin, UA Negative      Blood, UA Trace (A)      Protein, UA Trace (A)      Leuk Esterase, UA Negative      Nitrite, UA Negative      Urobilinogen, UA 2.0 E.U./dL (A)     Sodium, Urine, Random [[id number]]  (Abnormal) Collected:  03/03/17 1316    Specimen:  Urine from Urine, Clean Catch Updated:  03/03/17 1356     Sodium, Urine 19 (L) mmol/L     Urinalysis, Microscopic Only [[id number]]  (Abnormal) Collected:  03/03/17 1316    Specimen:  Urine from Urine, Clean Catch Updated:  03/03/17 1357     RBC, UA 0-2 (A) /HPF      WBC, UA 3-5 (A) /HPF      Bacteria, UA 2+ (A) /HPF      Squamous Epithelial Cells, UA 3-6 (A) /HPF      Hyaline Casts, UA None Seen /LPF      Methodology Manual Light Microscopy     Occult Blood X 1, Stool [[id number]]  (Normal) Collected:  03/03/17 1316    Specimen:  Stool Updated:  03/03/17 1359     Fecal Occult Blood Negative           I have reviewed the patient's laboratory results.    Radiology results:    Imaging Results (last 24 hours)     Procedure Component Value Units Date/Time    US Liver [[id number]] Collected:  03/03/17 0921     Updated:  03/03/17 0923    Narrative:       PROCEDURE: US LIVER-     HISTORY: elevated lfts; N17.9-Acute kidney failure, unspecified;  R74.8-Abnormal levels of other serum enzymes; E87.5-Hyperkalemia     PROCEDURE: Ultrasound images of the right upper quadrant were obtained.     FINDINGS: Limited images of the pancreas are unremarkable. The liver  parenchyma is normal in echogenicity. The gallbladder is  surgically  absent.  There is no ascites.  The common duct measures 5.6 mm      .  Limited images of the right kidney are unremarkable.       Impression:       Unremarkable right upper quadrant ultrasound.     This report was finalized on 3/3/2017 9:21 AM by Mercedes Zhou M.D..    US Venous Doppler Lower Extremity Left (duplex) [[id number]] Collected:  03/03/17 1553     Updated:  03/03/17 1556    Narrative:       PROCEDURE: US VENOUS DOPPLER LOWER EXTREMITY LEFT (DUPLEX)-     HISTORY: edema left leg; N17.9-Acute kidney failure, unspecified;  R74.8-Abnormal levels of other serum enzymes; E87.5-Hyperkalemia;  Z74.09-Other reduced mobility; Z74.09-Other reduced mobility     PROCEDURE: Multiple transverse and longitudinal scans were performed of  the femoropopliteal deep venous system, with augmentation and  compression maneuvers.     FINDINGS: Normal phasic flow was noted in the visualized deep venous  system. No intraluminal increased echogenicity is noted to suggest  thrombus. There is normal compression and augmentation of the venous  structures. No abnormal venous collaterals are seen. No venous reflux is  demonstrated.       Impression:       No evidence of lower extremity DVT.     This report was finalized on 3/3/2017 3:54 PM by Mercedes Zhou M.D..          I have reviewed the patient's radiology reports.    Medication Review:     I have reviewed the patients active and prn medications.     Assessment:    Principal Problem:    Acute renal failure  Active Problems:    Anemia of chronic disease    Chronic coronary artery disease    Chronic systolic CHF (congestive heart failure), NYHA class 3    Chronic hypoxemic respiratory failure    Controlled type 2 diabetes mellitus with circulatory disorder, with long-term current use of insulin    Essential hypertension    Ischemic cardiomyopathy    GERD (gastroesophageal reflux disease)    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 attack     Functional diarrhea    Delayed gastric emptying    Chronic kidney disease (CKD) stage G3a/A1, moderately decreased glomerular filtration rate (GFR) between 45-59 mL/min/1.73 square meter and albuminuria creatinine ratio less than 30 mg/g          Plan:  With chronic systolic CHF, will lower iv fluid rate slightly to 100 cc and continue monitoring closely on telemetry. Continue to hold nsaids, lasix, cozaar, aldactone. Monitor anemia, which is chronic. Continue levemir and sliding scale insulin. Blood pressure is stable currently without these medications. Continue to monitor and titrate medications accordingly. Dr Adler has seen the patient. Dr Fields will also consult and await further recommendations.   PT/OT has had her up walking as well. Vistaril prn itching. Xanax increased with acute on chronic panic. The patient has been taking 4 tab daily at home, though prescribed 3 per day. Medication risks and benefits discussed. JIMI reviewed in her chart. Check daily labs. Expect 2-3 more days may be needed.            Mariya Guzman DO  03/03/17  4:29 PM

## 2017-03-03 NOTE — ED NOTES
0128: DR. DAWKINS PAGED PER DR. BEY.  0129: JUANCHO RETURNED PAGE, CALL SENT TO DR. BEY @ THIS TIME.     Thelma Gupta  03/03/17 0130

## 2017-03-03 NOTE — ED PROVIDER NOTES
"Subjective   HPI Comments: 73 yo F presenting with anxiety and rash. She states she has been without her xanax for three days (due to running out early), all day today she has felt very anxious, tonight \"I just couldn't stand it and I thought I was going to get confused\" so she called ems. She also describes all over itching and worsening bruising to bilateral forearms. She denies any chest pain, sob, nausea, vomiting, diarrhea, abdominal pain, fevers, chills. Of note she was seen in the ED three days ago, by this provider, she was diagnosed with constipation and elevated liver enzymes.      Review of Systems   Constitutional: Negative for chills and fever.   HENT: Negative for congestion, rhinorrhea and sore throat.    Eyes: Negative for pain.   Respiratory: Negative for cough and shortness of breath.    Cardiovascular: Negative for chest pain, palpitations and leg swelling.   Gastrointestinal: Negative for abdominal pain, diarrhea, nausea and vomiting.   Genitourinary: Negative for dysuria.   Musculoskeletal: Negative for arthralgias.   Skin: Positive for rash.   Neurological: Negative for weakness and numbness.   Psychiatric/Behavioral: Negative for behavioral problems. The patient is nervous/anxious.        Past Medical History   Diagnosis Date   • Anemia    • Anxiety    • Arthritis    • Bacterial sinusitis    • Chronic back pain    • Chronic obstructive lung disease    • Colon cancer screening    • Colon polyps       · Last Impression: 09 Dec 2015  History of multiple colon polyps including tubularadenomata.  Maximino Fields (Gastroenterology)   • Constipation    • Depression    • Diarrhea    • Disease of thyroid gland    • Dysphagia 01/20/2013   • Esophageal reflux    • Esophagitis, reflux    • Hernia    • History of bronchitis    • History of cardiac pacemaker    • History of degenerative disc disease    • History of tobacco abuse    • History of urinary tract infection    • Hyperlipidemia    • Hypothyroidism  "   • Myocardial infarction    • Nausea    • Occult blood in stools      Impression: 12/09/2015 - This could be multifactorial including secondary to small nasal bleed.  However the small nasal bleed does not explain anemia as such.  The patient previously had undergone a colonoscopy in February 2014.  However unfortunately the prep was considered to be suboptimal.;    • Pancreatitis      status post ERCP for common duct stone in 2001   • RLS (restless legs syndrome)        Allergies   Allergen Reactions   • Amphetamine-Dextroamphetamine    • Lorazepam    • Other      No Known Drug Allergy  No Known Food Allergy       Past Surgical History   Procedure Laterality Date   • Back surgery  1992   • Colonoscopy  2009     Fiberoptic (Onset Date: 2009)   • Internal cardiac defibrillator insertion       Dual Lead Cardioverter-Defibrilator   • Coronary stent placement  1994   • Cardiac pacemaker placement     • Cardiac catheterization     • Pacemaker implantation  1994   • Cholecystectomy  1968   • Tonsillectomy  1955   • Hysterectomy  1998       Family History   Problem Relation Age of Onset   • Dementia Mother    • Pneumonia Mother    • COPD Father    • Emphysema Father    • Lung cancer Sister    • Cancer Sister    • COPD Sister        Social History     Social History   • Marital status:      Spouse name: N/A   • Number of children: N/A   • Years of education: N/A     Social History Main Topics   • Smoking status: Former Smoker   • Smokeless tobacco: Never Used   • Alcohol use No   • Drug use: No   • Sexual activity: No     Other Topics Concern   • None     Social History Narrative           Objective   Physical Exam   Constitutional: She is oriented to person, place, and time. No distress.   Chronically ill appearing   HENT:   Head: Normocephalic and atraumatic.   Right Ear: External ear normal.   Left Ear: External ear normal.   Nose: Nose normal.   Mouth/Throat: Oropharynx is clear and moist.   Eyes: Conjunctivae  and EOM are normal. Pupils are equal, round, and reactive to light.   Neck: Normal range of motion. Neck supple.   Cardiovascular: Normal rate, regular rhythm, normal heart sounds and intact distal pulses.    Pulmonary/Chest: Effort normal and breath sounds normal. No respiratory distress.   Abdominal: Soft. Bowel sounds are normal. She exhibits no distension. There is no tenderness. There is no rebound and no guarding.   Musculoskeletal: Normal range of motion. She exhibits no edema, tenderness or deformity.   Neurological: She is alert and oriented to person, place, and time.   Normal strength/sensation bilateral UE and LE, normal gait   Skin: Skin is warm and dry.   Purpura to bilateral dorsal forearms    Psychiatric: She has a normal mood and affect. Her behavior is normal.   Nursing note and vitals reviewed.      Procedures         ED Course  ED Course                  MDM  Number of Diagnoses or Management Options  Acute renal failure, unspecified acute renal failure type:   Elevated liver enzymes:   Hyperkalemia:   Diagnosis management comments: 73 yo F with anxiety, bruising. Chronically ill appearing F in no distress though anxious with exam as above that is otherwise as above. Given she has been without xanax for a few days that could be the etiology of her symptoms. Though she did have elevated liver enzymes which could have worsened in the interm so will recheck labs. Will treat symptomatically. Disposition pending work up.  -labs  -ekg  -po meds    Ddx: anxiety, withdrawal, lyte abnormality, abnormal LFTs    Ekg: Sinus rhythm, LAFB, normal intervals, no ST changes, unchanged from previous    Labs notable for slightly worse liver function.  More concerning is her marked elevation in her creatinine compared to 3 days ago.  Also notable is her potassium is 6.1.  I've ordered a liter of fluids and 1 g of calcium.  We'll do no further interventions to shift this potassium.  Discussed with hospitalist for  admission.       Amount and/or Complexity of Data Reviewed  Decide to obtain previous medical records or to obtain history from someone other than the patient: yes        Final diagnoses:   Acute renal failure, unspecified acute renal failure type   Elevated liver enzymes   Hyperkalemia            Anderson Campoverde MD  03/03/17 013

## 2017-03-03 NOTE — PLAN OF CARE
Problem: Patient Care Overview (Adult)  Goal: Plan of Care Review  Outcome: Ongoing (interventions implemented as appropriate)    03/03/17 1320   Coping/Psychosocial Response Interventions   Plan Of Care Reviewed With patient   Patient Care Overview   Progress (Pt seen for OT evaluation today.)   Outcome Evaluation   Outcome Summary/Follow up Plan Pt with decreased strength, endurance to activity and limited independence to self care tasks. Pt is expected to benefit from skilled OT to improve her overal functional independence.         Problem: Inpatient Occupational Therapy  Goal: Transfer Training Goal 1 LTG- OT  Outcome: Ongoing (interventions implemented as appropriate)    03/03/17 1320   Transfer Training OT LTG   Transfer Training OT LTG, Date Established 03/03/17   Transfer Training OT LTG, Time to Achieve 2 wks   Transfer Training OT LTG, Activity Type bed to chair /chair to bed;sit to stand/stand to sit   Transfer Training OT LTG, [place] Level conditional independence   Transfer Training OT LTG, Assist Device walker, rolling   Transfer Training OT LTG, Outcome goal ongoing       Goal: Strength Goal LTG- OT  Outcome: Ongoing (interventions implemented as appropriate)    03/03/17 1320   Strength OT LTG   Strength Goal OT LTG, Date Established 03/03/17   Strength Goal OT LTG, Measure to Achieve Pt will participate in BUE strengthening ex using theraband for resistance.   Strength Goal OT LTG, Outcome goal ongoing       Goal: LB Dressing Goal LTG- OT  Outcome: Ongoing (interventions implemented as appropriate)    03/03/17 1320   LB Dressing OT LTG   LB Dressing Goal OT LTG, Date Established 03/03/17   LB Dressing Goal OT LTG, Time to Achieve 2 wks   LB Dressing Goal OT LTG, [place] Level set up required   LB Dressing Goal OT LTG, Outcome goal ongoing       Goal: Functional Mobility Goal LTG- OT  Outcome: Ongoing (interventions implemented as appropriate)    03/03/17 1320   Functional Mobility OT LTG    Functional Mobility Goal OT LTG, Date Established 03/03/17   Functional Mobility Goal OT LTG, Time to Achieve 2 wks   Functional Mobility Goal OT LTG, [place] Level standby assist   Functional Mobility Goal OT LTG, Assist Device rolling walker   Functional Mobility Goal OT LTG, Distance to Achieve in hallway   Functional Mobility Goal OT LTG, Outcome goal ongoing

## 2017-03-03 NOTE — PROGRESS NOTES
Continued Stay Note  CLAUDIA Fofana     Patient Name: Yudy Vásquez  MRN: [id number]  Today's Date: 3/3/2017    Admit Date: 3/2/2017          Discharge Plan       03/03/17 1637    Case Management/Social Work Plan    Plan Plans to return home at discharge at this time              Discharge Codes     None        Expected Discharge Date and Time     Expected Discharge Date Expected Discharge Time    Mar 6, 2017             Ann Nevarez

## 2017-03-03 NOTE — NURSING NOTE
SWAT[facility] Teaching for renal failure/ hyperkalemia instructed on what they are, causes, and signs and symptoms  Pt verbalizes understanding but will need reinforcement

## 2017-03-03 NOTE — H&P
[facility]   HISTORY AND PHYSICAL      Name:  Yudy Vásquez   Age:  72 y.o.  Sex:  female  :  1945  MRN:  [id number]   Visit Number:  [id number]  Admission Date:  3/2/2017  Date Of Service:  17  Primary Care Physician:  Phuong Vera MD    History Obtained From:    patient    Chief Complaint:     Anxiety     History Of Presenting Illness:      Patient is a 72-year-old  female with hypertension and diabetes type 2 hypothyroidism, restless leg syndrome, chronic anxiety, ischemic cardiomyopathy with ejection fraction 25%, coronary artery disease, COPD on home O2, and peripheral arterial disease who comes in as she was out of Xanax and felt she was suffering from withdrawals.  She was here 3 days ago with abdominal pain, a CT abdomen and pelvis revealed constipation.  She also had elevated liver enzymes at that time.  Since then she has not really had a bowel movement, she also has chills, and continues to have belly pain which is diffuse.  She does see Dr. Fields, who has done a colonoscopy on her in the past.  She was somewhat a poor historian as she is not consistent with her story.  She has had some leg edema.  And claims to have had some dysphagia.  In the emergency room her creatinine was noted to be 3.20, which was 1.20 on 2017.  Her AST was 464, and now is 730.  Her ALT was 603 and is now 914.  In the past, her enzymes have not been elevated to this level.  Her INR is also 1.83.  Potassium in the emergency room was 6.1 she was given some calcium gluconate.  She is on multiple medications that could raise her creatinine including Lasix, Aldactone, Cozaar.  She is also on Lipitor.  She does not have a history of liver disease.  She has had her gallbladder removed.  She claims to have never had hepatitis.  She is very anxious, and intermittently taking her medications, as she is afraid of side effects.  She does follow with a psychologist as an  outpatient.  Her main concern is that her anxiety is relieved.  She is admitted for acute renal failure, and elevated liver enzymes.  She is also very pruritic, she states this is just in the last few days.    Review Of Systems:     General ROS: positive for  - fatigue and sleep disturbance  Psychological ROS: positive for - anxiety and concentration difficulties  Ophthalmic ROS: negative  ENT ROS: negative  Allergy and Immunology ROS: negative  Hematological and Lymphatic ROS: negative  Endocrine ROS: negative  Breast ROS: negative  Respiratory ROS: positive for - shortness of breath  Cardiovascular ROS: negative  Gastrointestinal ROS: positive for - abdominal pain and constipation  Genito-Urinary ROS: negative  Musculoskeletal ROS: positive for - muscular weakness  Neurological ROS: negative  Dermatological ROS: negative       Past Medical History:    Hypertension, non-insulin-dependent diabetes, pancreatitis, hypothyroidism, restless leg syndrome, colon polyps, ischemic cardiomyopathy with ejection fraction of 25%, coronary artery disease, COPD on home O2, MI, peripheral arterial disease, chronic anxiety    Past Surgical history:    AICD in , coronary artery stent in , ERCP, colonoscopy with Dr. Fielsd, cholecystectomy, hysterectomy, back surgery      Social History:    Quit smoking in , does not drink alcohol or use drugs, lives alone, still drives.  Has 3 sons and a daughter    Family History:    Father  of COPD mother  of dementia    Allergies:      Amphetamine-dextroamphetamine; Lorazepam; and Other    Home Medications:    Prior to Admission Medications     Prescriptions Last Dose Informant Patient Reported? Taking?    ALPRAZolam (XANAX) 0.5 MG tablet 2017  No No    Take 1 tablet by mouth 3 (Three) Times a Day As Needed (panic attack).    amoxicillin-clavulanate (AUGMENTIN) 500-125 MG per tablet   No No    Take 1 tablet by mouth 3 (Three) Times a Day.    atorvastatin (LIPITOR) 80 MG  tablet 3/1/2017  No No    Take 1 tablet by mouth Every Night.    budesonide-formoterol (SYMBICORT) 160-4.5 MCG/ACT inhaler 3/1/2017  No No    Inhale 2 puffs 2 (Two) Times a Day. Rinse mouth with water after use.    buPROPion XL (WELLBUTRIN XL) 150 MG 24 hr tablet 3/1/2017  No No    Take 1 tablet by mouth Every Morning.    clopidogrel (PLAVIX) 75 MG tablet 3/1/2017  No No    TAKE ONE TABLET BY MOUTH DAILY    diphenoxylate-atropine (LOMOTIL) 2.5-0.025 MG per tablet 3/1/2017  No No    TAKE 2-3 TABS PO PRN DIARRHEA.    escitalopram (LEXAPRO) 10 MG tablet 3/1/2017  No No    Take 1 tablet by mouth Daily.    furosemide (LASIX) 40 MG tablet 3/1/2017  No No    Take 1 tablet by mouth Daily As Needed (swelling). If needed    insulin glargine (LANTUS) 100 UNIT/ML injection 3/1/2017  No No    Inject 22 Units under the skin Every Night.    ipratropium-albuterol (COMBIVENT RESPIMAT)  MCG/ACT inhaler 3/1/2017  No No    Inhale 1 puff 4 (Four) Times a Day As Needed for shortness of air.    levothyroxine (SYNTHROID, LEVOTHROID) 125 MCG tablet 3/1/2017  No No    TAKE ONE TABLET BY MOUTH DAILY    lidocaine (LIDODERM) 5 % 3/1/2017  No No    Place 1 patch on the skin every day.    losartan (COZAAR) 50 MG tablet 3/1/2017  No No    Take 1 tablet by mouth 2 (Two) Times a Day.    melatonin 3 MG tablet 3/1/2017  No No    Take 1 tablet by mouth At Night As Needed for sleep.    metFORMIN XR (GLUCOPHAGE-XR) 750 MG 24 hr tablet 3/1/2017  No No    Take 1 tablet by mouth Daily With Breakfast.    metoclopramide (REGLAN) 5 MG tablet 3/1/2017  No No    Take 0.5 tablets by mouth 2 (Two) Times a Day.    metoprolol tartrate (LOPRESSOR) 25 MG tablet 3/1/2017  No No    Take 1 tablet by mouth 2 (Two) Times a Day.    mirtazapine (REMERON) 45 MG tablet 3/1/2017  No No    Take 1 tablet by mouth Every Night.    pantoprazole (PROTONIX) 40 MG EC tablet 3/1/2017  No No    Take 1 tablet by mouth 2 (Two) Times a Day.    polyethylene glycol (MIRALAX) packet  3/1/2017  No No    Take 17 g by mouth Daily As Needed (constipation).    promethazine (PHENERGAN) 12.5 MG tablet 3/1/2017  No No    Take 2 tablets by mouth Every 6 (Six) Hours As Needed for nausea or vomiting.    ranitidine (ZANTAC) 150 MG capsule 3/1/2017  No No    Take 1 capsule by mouth 2 (Two) Times a Day As Needed for indigestion or heartburn.    rOPINIRole (REQUIP) 1 MG tablet 3/1/2017  No No    Take 1 tablet by mouth Every Night. Take 1 hour before bedtime.    spironolactone (ALDACTONE) 25 MG tablet 3/1/2017  No No    TAKE ONE TABLET BY MOUTH DAILY    tiotropium (SPIRIVA HANDIHALER) 18 MCG per inhalation capsule 3/1/2017  No No    Place 2 puffs into inhaler and inhale Daily.             Hospital Scheduled Meds:          No current facility-administered medications for this encounter.      Vital Signs:    Temp:  [98.3 °F (36.8 °C)] 98.3 °F (36.8 °C)  Heart Rate:  [75-89] 78  Resp:  [16] 16  BP: (102-123)/(41-75) 123/75    Last 3 weights    03/02/17  2235   Weight: 175 lb (79.4 kg)       Body mass index is 29.12 kg/(m^2).    Physical Exam:      General Appearance:    Alert, but somewhat confused at times.     Head:    Normocephalic, without obvious abnormality, atraumatic   Eyes:            Lids and lashes normal, conjunctivae and sclerae normal, no   icterus, no pallor, corneas clear, PERRLA   Ears:    Ears appear intact with no abnormalities noted   Throat:   No oral lesions, no thrush, oral mucosa moist   Neck:   No adenopathy, supple, trachea midline, no thyromegaly, no     carotid bruit, no JVD   Back:     No kyphosis present, no scoliosis present, no skin lesions,       erythema or scars, no tenderness to percussion or                   palpation,   range of motion normal   Lungs:     Clear to auscultation,respirations regular, even and                   unlabored    Heart:    Regular rhythm and normal rate, normal S1 and S2, no            murmur, no gallop, no rub, no click   Breast Exam:    Deferred    Abdomen:     Normal bowel sounds, no masses, no organomegaly, soft        non-tender, non-distended, no guarding, no rebound                 tenderness   Genitalia:    Deferred   Extremities:   4/5 strength in the lower extremities bilaterally.     Pulses:   Pulses palpable and equal bilaterally   Skin:   No bleeding, bruising or rash   Lymph nodes:   No palpable adenopathy   Neurologic:   Cranial nerves 2 - 12 grossly intact, sensation intact, DTR        present and equal bilaterally       EKG:      Normal sinus rhythm with inverted T waves in 1, aVL, V5, V6    Telemetry:      Normal sinus rhythm noted    I have personally looked at both the EKG and the telemetry strips.    Labs:      Results from last 7 days  Lab Units 03/02/17  2309 02/27/17  0242   WBC 10*3/mm3 7.45 8.70   HEMOGLOBIN g/dL 10.2* 10.1*   HEMATOCRIT % 31.8* 32.6*   MCV fL 90.1 94.2   MCHC g/dL 32.1 31.0   PLATELETS 10*3/mm3 221 205   INR  1.83*  --            Results from last 7 days  Lab Units 03/02/17 2309 02/27/17  0242   SODIUM mmol/L 130* 132*   POTASSIUM mmol/L 6.1* 5.1   CHLORIDE mmol/L 92* 89*   TOTAL CO2 mmol/L 27.0 35.0*   BUN mg/dL 73* 28*   CREATININE mg/dL 3.20* 1.20   EGFR IF NONAFRICN AM mL/min/1.73 14* 44*   CALCIUM mg/dL 9.1 9.2   GLUCOSE mg/dL 159* 179*   ALBUMIN g/dL 4.10 4.30   BILIRUBIN mg/dL 1.4* 0.9   ALK PHOS U/L 166* 132*   AST (SGOT) U/L 730* 464*   ALT (SGPT) U/L 914* 603*   Estimated Creatinine Clearance: 16.3 mL/min (by C-G formula based on Cr of 3.2).  No results found for: AMMONIA          Lab Results   Component Value Date    HGBA1C 5.7 04/23/2016     Lab Results   Component Value Date    TSH 1.14 01/14/2016     No results found for: PREGTESTUR, PREGSERUM, HCG, HCGQUANT  Pain Management Panel     There is no flowsheet data to display.                          Radiology:    Imaging Results (last 7 days)     ** No results found for the last 168 hours. **          Assessment:    1.  Acute renal failure  2.  Elevated  LFTs, unknown etiology  3.  Acute hyperkalemia  4.  Diabetes type 2  5.  Hypertension  6.  Chronic anxiety disorder  7.  Ischemic cardiomyopathy with ejection fraction 25%  8.  COPD on home O2  9.  Restless leg syndrome  10.  Coronary artery disease with stent in 2008  11.  AICD status  12.  Constipation       Plan:     We will give IV fluids.  We will hold Lasix, Aldactone, Cozaar, Lipitor, Glucophage, will check a liver ultrasound.  Check iron studies as well as hepatitis testing.  Check a urinalysis as well as urine sodium.  We'll place on lactulose and also give enemas.  Consult Dr. Adler from nephrology.  We will also consult Dr. Fields from gastroenterology.  Place her on her other home medications.  Consult PT and OT.  Recheck potassium this morning.  We'll place on Lovenox for DVT prophylaxis.  We'll give Xanax for anxiety.  Further recommendations will depend on the clinical course.  Anticipated stay is greater than 2 midnights.    Cezar Panda DO  03/03/17  3:32 AM

## 2017-03-03 NOTE — CONSULTS
Nephrology Consultation    Referring Provider:   No ref. provider found    Reason for Consultation:  Acute Kidney Injury and baseline chronic kidney disease stage III.        Subjective     Chief complaint   Chief Complaint   Patient presents with   • Muscle Pain       History of present illness:    Patient is 72-year-old white female who presented to the emergency room after she noted to have severe anxiety and she ran out of for the next 3 days ago.  In the emergency room she also noted to have increased confusion.  It appears that she did use more than what she was supposed to use.  She also been taking Excedrin for headaches.  Emergency room documentation as well as admission H&P done by Dr. Panda was reviewed by me.    She was also noted to have abnormal liver enzymes.  She has seen Dr. Fields in the past.    In the emergency room she was noted to have worsening BUN and creatinine.  She was finally admitted for further evaluation and treatment.       Past Medical History   Diagnosis Date   • Anemia    • Anxiety    • Arthritis    • Bacterial sinusitis    • Chronic back pain    • Chronic obstructive lung disease    • Colon cancer screening    • Colon polyps       · Last Impression: 09 Dec 2015  History of multiple colon polyps including tubularadenomata.  Maximino Fields (Gastroenterology)   • Constipation    • Depression    • Diarrhea    • Disease of thyroid gland    • Dysphagia 01/20/2013   • Esophageal reflux    • Esophagitis, reflux    • Hernia    • History of bronchitis    • History of cardiac pacemaker    • History of degenerative disc disease    • History of tobacco abuse    • History of urinary tract infection    • Hyperlipidemia    • Hypothyroidism    • Myocardial infarction    • Nausea    • Occult blood in stools      Impression: 12/09/2015 - This could be multifactorial including secondary to small nasal bleed.  However the small nasal bleed does not explain anemia as such.  The patient previously had  undergone a colonoscopy in February 2014.  However unfortunately the prep was considered to be suboptimal.;    • Pancreatitis      status post ERCP for common duct stone in 2001   • RLS (restless legs syndrome)        Past Surgical History   Procedure Laterality Date   • Back surgery  1992   • Colonoscopy  2009     Fiberoptic (Onset Date: 2009)   • Internal cardiac defibrillator insertion       Dual Lead Cardioverter-Defibrilator   • Coronary stent placement  1994   • Cardiac pacemaker placement     • Cardiac catheterization     • Pacemaker implantation  1994   • Cholecystectomy  1968   • Tonsillectomy  1955   • Hysterectomy  1998       Family History   Problem Relation Age of Onset   • Dementia Mother    • Pneumonia Mother    • COPD Father    • Emphysema Father    • Lung cancer Sister    • Cancer Sister    • COPD Sister    negative h/o ESRD     Social History   Substance Use Topics   • Smoking status: Former Smoker   • Smokeless tobacco: Never Used   • Alcohol use No     Prescriptions Prior to Admission   Medication Sig Dispense Refill Last Dose   • ALPRAZolam (XANAX) 0.5 MG tablet Take 1 tablet by mouth 3 (Three) Times a Day As Needed (panic attack).   2/28/2017   • amoxicillin-clavulanate (AUGMENTIN) 500-125 MG per tablet Take 1 tablet by mouth 3 (Three) Times a Day. 30 tablet 0 Taking   • atorvastatin (LIPITOR) 80 MG tablet Take 1 tablet by mouth Every Night. 30 tablet 0 3/1/2017   • budesonide-formoterol (SYMBICORT) 160-4.5 MCG/ACT inhaler Inhale 2 puffs 2 (Two) Times a Day. Rinse mouth with water after use. 3 inhaler 3 3/1/2017   • buPROPion XL (WELLBUTRIN XL) 150 MG 24 hr tablet Take 1 tablet by mouth Every Morning.   3/1/2017   • clopidogrel (PLAVIX) 75 MG tablet TAKE ONE TABLET BY MOUTH DAILY 30 tablet 4 3/1/2017   • diphenoxylate-atropine (LOMOTIL) 2.5-0.025 MG per tablet TAKE 2-3 TABS PO PRN DIARRHEA. 30 tablet 2 3/1/2017   • escitalopram (LEXAPRO) 10 MG tablet Take 1 tablet by mouth Daily.   3/1/2017   •  furosemide (LASIX) 40 MG tablet Take 1 tablet by mouth Daily As Needed (swelling). If needed 30 tablet 5 3/1/2017   • insulin glargine (LANTUS) 100 UNIT/ML injection Inject 22 Units under the skin Every Night. 660 Units 3 3/1/2017   • ipratropium-albuterol (COMBIVENT RESPIMAT)  MCG/ACT inhaler Inhale 1 puff 4 (Four) Times a Day As Needed for shortness of air. 3 g 3 3/1/2017   • levothyroxine (SYNTHROID, LEVOTHROID) 125 MCG tablet TAKE ONE TABLET BY MOUTH DAILY 30 tablet 4 3/1/2017   • lidocaine (LIDODERM) 5 % Place 1 patch on the skin every day. 30 patch 1 3/1/2017   • losartan (COZAAR) 50 MG tablet Take 1 tablet by mouth 2 (Two) Times a Day. 60 tablet 5 3/1/2017   • melatonin 3 MG tablet Take 1 tablet by mouth At Night As Needed for sleep.   3/1/2017   • metFORMIN XR (GLUCOPHAGE-XR) 750 MG 24 hr tablet Take 1 tablet by mouth Daily With Breakfast. 30 tablet 2 3/1/2017   • metoclopramide (REGLAN) 5 MG tablet Take 0.5 tablets by mouth 2 (Two) Times a Day. 90 tablet 0 3/1/2017   • metoprolol tartrate (LOPRESSOR) 25 MG tablet Take 1 tablet by mouth 2 (Two) Times a Day.   3/1/2017   • mirtazapine (REMERON) 45 MG tablet Take 1 tablet by mouth Every Night.   3/1/2017   • pantoprazole (PROTONIX) 40 MG EC tablet Take 1 tablet by mouth 2 (Two) Times a Day. 60 tablet 5 3/1/2017   • polyethylene glycol (MIRALAX) packet Take 17 g by mouth Daily As Needed (constipation). 850 g 0 3/1/2017   • promethazine (PHENERGAN) 12.5 MG tablet Take 2 tablets by mouth Every 6 (Six) Hours As Needed for nausea or vomiting. 20 tablet 0 3/1/2017   • ranitidine (ZANTAC) 150 MG capsule Take 1 capsule by mouth 2 (Two) Times a Day As Needed for indigestion or heartburn. 60 capsule 11 3/1/2017   • rOPINIRole (REQUIP) 1 MG tablet Take 1 tablet by mouth Every Night. Take 1 hour before bedtime.   3/1/2017   • spironolactone (ALDACTONE) 25 MG tablet TAKE ONE TABLET BY MOUTH DAILY 30 tablet 2 3/1/2017   • tiotropium (SPIRIVA HANDIHALER) 18 MCG per  "inhalation capsule Place 2 puffs into inhaler and inhale Daily. 90 capsule 3 3/1/2017     Allergies:  Amphetamine-dextroamphetamine; Lorazepam; and Other    Review of Systems  Constitutional: Negative for fever. Negative for chills, diaphoresis, positive for fatigue .  HENT: Negative for congestion and hearing loss.   Eyes: Negative for redness and visual disturbance.   Respiratory: Positive for shortness of breath. Negative for chest pain . Negative for cough and chest tightness.   Cardiovascular: Negative for chest pain and palpitations.   Gastrointestinal: Negative for abdominal distention, she feels she's been constipated and also have nonspecific abdominal pain but denies any blood in stool.   Endocrine: Negative for cold intolerance and heat intolerance.   Genitourinary: Negative for difficulty urinating, dysuria and frequency.   Musculoskeletal: Negative for arthralgias, back pain and myalgias.   Skin: Negative for color change, rash and wound.   Neurological: Negative for syncope, positive for weakness and headaches.   Hematological: Negative for adenopathy. Does not bruise/bleed easily.   Psychiatric/Behavioral: Positive for confusion. The patient is nervous/anxious.     Objective     Vital Signs  Visit Vitals   • /68   • Pulse 76   • Temp 97.9 °F (36.6 °C) (Oral)   • Resp 18   • Ht 65\" (165.1 cm)   • Wt 177 lb (80.3 kg)   • SpO2 99%   • BMI 29.45 kg/m2                 Intake/Output Summary (Last 24 hours) at 03/03/17 1003  Last data filed at 03/03/17 0430   Gross per 24 hour   Intake   1000 ml   Output      0 ml   Net   1000 ml       Physical Exam:     General Appearance:   Alert, cooperative, in no acute distress.     Head:   Normocephalic, without obvious abnormality, atraumatic.     Eyes:       Normal, conjunctivae and sclerae, no icterus, no pallor, corneas clear, PERRLA        Throat:   Oral mucosa dry      Neck:  No adenopathy, supple, trachea midline, no thyromegaly, no carotid bruit, no " JVD      Back:   No CVA tenderness on Percussion.     Lungs:    Clear to auscultation and fair air movement noted.      Heart:   Regular rhythm and normal rate, normal S1 and S2.       Abdomen:   Obese. Normal bowel sounds, no masses, no organomegaly, soft non-tender, non-distended, no guarding, no rebound tenderness        Extremities:  Moves all extremities, no edema, no cyanosis, no redness.     Pulses:  Pulses palpable and equal bilaterally but weak.     Skin:  No bleeding, bruising or rash        Neurologic:  Cranial nerves grossly intact, move all extremities             Results Review:  Results for orders placed or performed during the hospital encounter of 03/02/17   Comprehensive Metabolic Panel   Result Value Ref Range    Glucose 159 (H) 74 - 98 mg/dL    BUN 73 (H) 7 - 20 mg/dL    Creatinine 3.20 (H) 0.60 - 1.30 mg/dL    Sodium 130 (L) 137 - 145 mmol/L    Potassium 6.1 (C) 3.5 - 5.1 mmol/L    Chloride 92 (L) 98 - 107 mmol/L    CO2 27.0 26.0 - 30.0 mmol/L    Calcium 9.1 8.4 - 10.2 mg/dL    Total Protein 6.9 6.3 - 8.2 g/dL    Albumin 4.10 3.50 - 5.00 g/dL    ALT (SGPT) 914 (C) 13 - 69 U/L    AST (SGOT) 730 (C) 15 - 46 U/L    Alkaline Phosphatase 166 (H) 38 - 126 U/L    Total Bilirubin 1.4 (H) 0.2 - 1.3 mg/dL    eGFR Non African Amer 14 (L) >60 mL/min/1.73    Globulin 2.8 gm/dL    A/G Ratio 1.5 1.0 - 2.0 g/dL    BUN/Creatinine Ratio 22.8 7.1 - 23.5    Anion Gap 17.1 mmol/L   Protime-INR   Result Value Ref Range    Protime 20.1 (H) 9.3 - 12.1 Seconds    INR 1.83 (H) 0.90 - 1.10   CBC Auto Differential   Result Value Ref Range    WBC 7.45 4.80 - 10.80 10*3/mm3    RBC 3.53 (L) 4.20 - 5.40 10*6/mm3    Hemoglobin 10.2 (L) 12.0 - 16.0 g/dL    Hematocrit 31.8 (L) 37.0 - 47.0 %    MCV 90.1 81.0 - 99.0 fL    MCH 28.9 27.0 - 31.0 pg    MCHC 32.1 30.0 - 37.0 g/dL    RDW 17.8 (H) 11.5 - 14.5 %    RDW-SD 58.5 (H) 37.0 - 54.0 fl    MPV 10.8 6.0 - 12.0 fL    Platelets 221 130 - 400 10*3/mm3    Neutrophil % 91.5 (H) 37.0 -  80.0 %    Lymphocyte % 2.6 (L) 10.0 - 50.0 %    Monocyte % 5.5 0.0 - 12.0 %    Eosinophil % 0.0 0.0 - 7.0 %    Basophil % 0.1 0.0 - 2.5 %    Immature Grans % 0.3 0.0 - 0.6 %    Neutrophils, Absolute 6.82 2.00 - 6.90 10*3/mm3    Lymphocytes, Absolute 0.19 (L) 0.60 - 3.40 10*3/mm3    Monocytes, Absolute 0.41 0.00 - 0.90 10*3/mm3    Eosinophils, Absolute 0.00 0.00 - 0.70 10*3/mm3    Basophils, Absolute 0.01 0.00 - 0.20 10*3/mm3    Immature Grans, Absolute 0.02 0.00 - 0.06 10*3/mm3    nRBC 0.0 0.0 - 0.0 /100 WBC   CBC Auto Differential   Result Value Ref Range    WBC 7.93 4.80 - 10.80 10*3/mm3    RBC 3.43 (L) 4.20 - 5.40 10*6/mm3    Hemoglobin 9.9 (L) 12.0 - 16.0 g/dL    Hematocrit 31.3 (L) 37.0 - 47.0 %    MCV 91.3 81.0 - 99.0 fL    MCH 28.9 27.0 - 31.0 pg    MCHC 31.6 30.0 - 37.0 g/dL    RDW 17.7 (H) 11.5 - 14.5 %    RDW-SD 59.5 (H) 37.0 - 54.0 fl    MPV 11.5 6.0 - 12.0 fL    Platelets 244 130 - 400 10*3/mm3    Neutrophil % 82.3 (H) 37.0 - 80.0 %    Lymphocyte % 5.3 (L) 10.0 - 50.0 %    Monocyte % 11.9 0.0 - 12.0 %    Eosinophil % 0.0 0.0 - 7.0 %    Basophil % 0.1 0.0 - 2.5 %    Immature Grans % 0.4 0.0 - 0.6 %    Neutrophils, Absolute 6.53 2.00 - 6.90 10*3/mm3    Lymphocytes, Absolute 0.42 (L) 0.60 - 3.40 10*3/mm3    Monocytes, Absolute 0.94 (H) 0.00 - 0.90 10*3/mm3    Eosinophils, Absolute 0.00 0.00 - 0.70 10*3/mm3    Basophils, Absolute 0.01 0.00 - 0.20 10*3/mm3    Immature Grans, Absolute 0.03 0.00 - 0.06 10*3/mm3    nRBC 0.0 0.0 - 0.0 /100 WBC   Comprehensive Metabolic Panel   Result Value Ref Range    Glucose 96 74 - 98 mg/dL    BUN 71 (H) 7 - 20 mg/dL    Creatinine 3.10 (H) 0.60 - 1.30 mg/dL    Sodium 131 (L) 137 - 145 mmol/L    Potassium 5.3 (H) 3.5 - 5.1 mmol/L    Chloride 93 (L) 98 - 107 mmol/L    CO2 28.0 26.0 - 30.0 mmol/L    Calcium 9.3 8.4 - 10.2 mg/dL    Total Protein 6.9 6.3 - 8.2 g/dL    Albumin 4.10 3.50 - 5.00 g/dL    ALT (SGPT) 904 (C) 13 - 69 U/L    AST (SGOT) 721 (C) 15 - 46 U/L    Alkaline  Phosphatase 154 (H) 38 - 126 U/L    Total Bilirubin 1.3 0.2 - 1.3 mg/dL    eGFR Non African Amer 15 (L) >60 mL/min/1.73    Globulin 2.8 gm/dL    A/G Ratio 1.5 1.0 - 2.0 g/dL    BUN/Creatinine Ratio 22.9 7.1 - 23.5    Anion Gap 15.3 mmol/L   Ferritin   Result Value Ref Range    Ferritin 78.60 11.10 - 264.00 ng/mL   Iron Profile   Result Value Ref Range    Iron 33 (L) 37 - 181 mcg/dL    TIBC 423 261 - 497 mcg/dL    Iron Saturation 8 (L) 11 - 46 %   Lipase   Result Value Ref Range    Lipase 153 23 - 300 U/L   Lipid Panel   Result Value Ref Range    Total Cholesterol 112 0 - 199 mg/dL    Triglycerides 75 <150 mg/dL    HDL Cholesterol 38 (L) 40 - 60 mg/dL    LDL Cholesterol  59 0 - 99 mg/dL    VLDL Cholesterol 15 mg/dL    LDL/HDL Ratio 1.55    Magnesium   Result Value Ref Range    Magnesium 1.8 1.6 - 2.3 mg/dL   TSH   Result Value Ref Range    TSH 1.370 0.470 - 4.680 mIU/mL   Ammonia   Result Value Ref Range    Ammonia 17 9 - 30 umol/L   POC Glucose Fingerstick   Result Value Ref Range    Glucose 115 70 - 130 mg/dL     Imaging Results (last 72 hours)     Procedure Component Value Units Date/Time    US Liver [[id number]] Collected:  03/03/17 0921     Updated:  03/03/17 0923    Narrative:       PROCEDURE: US LIVER-     HISTORY: elevated lfts; N17.9-Acute kidney failure, unspecified;  R74.8-Abnormal levels of other serum enzymes; E87.5-Hyperkalemia     PROCEDURE: Ultrasound images of the right upper quadrant were obtained.     FINDINGS: Limited images of the pancreas are unremarkable. The liver  parenchyma is normal in echogenicity. The gallbladder is surgically  absent.  There is no ascites.  The common duct measures 5.6 mm      .  Limited images of the right kidney are unremarkable.       Impression:       Unremarkable right upper quadrant ultrasound.     This report was finalized on 3/3/2017 9:21 AM by Mercedes Zhou M.D..              budesonide-formoterol 2 puff Inhalation BID - RT   buPROPion  mg Oral QAM    clopidogrel 75 mg Oral Daily   docusate sodium 100 mg Oral BID   enoxaparin 30 mg Subcutaneous Daily   escitalopram 10 mg Oral Daily   insulin aspart 0-7 Units Subcutaneous 4x Daily AC & at Bedtime   insulin detemir 20 Units Subcutaneous Nightly   ipratropium-albuterol 3 mL Nebulization Q6H - RT   lactulose 20 g Oral BID   levothyroxine 125 mcg Oral Q AM   metoprolol tartrate 25 mg Oral BID   mirtazapine 45 mg Oral Nightly   pantoprazole 40 mg Oral BID   rOPINIRole 1 mg Oral Nightly       sodium chloride 125 mL/hr Last Rate: 125 mL/hr (03/03/17 0430)       Assessment/Plan     1.   Acute renal failure: It appears that she has multiple reasons including decreased volume as well as angiotensin receptor on board along with using of non-steroidal's.  Most of these medicines off continue hydration and reassess on day-to-day basis.  Likely will settle to baseline.  2.   Anemia of chronic disease: Workup as ordered she may need to further GI workup, it appears she will need to be assessed by the gastroenterologist for abnormal liver function and probably will need EGD and colonscopy done as well  3.   Chronic kidney disease (CKD) stage G3a/A1, moderately decreased glomerular filtration rate (GFR) between 45-59 mL/min/1.73 square meter and albuminuria creatinine ratio less than 30 mg/g: She has known to have baseline chronic kidney disease stage III does not have any proteinuria will go ahead and recheck UA again today.  4.   Chronic coronary artery disease: She does not had any acute symptoms.  5.   Chronic systolic CHF (congestive heart failure), NYHA class 3: Clinically appears to be stable.  6.   Controlled type 2 diabetes mellitus with circulatory disorder, with long-term current use of insulin: Continue current treatment plan.  7.   Essential hypertension: It appears to be under fair control at this point depending on her blood pressure will have to be reassessed on day-to-day basis will hold off using any  angiotensin receptor blocker at this point.  She will likely benefit from it volume status is more stable.  8.   Ischemic cardiomyopathy: Continue to optimize medications.  9.   GERD (gastroesophageal reflux disease): Treated clinically appears to be stable  10.   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 attack: Home medications have been restarted.  11.   Functional diarrhea: Currently does not complain of it.          Details discussed with the patient as well as family and the hospitalist service.     Rest as ordered    Dallas Adler MD  03/03/17  10:03 AM    Please note that portions of this note may have been completed with a voice recognition program. Efforts were made to edit the dictations, but occasionally words are mistranscribed.

## 2017-03-03 NOTE — PLAN OF CARE
Problem: Patient Care Overview (Adult)  Goal: Plan of Care Review  Outcome: Ongoing (interventions implemented as appropriate)    03/03/17 1201   Coping/Psychosocial Response Interventions   Plan Of Care Reviewed With patient   Patient Care Overview   Progress progress toward functional goals as expected   Outcome Evaluation   Outcome Summary/Follow up Plan PT shanae completed. Patient found sitting EOB. She is able to perform all mobility, including ambulating 80 feet with RW, with 3 LPM O2 and CGA. She is expected to participate well with, and benefit from, skilled PT intervention.         Problem: Inpatient Physical Therapy  Goal: Transfer Training Goal 1 LTG- PT  Outcome: Ongoing (interventions implemented as appropriate)    03/03/17 1201   Transfer Training PT LTG   Transfer Training PT LTG, Date Established 03/03/17   Transfer Training PT LTG, Time to Achieve 2 wks   Transfer Training PT LTG, Activity Type bed to chair /chair to bed;sit to stand/stand to sit   Transfer Training PT LTG, [place] Level conditional independence   Transfer Training PT LTG, Assist Device walker, rolling   Transfer Training PT LTG, Outcome goal ongoing       Goal: Gait Training Goal LTG- PT  Outcome: Ongoing (interventions implemented as appropriate)    03/03/17 1201   Gait Training PT LTG   Gait Training Goal PT LTG, Date Established 03/03/17   Gait Training Goal PT LTG, Time to Achieve 2 wks   Gait Training Goal PT LTG, [place] Level conditional independence   Gait Training Goal PT LTG, Assist Device walker, rolling   Gait Training Goal PT LTG, Distance to Achieve 200   Gait Training Goal PT LTG, Outcome goal ongoing       Goal: Strength Goal LTG- PT  Outcome: Ongoing (interventions implemented as appropriate)    03/03/17 1201   Strength Goal PT LTG   Strength Goal PT LTG, Date Established 03/03/17   Strength Goal PT LTG, Time to Achieve 2 wks   Strength Goal PT LTG, Measure to Achieve Patient will perform B LE ther ex x 15  reps to improve functional strength for mobility.   Strength Goal PT LTG, Outcome goal ongoing

## 2017-03-03 NOTE — PLAN OF CARE
Problem: Patient Care Overview (Adult)  Goal: Plan of Care Review  Outcome: Ongoing (interventions implemented as appropriate)    03/03/17 1603   Coping/Psychosocial Response Interventions   Plan Of Care Reviewed With patient;daughter   Patient Care Overview   Progress no change   Outcome Evaluation   Outcome Summary/Follow up Plan Labs are somewhat better, but pt. is still itching a lot and very anxious.       Goal: Adult Individualization and Mutuality  Outcome: Ongoing (interventions implemented as appropriate)  Goal: Discharge Needs Assessment  Outcome: Ongoing (interventions implemented as appropriate)    Problem: Renal Failure/Kidney Injury, Acute (Adult)  Goal: Signs and Symptoms of Listed Potential Problems Will be Absent or Manageable (Renal Failure/Kidney Injury, Acute)  Outcome: Ongoing (interventions implemented as appropriate)    Problem: Fall Risk (Adult)  Goal: Identify Related Risk Factors and Signs and Symptoms  Outcome: Ongoing (interventions implemented as appropriate)  Goal: Absence of Falls  Outcome: Ongoing (interventions implemented as appropriate)

## 2017-03-03 NOTE — NURSING NOTE
Pt has bilateral bruising and scabbed areas on knees and toes on both feet. States she fell at home on Feb. 27th.

## 2017-03-04 NOTE — PLAN OF CARE
Problem: Patient Care Overview (Adult)  Goal: Plan of Care Review  Outcome: Ongoing (interventions implemented as appropriate)  Goal: Adult Individualization and Mutuality  Outcome: Ongoing (interventions implemented as appropriate)    Problem: Renal Failure/Kidney Injury, Acute (Adult)  Goal: Signs and Symptoms of Listed Potential Problems Will be Absent or Manageable (Renal Failure/Kidney Injury, Acute)  Outcome: Ongoing (interventions implemented as appropriate)    Problem: Fall Risk (Adult)  Goal: Identify Related Risk Factors and Signs and Symptoms  Outcome: Ongoing (interventions implemented as appropriate)

## 2017-03-04 NOTE — PLAN OF CARE
Problem: Patient Care Overview (Adult)  Goal: Plan of Care Review  Outcome: Ongoing (interventions implemented as appropriate)  Goal: Adult Individualization and Mutuality  Outcome: Ongoing (interventions implemented as appropriate)  Goal: Discharge Needs Assessment  Outcome: Ongoing (interventions implemented as appropriate)    Problem: Renal Failure/Kidney Injury, Acute (Adult)  Goal: Signs and Symptoms of Listed Potential Problems Will be Absent or Manageable (Renal Failure/Kidney Injury, Acute)  Outcome: Ongoing (interventions implemented as appropriate)    Problem: Fall Risk (Adult)  Goal: Identify Related Risk Factors and Signs and Symptoms  Outcome: Ongoing (interventions implemented as appropriate)  Goal: Absence of Falls  Outcome: Ongoing (interventions implemented as appropriate)

## 2017-03-04 NOTE — PROGRESS NOTES
"Adult Nutrition  Assessment/PES    Patient Name:  Yudy Vásquez  YOB: 1945  MRN: [id number]  Admit Date:  3/2/2017    Assessment Date:  3/4/2017        Reason for Assessment       03/04/17 1051    Reason for Assessment    Reason For Assessment/Visit admission assessment    Identified At Risk By Screening Criteria weight status;diagnosis;BMI    Diagnosis Diagnosis    Endocrine DM Type 2;Hypothyroid    Gastrointestinal Constipation;Other (comment);GERD/Reflux   Delayed Gastric Emptying    Pulmonary/Critical Care COPD    Renal CKD;ARF    Factors Affecting Nutrition Factors    Appetite Poor at this Time    Oral Other (comment)   Possible chew/swallow difficulty per pt.                Anthropometrics       03/04/17 1057    Anthropometrics    Height Method Stated    Height 165.1 cm (65\")    Weight Method Bed scale    Weight 84.8 kg (186 lb 15.2 oz)    Ideal Body Weight (IBW)    Ideal Body Weight (IBW), Female 57.69    % Ideal Body Weight 147.3    Body Mass Index (BMI)    BMI (kg/m2) 31.18    BMI Grade 30 - 34.9- obesity - grade I            Labs/Tests/Procedures/Meds       03/04/17 1058    Labs/Tests/Procedures/Meds    Labs/Tests Review Reviewed   High: Glucose, Cr, BUN, Phosphorus    Low: Na, Albumin, Hgb, Hct    Medication Review Reviewed, pertinent;Insulin              Estimated/Assessed Needs       03/04/17 1059    Calculation Measurements    Weight Used For Calculations 64.4 kg (141 lb 15.6 oz)   Adjusted BW    Height Used for Calculations 1.651 m (5' 5\")    Estimated/Assessed Energy Needs    Energy Need Method [place]    Age 72    RMR ([place] Equation) 1149.88    Activity Factors ([place])  Out of bed, ambulatory  1.3    Estimated Kcal Range  ~8787-6575 Kcal    Estimated/Assessed Protein Needs    Weight Used for Protein Calculation 64.4 kg (141 lb 15.6 oz)   Adjusted BW    Protein (gm/kg) 0.8    0.8 Gm Protein (gm) 51.52    Estimated Protein Range ~38.64 - 51.52 gm    " Estimated/Assessed Fluid Needs    Fluid Need Method RDA method    RDA Method (mL)  --   output +  1000 ml            Nutrition Prescription Ordered       03/04/17 1103    Nutrition Prescription PO    Current PO Diet Regular    Common Modifiers Renal;Consistent Carbohydrate;Cardiac                Problem/Interventions:        Problem 1       03/04/17 1103    Nutrition Diagnoses Problem 1    Problem 1 Altered Nutrition Related to Labs    Etiology (related to) Medical Diagnosis    Endocrine DM2    Gastrointestinal --    Pulmonary/Critical Care COPD    Renal CKD;ARF    Signs/Symptoms (evidenced by) Other (comment)   Elevated: Glucose, Cr, BUN, Phophorus   Low: Na, Albumin, Hgb, Hct            Problem 2       03/04/17 1105    Nutrition Diagnoses Problem 2    Problem 2 Altered GI Function    Etiology (related to) Medical Diagnosis    Gastrointestinal Constipation    Signs/Symptoms (evidenced by) Other (comment)   Constipation noted            Problem 3       03/04/17 1106    Nutrition Diagnoses Problem 3    Problem 3 Increased Nutrient Needs    Macronutrient Kcal;Fluid;Protein    Micronutrient Vitamin;Mineral    Etiology (related to) Medical Diagnosis    Pulmonary/Critical Care COPD    Signs/Symptoms (evidenced by) Other (comment)   Excessive caloric expenditure secondary to pulmonary dysfunction            Problem 4       03/04/17 1106    Nutrition Diagnoses Problem 4    Problem 4 Overweight/Obesity    Etiology (related to) Other (comment)   over consumption of calories PTA    Signs/Symptoms (evidenced by) % IBW    Percent (%) .3 %              Intervention Goal       03/04/17 1107    Intervention Goal    General Meet nutritional needs for age/condition    PO PO intake (%)    PO Intake % 50 %            Nutrition Intervention       03/04/17 1107    Nutrition Intervention    RD/Tech Action Encourage intake;Supplement provided;Follow Tx progress;Advise available snack            Nutrition Prescription       03/04/17  1107    Nutrition Prescription PO    PO Prescription Begin/change supplement    Supplement Nepro    Supplement Frequency 2 times a day    New PO Prescription Ordered? Yes    Other Orders    Obtain Weight Daily    Supplement Vitamin mineral supplement    Supplement Ordered? No, recommended    Other Consider speech evalluation secondary to pt states possible dysphagia            Education/Evaluation       03/04/17 1108    Education    Education Education topics;Provided education regarding    Provided education regarding Diet rationale    Monitor/Evaluation    Monitor Per protocol;PO intake;Supplement intake;Weight;Skin status        Comments:  Rec. #1: Consider speech evaluation secondary to pt. States possible dysphagia. Rec. #2: Consider administering Insulin post meal secondary to delayed gastric emptying noted. Rec. #3: Consider adding renal MVI once medically feasible.Rec. #4: Continue with current diet order; Continue encouraging p.o. Intake. Rec. #5: Continue to monitor electrolytes. Pt. Exhibits decreased p.o. Intake, consuming ~25% of 3 meals per NSG report with 1 meal refusal noted. Pt. To receive nutritional supplement Nepro BID. RD to follow pt. Consult RD PRN.     Electronically signed by:  Citlalli Pfeiffer RD  03/04/17 11:10 AM

## 2017-03-04 NOTE — PROGRESS NOTES
"Nephrology Progress Note.    LOS: 1 day    Patient Care Team:  Phuong Vera MD as PCP - General    Chief Complaint:    Chief Complaint   Patient presents with   • Muscle Pain       Subjective   Patient seen and examined this morning.  Events from last night noted.  Patient denies having any fevers chills.  No nausea or vomiting no abdominal pain.  Denies any chest pain shortness of breath cough or sputum production.  There is no significant edema noted.  Patient also denies having new onset weakness of numbness of either extremity.  She does not think that she is any significantly better than yesterday.      Review of Systems:    The pertinent  ROS was done and it is noted above, rest  was negative.    Objective     Vital Signs  Visit Vitals   • /58 (BP Location: Left arm, Patient Position: Lying)   • Pulse 61   • Temp 97.5 °F (36.4 °C) (Oral)   • Resp 16   • Ht 65\" (165.1 cm)   • Wt 187 lb (84.8 kg)  Comment: Bed scale zeroed out and patient reweighed, Kristi RN notified of and verified weight gain.   • SpO2 96%   • BMI 31.12 kg/m2              Intake/Output Summary (Last 24 hours) at 03/04/17 0749  Last data filed at 03/03/17 2120   Gross per 24 hour   Intake   4190 ml   Output    250 ml   Net   3940 ml       Physical Exam:    General Appearance: alert, oriented x 3, no acute distress,   HEENT: pupils round and reactive to light, oral mucosa dry, extra occular movements intact.  Neck: supple, no JVD, trachea midline  Lungs: Clear to Auscultation, unlabored breathing effort  Heart: RRR, normal S1 and S2, no S3, no rub  Abdomen: Obese soft, non-tender, no palpable bladder, present bowel sounds to auscultation  Extremities: Trace edema, cyanosis or clubbing.   Neuro: normal speech and mental status, grossly non focal.     Results Review:      Results from last 7 days  Lab Units 03/04/17  0400 03/04/17  0007 03/03/17 2006 03/03/17  0606 03/02/17  2309 02/27/17  0242   SODIUM mmol/L 132*  --   --   --  131* " 130* 132*   POTASSIUM mmol/L 4.9  4.9 5.4* 4.8  < > 5.3* 6.1* 5.1   CHLORIDE mmol/L 94*  --   --   --  93* 92* 89*   TOTAL CO2 mmol/L 27.0  --   --   --  28.0 27.0 35.0*   BUN mg/dL 68*  --   --   --  71* 73* 28*   CREATININE mg/dL 3.10*  --   --   --  3.10* 3.20* 1.20   CALCIUM mg/dL 8.3*  --   --   --  9.3 9.1 9.2   BILIRUBIN mg/dL  --   --   --   --  1.3 1.4* 0.9   ALK PHOS U/L  --   --   --   --  154* 166* 132*   ALT (SGPT) U/L  --   --   --   --  904* 914* 603*   AST (SGOT) U/L  --   --   --   --  721* 730* 464*   GLUCOSE mg/dL 167*  --   --   --  96 159* 179*   < > = values in this interval not displayed.    Estimated Creatinine Clearance: 17.4 mL/min (by C-G formula based on Cr of 3.1).      Results from last 7 days  Lab Units 03/04/17  0400 03/03/17  0606   MAGNESIUM mg/dL  --  1.8   PHOSPHORUS mg/dL 5.7*  --      Results for ASHLIE VALENTIN (MRN [id number]) as of 3/4/2017 07:46   Ref. Range 3/3/2017 06:07   Iron Latest Ref Range: 37 - 181 mcg/dL 33 (L)   Ferritin Latest Ref Range: 11.10 - 264.00 ng/mL 78.60   Iron Saturation Latest Ref Range: 11 - 46 % 8 (L)   TIBC Latest Ref Range: 261 - 497 mcg/dL 423             Results from last 7 days  Lab Units 03/04/17  0400 03/03/17  0514 03/02/17  2309 02/27/17  0242   WBC 10*3/mm3 8.50 7.93 7.45 8.70   HEMOGLOBIN g/dL 10.5* 9.9* 10.2* 10.1*   PLATELETS 10*3/mm3 209 244 221 205     Results for ASHLIE VALENTIN (MRN [id number]) as of 3/4/2017 07:46   Ref. Range 3/3/2017 05:14   Reticulocyte % Latest Ref Range: 0.50 - 1.50 % 4.21 (H)       Results from last 7 days  Lab Units 03/04/17  0007 03/02/17  2309   INR  1.56* 1.83*         Imaging Results (last 24 hours)     Procedure Component Value Units Date/Time    US Liver [[id number]] Collected:  03/03/17 0921     Updated:  03/03/17 0923    Narrative:       PROCEDURE: US LIVER-     HISTORY: elevated lfts; N17.9-Acute kidney failure, unspecified;  R74.8-Abnormal levels of other serum enzymes; E87.5-Hyperkalemia      PROCEDURE: Ultrasound images of the right upper quadrant were obtained.     FINDINGS: Limited images of the pancreas are unremarkable. The liver  parenchyma is normal in echogenicity. The gallbladder is surgically  absent.  There is no ascites.  The common duct measures 5.6 mm      .  Limited images of the right kidney are unremarkable.       Impression:       Unremarkable right upper quadrant ultrasound.     This report was finalized on 3/3/2017 9:21 AM by Mercedes Zhou M.D..    US Venous Doppler Lower Extremity Left (duplex) [[id number]] Collected:  03/03/17 1553     Updated:  03/03/17 1556    Narrative:       PROCEDURE: US VENOUS DOPPLER LOWER EXTREMITY LEFT (DUPLEX)-     HISTORY: edema left leg; N17.9-Acute kidney failure, unspecified;  R74.8-Abnormal levels of other serum enzymes; E87.5-Hyperkalemia;  Z74.09-Other reduced mobility; Z74.09-Other reduced mobility     PROCEDURE: Multiple transverse and longitudinal scans were performed of  the femoropopliteal deep venous system, with augmentation and  compression maneuvers.     FINDINGS: Normal phasic flow was noted in the visualized deep venous  system. No intraluminal increased echogenicity is noted to suggest  thrombus. There is normal compression and augmentation of the venous  structures. No abnormal venous collaterals are seen. No venous reflux is  demonstrated.       Impression:       No evidence of lower extremity DVT.     This report was finalized on 3/3/2017 3:54 PM by Mercedes Zhou M.D..          budesonide-formoterol 2 puff Inhalation BID - RT   buPROPion  mg Oral QAM   clopidogrel 75 mg Oral Daily   docusate sodium 100 mg Oral BID   enoxaparin 30 mg Subcutaneous Daily   escitalopram 10 mg Oral Daily   insulin aspart 0-7 Units Subcutaneous 4x Daily AC & at Bedtime   insulin detemir 20 Units Subcutaneous Nightly   ipratropium-albuterol 3 mL Nebulization Q6H - RT   lactulose 20 g Oral BID   levothyroxine 125 mcg Oral Q AM   metoprolol  tartrate 25 mg Oral BID   mirtazapine 45 mg Oral Nightly   pantoprazole 40 mg Oral BID   rOPINIRole 1 mg Oral Nightly       sodium chloride 100 mL/hr Last Rate: 100 mL/hr (03/03/17 2120)       Medication Review:   Current Facility-Administered Medications   Medication Dose Route Frequency Provider Last Rate Last Dose   • ALPRAZolam (XANAX) tablet 0.5 mg  0.5 mg Oral Q6H PRN Mariya Guzman DO   0.5 mg at 03/03/17 2250   • ammonium lactate (LAC-HYDRIN) 12 % lotion   Topical BID PRN Mariya Guzman DO       • bisacodyl (DULCOLAX) suppository 10 mg  10 mg Rectal Daily PRN Cezar Panda DO       • budesonide-formoterol (SYMBICORT) 160-4.5 MCG/ACT inhaler 2 puff  2 puff Inhalation BID - RT Cezar Panda DO   2 puff at 03/03/17 0701   • buPROPion XL (WELLBUTRIN XL) 24 hr tablet 150 mg  150 mg Oral QAM Cezar Panda DO   150 mg at 03/04/17 0629   • CHAPSTICK 1 each  1 each Apply externally 4x Daily PRN Mariya Guzman DO   1 each at 03/03/17 1151   • clopidogrel (PLAVIX) tablet 75 mg  75 mg Oral Daily Cezar Panda DO   75 mg at 03/03/17 0822   • dextrose (D50W) solution 25 g  25 g Intravenous Q15 Min PRN Cezar Panda DO       • dextrose (INSTA-GLUCOSE) 77.4 % gel 1 tube  1 tube Oral Q15 Min PRN Cezar Panda DO       • docusate sodium (COLACE) capsule 100 mg  100 mg Oral BID Cezar Panda DO   100 mg at 03/03/17 1706   • enoxaparin (LOVENOX) syringe 30 mg  30 mg Subcutaneous Daily Cezar Panda DO   30 mg at 03/03/17 0822   • escitalopram (LEXAPRO) tablet 10 mg  10 mg Oral Daily Cezar Panda DO   10 mg at 03/03/17 0822   • glucagon (human recombinant) (GLUCAGEN DIAGNOSTIC) injection 1 mg  1 mg Subcutaneous Q15 Min PRN Cezar Panda DO       • hydrOXYzine (VISTARIL) capsule 25 mg  25 mg Oral 4x Daily PRN Cezar Panda DO   25 mg at 03/03/17 1937   • insulin aspart (novoLOG) injection 0-7 Units  0-7 Units Subcutaneous 4x Daily AC & at Bedtime Cezar STODDARD  Amarilys, DO   2 Units at 03/03/17 2006   • insulin detemir (LEVEMIR) injection 20 Units  20 Units Subcutaneous Nightly Cezar Panda DO   20 Units at 03/03/17 2006   • ipratropium-albuterol (DUO-NEB) nebulizer solution 3 mL  3 mL Nebulization Q6H - RT Cezar Panda, DO   3 mL at 03/04/17 0055   • lactulose solution 20 g  20 g Oral BID Cezar Panda DO   20 g at 03/03/17 1706   • levothyroxine (SYNTHROID, LEVOTHROID) tablet 125 mcg  125 mcg Oral Q AM Cezar Panda, DO   125 mcg at 03/04/17 0629   • metoprolol tartrate (LOPRESSOR) tablet 25 mg  25 mg Oral BID Cezar Panda DO   25 mg at 03/03/17 1706   • mirtazapine (REMERON) tablet 45 mg  45 mg Oral Nightly Cezar Panda, DO   45 mg at 03/03/17 2006   • ondansetron (ZOFRAN) injection 4 mg  4 mg Intravenous Q6H PRN Cezar Panda DO       • pantoprazole (PROTONIX) EC tablet 40 mg  40 mg Oral BID Cezar Panda DO   40 mg at 03/03/17 1706   • polyethylene glycol (MIRALAX) powder 17 g  17 g Oral Daily PRN Cezar Panda, DO       • rOPINIRole (REQUIP) tablet 1 mg  1 mg Oral Nightly Cezar Panda, DO   1 mg at 03/03/17 2006   • sodium chloride 0.9 % flush 1-10 mL  1-10 mL Intravenous PRN Cezar Panda DO       • sodium chloride 0.9 % infusion  100 mL/hr Intravenous Continuous Mariya Guzman,  mL/hr at 03/03/17 2120 100 mL/hr at 03/03/17 2120       Assessment/Plan     1. Acute renal failure: It appears that she has multiple reasons including decreased volume as well as angiotensin receptor on board along with using of non-steroidal's. Most of these medicines were taken off continue hydration and reassess on day-to-day basis. Likely will settle to baseline.  It appears she likely had an ATN.  2. Anemia of chronic disease: Workup as ordered she may need to further GI workup, it appears she will need to be assessed by the gastroenterologist for abnormal liver function and probably will need EGD and colonscopy  done as well, she has low iron stores, she will benefit from IV iron.  If there is no signs of infection we will go ahead and start the iron infusions and which can be continued as an outpatient.  3. Chronic kidney disease (CKD) stage G3a/A1, moderately decreased glomerular filtration rate (GFR) between 45-59 mL/min/1.73 square meter and albuminuria creatinine ratio less than 30 mg/g: She has known to have baseline chronic kidney disease stage III does not have any proteinuria will go ahead and recheck UA again today.  4. Chronic coronary artery disease: She does not had any acute symptoms.  5. Chronic systolic CHF (congestive heart failure), NYHA class 3: Clinically appears to be stable.  I'll continue IV hydration but decreased the rate.  6. Controlled type 2 diabetes mellitus with circulatory disorder, with long-term current use of insulin: Continue current treatment plan.  7. Essential hypertension: It appears to be under fair control at this point depending on her blood pressure will have to be reassessed on day-to-day basis will hold off using any angiotensin receptor blocker at this point. She will likely benefit from it volume status is more stable.  8. Ischemic cardiomyopathy: Continue to optimize medications.  9. GERD (gastroesophageal reflux disease): Treated clinically appears to be stable  10. 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 attack: Home medications have been restarted.  11. Functional diarrhea: Currently does not complain of it.          Details were discussed with the patient as well as with the hospitalist service.    Rest as ordered    Dallas Adler MD  03/04/17  7:49 AM  Please note that portions of this note may have been completed with a voice recognition program. Efforts were made to edit the dictations, but occasionally words are mistranscribed.

## 2017-03-04 NOTE — CONSULTS
Referring provider:  No att. providers found    Primary care provider: Phuong Vera MD    Date of consultation:  3/11/2017    Reason for consultation : Abnormal liver function tests.    History:      This is a 72-year-old white female with history of multiple medical problems who presented to [facility] on March 2, 2017 with history of abdominal pain, and confusion off and on.  The patient was noted to have elevated liver function tests.    There is history of epigastric and periumbilical abdominal pain for the last 3 days.  The pain is mild to moderate in severity, gradual in onset and mostly localized.  The pain is aching and at times sharp in character.  Frequency being several times a day.  Each episode may last for several minutes.  The patient also admits to some background pain which is rather diffuse and aching in character for the last 3 days or so.  The patient cannot identify aggravating or relieving factors of abdominal pain.  There is history of some associated nausea.    The patient has been having some nausea for the last 2-3 days.  This is mild.  Frequency being 2-3 times a day.  The nausea is worsened by eating.  There is no vomiting.    The patient has history of recurrent reflux for the last several years.  Reflux is moderately severe.  Frequency being several times per week.  Symptoms are described as retrosternal burning sensation, and occasional regurgitative symptoms.  There is history of some indigestion at times as well.  The symptoms occur both day and night time.  The patient is taking acid suppressive therapy with reasonable control of her symptoms.    The patient has history of intermittent dysphagia for the last couple of years.  The dysphagia is mild.  The patient points towards neck area.  Dysphagic symptoms are associated with solid food.  Frequency perhaps couple of times a week.  There is no recent weight loss.    There is history of epigastric abdominal fullness  off and on.  There is an element of early satiety.  There is history of anemia.  There is no history of overt GI bleed-hematemesis, melena, or hematochezia.      There is history of constipation off and on for the last couple of years.  Constipation is mild to moderate.  Frequency of bowel movements perhaps 2-3 times a week.  The stools are described as hard.  There is no history of diarrhea.  The patient has taken some stool softeners.    The patient was noted to have abnormal liver function tests.  This is described as significant elevation of ALT and AST.  Upon review of records the patient was noted to have  and  on February 27, 2017.  However on presentation to [facility] she was found to have AST 7:30 and .  There is no history of darkening of urine color, lightning of stool color.  However the patient has significant pruritus perhaps for the last several days.  The patient is status post cholecystectomy years ago.  She also had acute pancreatitis in the past.  There is history suggestive of common duct stone for which she had undergone an ERCP perhaps in early 2000.       The patient has history of coronary artery disease requiring multiple interventions with PTCA and stents.  There is history of ischemic cardiomyopathy with left ventricle ejection fraction around 25%.  The patient has diastolic dysfunction as well.  She is status post ICD implant in 2003 with replacement of the battery and 2019.  There is history of valvular heart disease-mild MR, and TR.  There is history of trace TR.  The patient has history of hypertension and hyperlipidemia.  There is history of retrosternal discomfort off and on in the past.  She denies retrosternal pain more recently.  There is history of peripheral vascular disease.    There is history of COPD.  She is on home oxygen at 2 L nasal cannula.  No recent cough or sputum production.  No worsening of shortness of breath recently.  No  definite sleep apnea.    There is history of type 2 diabetes mellitus of long-standing duration.  The patient has history of hypothyroidism.  For which she is on replacement.    The patient has history of renal insufficiency in the past.  She has been evaluated by renal service in the past.  There is history of anemia.  The patient has history of depression and anxiety.  Lately there has been some confusion for the last several days.  There is no history of DVT or PE.  No previous blood transfusions.  No history of liver disease in the past.  There is no history of jaundice.     Upon review of records the patient had undergone a colonoscopy to terminal ileum on January 4, 2015 for anemia and Hemoccult-positive stools.  Which revealed diverticulosis involving the left colon and to a lesser degree in the transverse colon.  She was found to have multiple colon polyps, and internal hemorrhoids.  Her [place] prep score was 7.  The colonoscopy did not explain the cause of anemia and occult GI bleed.  Later on January 6, 2015 the patient underwent an upper endoscopy which revealed erythematous gastritis and a small sliding hiatal hernia less than 3 cm.  She was found to have changes consistent with delayed gastric emptying (significant amount of partly digested food residue within the stomach, patient being nothing by mouth for over 10 hours, and no gastric outlet obstruction).  The examination was limited due to presence of significant amount of partly digested food residue.  The patient was treated with low-dose Reglan.     Upon presentation to the [facility] emergency room the patient was was found to have renal failure, and hyperkalemia.  The patient was noted to significant elevation of creatinine when compared to February 2017 (1.2 vs 3.2). Her potassium was 6.1.  The patient has received calcium gluconate.  There is history of significant depression and anxiety.        Past Medical History   Diagnosis  Date   • Anemia    • Anxiety    • Arthritis    • Bacterial sinusitis    • Chronic back pain    • Chronic obstructive lung disease    • Colon cancer screening    • Colon polyps       · Last Impression: 09 Dec 2015  History of multiple colon polyps including tubularadenomata.  Maximino Fields (Gastroenterology)   • Constipation    • Depression    • Diarrhea    • Disease of thyroid gland    • Dysphagia 01/20/2013   • Esophageal reflux    • Esophagitis, reflux    • Hernia    • History of bronchitis    • History of cardiac pacemaker    • History of degenerative disc disease    • History of tobacco abuse    • History of urinary tract infection    • Hyperlipidemia    • Hypothyroidism    • Myocardial infarction    • Nausea    • Occult blood in stools      Impression: 12/09/2015 - This could be multifactorial including secondary to small nasal bleed.  However the small nasal bleed does not explain anemia as such.  The patient previously had undergone a colonoscopy in February 2014.  However unfortunately the prep was considered to be suboptimal.;    • Pancreatitis      status post ERCP for common duct stone in 2001   • RLS (restless legs syndrome)        Past Surgical History   Procedure Laterality Date   • Back surgery  1992   • Colonoscopy  2009     Fiberoptic (Onset Date: 2009)   • Internal cardiac defibrillator insertion       Dual Lead Cardioverter-Defibrilator   • Coronary stent placement  1994   • Cardiac pacemaker placement     • Cardiac catheterization     • Pacemaker implantation  1994   • Cholecystectomy  1968   • Tonsillectomy  1955   • Hysterectomy  1998       Family History   Problem Relation Age of Onset   • Dementia Mother    • Pneumonia Mother    • COPD Father    • Emphysema Father    • Lung cancer Sister    • Cancer Sister    • COPD Sister        Social History   Substance Use Topics   • Smoking status: Former Smoker   • Smokeless tobacco: Never Used   • Alcohol use No         Review of Systems    Constitutional: Negative for appetite change, chills, fatigue, fever and unexpected weight change.   HENT: Positive for trouble swallowing. Negative for mouth sores and nosebleeds.    Eyes: Negative for discharge and redness.   Respiratory: Negative for apnea, cough and shortness of breath.    Cardiovascular: Negative for chest pain, palpitations and leg swelling.   Gastrointestinal: Positive for abdominal pain and constipation. Negative for abdominal distention, anal bleeding, blood in stool, diarrhea, nausea and vomiting.   Endocrine: Negative for cold intolerance, heat intolerance and polydipsia.   Genitourinary: Negative for dysuria, hematuria and urgency.   Musculoskeletal: Positive for arthralgias and myalgias. Negative for joint swelling.   Skin: Negative for rash.   Allergic/Immunologic: Negative for food allergies and immunocompromised state.   Neurological: Negative for dizziness, seizures, syncope and headaches.   Hematological: Negative for adenopathy. Does not bruise/bleed easily.   Psychiatric/Behavioral: Positive for confusion. Negative for dysphoric mood. The patient is not nervous/anxious and is not hyperactive.        Allergies   Allergen Reactions   • Amphetamine-Dextroamphetamine    • Lorazepam    • Other      No Known Food Allergy       No current facility-administered medications for this encounter.     Current Outpatient Prescriptions:   •  ALPRAZolam (XANAX) 0.5 MG tablet, Take 1 tablet by mouth 3 (Three) Times a Day As Needed (panic attack)., Disp: , Rfl:   •  ammonium lactate (LAC-HYDRIN) 12 % lotion, Apply  topically 2 (Two) Times a Day As Needed for dry skin. Apply liberally to affected area BID. Refill PRN up to 3 bottles/mo for 12 mo, Disp: 500 g, Rfl: 24  •  atorvastatin (LIPITOR) 80 MG tablet, Take 1 tablet by mouth Every Night., Disp: 30 tablet, Rfl: 0  •  budesonide-formoterol (SYMBICORT) 160-4.5 MCG/ACT inhaler, Inhale 2 puffs 2 (Two) Times a Day. Rinse mouth with water after  use., Disp: 3 inhaler, Rfl: 3  •  buPROPion XL (WELLBUTRIN XL) 150 MG 24 hr tablet, Take 1 tablet by mouth Every Morning., Disp: , Rfl:   •  clopidogrel (PLAVIX) 75 MG tablet, TAKE ONE TABLET BY MOUTH DAILY, Disp: 30 tablet, Rfl: 4  •  diphenoxylate-atropine (LOMOTIL) 2.5-0.025 MG per tablet, TAKE 2-3 TABS PO PRN DIARRHEA. (Patient taking differently: Pt. No longer taking), Disp: 30 tablet, Rfl: 2  •  escitalopram (LEXAPRO) 10 MG tablet, Take 1 tablet by mouth Daily., Disp: , Rfl:   •  furosemide (LASIX) 40 MG tablet, Take 0.5 tablets by mouth Every Other Day. If needed, Disp: 30 tablet, Rfl: 5  •  hydrocortisone 2.5 % cream, Apply  topically 2 (Two) Times a Day for 14 days. Apply to clean dry skin before other lotions. Do not use on face., Disp: 20 g, Rfl: 0  •  insulin glargine (LANTUS) 100 UNIT/ML injection, Inject 22 Units under the skin Every Night., Disp: 660 Units, Rfl: 3  •  ipratropium-albuterol (COMBIVENT RESPIMAT)  MCG/ACT inhaler, Inhale 1 puff 4 (Four) Times a Day As Needed for shortness of air., Disp: 3 g, Rfl: 3  •  levothyroxine (SYNTHROID, LEVOTHROID) 125 MCG tablet, TAKE ONE TABLET BY MOUTH DAILY, Disp: 30 tablet, Rfl: 4  •  lidocaine (LIDODERM) 5 %, Place 1 patch on the skin every day. (Patient taking differently: Place 1 patch on the skin Daily. Only uses PRN), Disp: 30 patch, Rfl: 1  •  melatonin 3 MG tablet, Take 1 tablet by mouth At Night As Needed for sleep. (Patient taking differently: Take 3 mg by mouth At Night As Needed for sleep. No longer taking), Disp: , Rfl:   •  metFORMIN XR (GLUCOPHAGE-XR) 750 MG 24 hr tablet, Take 1 tablet by mouth Daily With Breakfast., Disp: 30 tablet, Rfl: 2  •  metoclopramide (REGLAN) 5 MG tablet, Take 0.5 tablets by mouth 2 (Two) Times a Day., Disp: 90 tablet, Rfl: 0  •  metoprolol tartrate (LOPRESSOR) 25 MG tablet, Take 1 tablet by mouth 2 (Two) Times a Day., Disp: , Rfl:   •  mirtazapine (REMERON) 45 MG tablet, Take 1 tablet by mouth Every Night.,  "Disp: , Rfl:   •  pantoprazole (PROTONIX) 40 MG EC tablet, Take 1 tablet by mouth 2 (Two) Times a Day., Disp: 60 tablet, Rfl: 5  •  polyethylene glycol (MIRALAX) packet, Take 17 g by mouth Daily As Needed (constipation). (Patient taking differently: Take 17 g by mouth Daily As Needed (constipation). No longer taking), Disp: 850 g, Rfl: 0  •  promethazine (PHENERGAN) 12.5 MG tablet, Take 2 tablets by mouth Every 6 (Six) Hours As Needed for nausea or vomiting., Disp: 20 tablet, Rfl: 0  •  ranitidine (ZANTAC) 150 MG capsule, Take 1 capsule by mouth 2 (Two) Times a Day As Needed for indigestion or heartburn., Disp: 60 capsule, Rfl: 11  •  rOPINIRole (REQUIP) 1 MG tablet, Take 1 tablet by mouth Every Night. Take 1 hour before bedtime., Disp: , Rfl:   •  spironolactone (ALDACTONE) 25 MG tablet, Take 1 tablet by mouth Every Other Day. Take this medication on the days you DO NOT take lasix (furosimide), Disp: 30 tablet, Rfl: 2  •  tiotropium (SPIRIVA HANDIHALER) 18 MCG per inhalation capsule, Place 2 puffs into inhaler and inhale Daily., Disp: 90 capsule, Rfl: 3    Blood pressure 133/62, pulse 69, temperature 97.4 °F (36.3 °C), temperature source Oral, resp. rate 18, height 65\" (165.1 cm), weight 185 lb 6.4 oz (84.1 kg), SpO2 100 %.    Physical Exam   Constitutional: She is oriented to person, place, and time. She appears well-developed and well-nourished. No distress.   HENT:   Head: Normocephalic and atraumatic.   Right Ear: Hearing and external ear normal.   Left Ear: Hearing and external ear normal.   Nose: Nose normal.   Mouth/Throat: Oropharynx is clear and moist and mucous membranes are normal. Mucous membranes are not pale, not dry and not cyanotic. No oral lesions. No oropharyngeal exudate.   Eyes: Conjunctivae and EOM are normal. Right eye exhibits no discharge. Left eye exhibits no discharge. No scleral icterus.   Neck: Trachea normal. Neck supple. No JVD present. No edema present. No thyroid mass and no " thyromegaly present.   Cardiovascular: Normal rate, regular rhythm, S2 normal and normal heart sounds.  Exam reveals no gallop, no S3 and no friction rub.    No murmur heard.  Pulmonary/Chest: Effort normal and breath sounds normal. No respiratory distress. She has no wheezes. She has no rales. She exhibits no tenderness.   Abdominal: Soft. Normal appearance and bowel sounds are normal. She exhibits no distension, no ascites and no mass. There is no splenomegaly or hepatomegaly. There is tenderness (mild deep) in the right upper quadrant, left upper quadrant and left lower quadrant. There is no rigidity, no rebound and no guarding. No hernia.   Musculoskeletal: She exhibits no tenderness or deformity.   Some bruising was noted at the left chest-back.       Vascular Status -  Her exam exhibits no right foot edema. Her exam exhibits no left foot edema.  Lymphadenopathy:     She has no cervical adenopathy.        Left: No supraclavicular adenopathy present.   Neurological: She is alert and oriented to person, place, and time. She has normal strength. No cranial nerve deficit or sensory deficit. She exhibits normal muscle tone. Coordination normal.   Skin: No rash noted. She is not diaphoretic. No cyanosis. No pallor. Nails show no clubbing.   Psychiatric: She has a normal mood and affect. Her behavior is normal. Judgment and thought content normal.   Nursing note and vitals reviewed.    Stigmata of chronic liver disease: None.  Asterixis:  None.      Laboratory Tests:    Upon review of medical records:    Dated February 27, 2017 sodium 132 potassium 5.1 CO2 35 chloride 89 BUN 28 creatinine 1.2 and glucose 179.  Calcium 9.2 albumen 4.3.    4T bili 0.9.  Dated February 27, 2017 White count 8.7 hemoglobin 10.1 hematocrit 32.6 and MCV 94 platelet count 205K.  Date February 27, 2017 UA revealed negative blood, trace ketones, negative nitrite, and negative leukocyte esterase.    Dated March 2, 2017 sodium 1:30  potassium 6.1 chloride 92 CO2 27 BUN 73 creatinine 3.2 calcium 9.1 albumen 4.1T bili 1.4 AST 7:30  and alkaline phosphatase 166.  Dated March 2, 2017 white count 7.4 hemoglobin 10.2 hematocrit 31.8 and platelet count 221K.    Dated March 3, 2017 sodium 131 potassium 5.3 chloride 93 CO2 28 BUN 71 creatinine 3.1 and glucose 96.  Calcium 9.3 alkaline phosphatase 154   total bilirubin 1.3 and albumen 4.1.  Total protein 6.9.  TSH 1.370.  Serum iron 33 TIBC 423% 8 saturation 8.  Ferritin 78.  B12 greater than thousand.  Magnesium 1.8.  Lipase 153.  Dated March 3, 2017 white count 7.9.  Hemoglobin 9.9 hematocrit 31.3 and platelet count 254.  PT 20.1 INR 1.83.  Stool for occult blood negative.    Dated March 3, 2017 UA negative.  Leukocyte esterase and nitrite negative.  Blood trace.    Abdominal Imaging:    Upon review of medical records:    Dated February 27, 2017 CT of abdomen and pelvis without contrast revealed pleural thickening on the right lung base.  Lungs otherwise clear.  Heart size normal.  Liver, spleen, adrenals and pancreas appear normal on on and advanced imaging.  Aorta normal in caliber.  No free fluid or adenopathy.  No nephrolithiasis.  No hydronephrosis.  Appendix not identified.  Colonic diverticula without CT suggestion of diverticulitis.  Bladder unremarkable.    Date March 3, 2017 ultrasound of the right upper quadrant revealed liver normal in echogenicity.  Gallbladder absent.  No ascites.  CBD measured 5.6 mm.  Right kidney unremarkable.    Assessment:    1.  Abdominal pain.  2.  Abnormal liver function tests.  CBD is normal on ultrasound in the absence of gallbladder which makes distal common duct obstruction unlikely.  However, of interest that the patient has history of acute pancreatitis in the past.  She is status post cholecystectomy.  The patient also had undergone what appears to be an ERCP perhaps in early 2000.  3.  Constipation likely multifactorial.  4.  History  of confusion.  5.  Anemia.  Likely iron deficiency.  Normal ferritin likely representing an acute phase reactant.  Colonoscopy in January 2015 did not reveal the etiology of anemia.  6.  Renal insufficiency-acute renal failure.  7.  History of some nausea.  Improved.  Underlying delayed gastric emptying could be a potential cause.   8.  Pruritus.  Etiology unclear.  Overall liver enzyme picture is not cholestatic to explain pruritus.  Potential causes may include an allergic reaction and medications.  9.  Concerns regarding rhabdomyolysis.  10.  History of intermittent dysphagia.  Possible underlying esophageal dysmotility, or Schatzki's ring.  The patient had undergone an upper endoscopy in January 2015 however this was limited due to presence of significant amount of partly digested food residue within the stomach that was suggestive of delayed gastric emptying.    12.  Multiple medical problems including coronary artery disease, ischemic cardiomyopathy, CHF, COPD, depression and anxiety.    Discussion:     Discussed with the daughter on the phone at length including history, findings and workup.     Plan:  1.  Noninvasive liver workup including acute viral, and autoimmune markers as well as alpha-1 antitrypsin level and phenotype.  2.  Supportive care.  3.  IV fluids.  4.  The patient will benefit from evaluation by Nephrology service.  5.  Acid suppressive therapy.    6.  Check CPK.    Thank you very much for letting me participate in the care of this patient.  Please do not hesitate to call me if you have any questions.  Time spent : Over 70 minutes.    Maximino Fields MD

## 2017-03-05 NOTE — PLAN OF CARE
Problem: Patient Care Overview (Adult)  Goal: Plan of Care Review  Outcome: Ongoing (interventions implemented as appropriate)    03/05/17 1000   Patient Care Overview   Progress progress toward functional goals as expected   Outcome Evaluation   Outcome Summary/Follow up Plan Pt progressing towards goals with decreased assist for transfers and increased distance amb          Problem: Inpatient Physical Therapy  Goal: Transfer Training Goal 1 LTG- PT  Outcome: Ongoing (interventions implemented as appropriate)    03/03/17 1201 03/05/17 1000   Transfer Training PT LTG   Transfer Training PT LTG, Date Established 03/03/17 --    Transfer Training PT LTG, Time to Achieve 2 wks --    Transfer Training PT LTG, Activity Type bed to chair /chair to bed;sit to stand/stand to sit --    Transfer Training PT LTG, [place] Level conditional independence --    Transfer Training PT LTG, Assist Device walker, rolling --    Transfer Training PT LTG, Outcome --  goal partially met       Goal: Gait Training Goal LTG- PT  Outcome: Ongoing (interventions implemented as appropriate)    03/03/17 1201 03/05/17 1000   Gait Training PT LTG   Gait Training Goal PT LTG, Date Established 03/03/17 --    Gait Training Goal PT LTG, Time to Achieve 2 wks --    Gait Training Goal PT LTG, [place] Level conditional independence --    Gait Training Goal PT LTG, Assist Device walker, rolling --    Gait Training Goal PT LTG, Distance to Achieve 200 --    Gait Training Goal PT LTG, Outcome --  goal ongoing       Goal: Strength Goal LTG- PT  Outcome: Ongoing (interventions implemented as appropriate)    03/03/17 1201 03/05/17 1000   Strength Goal PT LTG   Strength Goal PT LTG, Date Established 03/03/17 --    Strength Goal PT LTG, Time to Achieve 2 wks --    Strength Goal PT LTG, Measure to Achieve Patient will perform B LE ther ex x 15 reps to improve functional strength for mobility. --    Strength Goal PT LTG, Outcome --  goal ongoing

## 2017-03-05 NOTE — PROGRESS NOTES
Acute Care - Physical Therapy Treatment Note   [place]     Patient Name: Yudy Vásquez  : 1945  MRN: [id number]  Today's Date: 3/5/2017  Onset of Illness/Injury or Date of Surgery Date: 17  Date of Referral to PT: 17  Referring Physician: Dr. Panda    Admit Date: 3/2/2017    Visit Dx:    ICD-10-CM ICD-9-CM   1. Acute renal failure, unspecified acute renal failure type N17.9 584.9   2. Elevated liver enzymes R74.8 790.5   3. Hyperkalemia E87.5 276.7   4. Impaired functional mobility, balance, gait, and endurance Z74.09 V49.89   5. Impaired mobility and ADLs Z74.09 799.89     Patient Active Problem List   Diagnosis   • Anemia of chronic disease   • Chronic coronary artery disease   • Chronic systolic CHF (congestive heart failure), NYHA class 3   • Colon polyp   • Decreased hearing   • Chronic hypoxemic respiratory failure   • Osteopenia   • Controlled type 2 diabetes mellitus with circulatory disorder, with long-term current use of insulin   • Essential hypertension   • Psychophysiological insomnia   • Ischemic cardiomyopathy   • GERD (gastroesophageal reflux disease)   • Moderate episode of recurrent major depressive disorder   • 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 attack   • Hypothyroidism due to acquired atrophy of thyroid   • COPD, severe   • PAD (peripheral artery disease)   • Moderate to severe pulmonary hypertension   • Functional diarrhea   • Delayed gastric emptying   • RLS (restless legs syndrome)   • Low bone density   • Community acquired pneumonia   • Acute renal failure   • Chronic kidney disease (CKD) stage G3a/A1, moderately decreased glomerular filtration rate (GFR) between 45-59 mL/min/1.73 square meter and albuminuria creatinine ratio less than 30 mg/g               Adult Rehabilitation Note       17 1000 17 1000       Rehab Assessment/Intervention    Discipline physical therapy assistant  -CC      Document Type therapy note (daily  note)  -CC      Subjective Information agree to therapy;complains of;fatigue  -CC      Patient Effort, Rehab Treatment adequate  -CC      Treatment Not Performed  patient/family decline treatment, pt not feeling well  -CK     Treatment Not Performed, Comment  --   pt reports she is severely nauseated, nsg aware  -CK     Recorded by [CC] Mayra Wise PTA [CK] Saniya Hdz PTA     Vital Signs    O2 Delivery Pre Treatment supplemental O2   3 L  -CC      O2 Delivery Intra Treatment supplemental O2   3 L  -CC      O2 Delivery Post Treatment supplemental O2   3L  -CC      Recorded by [CC] Mayra Wise PTA      Pain Assessment    Pain Assessment 0-10  -CC      Pain Score 4  -CC      Post Pain Score 4  -CC      Pain Type Acute pain  -CC      Pain Location Abdomen  -CC      Pain Intervention(s) Repositioned;Medication (See MAR);Ambulation/increased activity  -CC      Recorded by [CC] Mayra Wise PTA      Vision Assessment/Intervention    Visual Impairment WFL with corrective lenses  -CC      Recorded by [CC] Mayra Wise PTA      Cognitive Assessment/Intervention    Personal Safety Interventions gait belt;nonskid shoes/slippers when out of bed;fall prevention program maintained  -CC      Recorded by [CC] Mayra Wise PTA      Bed Mobility, Assessment/Treatment    Bed Mob, Supine to Sit, Keith independent  -CC      Recorded by [CC] Mayra Wise PTA      Transfer Assessment/Treatment    Transfers, Bed-Chair [place] contact guard assist;stand by assist;verbal cues required  -CC      Transfers, Bed-Chair-Bed, Assist Device rolling walker  -CC      Transfers, Sit-Stand [place] contact guard assist;stand by assist;verbal cues required  -CC      Transfers, Stand-Sit Keith contact guard assist;stand by assist;verbal cues required  -CC      Transfers, Sit-Stand-Sit, Assist Device rolling walker  -CC      Transfer, Safety Issues balance decreased during turns;step length  decreased  -CC      Transfer, Impairments strength decreased;coordination impaired  -CC      Recorded by [CC] Mayra Wise PTA      Gait Assessment/Treatment    Gait, [place] Level contact guard assist;stand by assist;verbal cues required  -CC      Gait, Assistive Device rolling walker  -CC      Gait, Distance (Feet) 110  -CC      Gait, Gait Pattern Analysis swing-to gait  -CC      Gait, Gait Deviations karyn decreased;step length decreased  -      Gait, Safety Issues balance decreased during turns;step length decreased  -CC      Gait, Impairments strength decreased;coordination impaired  -CC      Recorded by [CC] Mayra Wise PTA      Therapy Exercises    Bilateral Lower Extremities AROM:;10 reps;supine;ankle pumps/circles;glut sets;heel slides;hip abduction/adduction;quad sets;SLR  -CC      Recorded by [CC] Mayra Wise PTA      Positioning and Restraints    Pre-Treatment Position in bed  -CC      Post Treatment Position chair  -CC      In Chair reclined;call light within reach;encouraged to call for assist;exit alarm on  -CC      Recorded by [CC] Mayra Wise PTA        User Key  (r) = Recorded By, (t) = Taken By, (c) = Cosigned By    Initials Name Effective Dates    CC Mayra Wise, PTA 10/26/16 -     CK Saniya Hdz, PTA 10/26/16 -                 IP PT Goals       03/05/17 1000 03/03/17 1201       Transfer Training PT LTG    Transfer Training PT LTG, Date Established  03/03/17  -LM     Transfer Training PT LTG, Time to Achieve  2 wks  -LM     Transfer Training PT LTG, Activity Type  bed to chair /chair to bed;sit to stand/stand to sit  -LM     Transfer Training PT LTG, [place] Level  conditional independence  -LM     Transfer Training PT LTG, Assist Device  walker, rolling  -LM     Transfer Training PT LTG, Outcome goal partially met  -CC goal ongoing  -LM     Gait Training PT LTG    Gait Training Goal PT LTG, Date Established  03/03/17  -LM     Gait Training Goal PT  LTG, Time to Achieve  2 wks  -LM     Gait Training Goal PT LTG, [place] Level  conditional independence  -LM     Gait Training Goal PT LTG, Assist Device  walker, rolling  -LM     Gait Training Goal PT LTG, Distance to Achieve  200  -LM     Gait Training Goal PT LTG, Outcome goal ongoing  -CC goal ongoing  -LM     Strength Goal PT LTG    Strength Goal PT LTG, Date Established  03/03/17  -LM     Strength Goal PT LTG, Time to Achieve  2 wks  -LM     Strength Goal PT LTG, Measure to Achieve  Patient will perform B LE ther ex x 15 reps to improve functional strength for mobility.  -LM     Strength Goal PT LTG, Outcome goal ongoing  -CC goal ongoing  -LM       User Key  (r) = Recorded By, (t) = Taken By, (c) = Cosigned By    Initials Name Provider Type    LM Jessy Monzon, PT Physical Therapist    CC Mayra Wise, YAJAIRA Physical Therapy Assistant          Physical Therapy Education     Title: PT OT SLP Therapies (Active)     Topic: Physical Therapy (Done)     Point: Mobility training (Done)    Learning Progress Summary    Learner Readiness Method Response Comment Documented by Status   Patient Acceptance E VU,NR Importance of increasing activity and ex daily btw therapy sessions as able and mobility  03/05/17 1254 Done    Acceptance E VU Purpose of PT/POC.  Proper use of RW for safe transfers/ambulation.  03/03/17 1200 Done               Point: Home exercise program (Done)    Learning Progress Summary    Learner Readiness Method Response Comment Documented by Status   Patient Acceptance E VU,NR Importance of increasing activity and ex daily btw therapy sessions as able and mobility  03/05/17 1254 Done    Acceptance E VU Purpose of PT/POC.  Proper use of RW for safe transfers/ambulation.  03/03/17 1200 Done                      User Key     Initials Effective Dates Name Provider Type Discipline     10/26/16 -  Jessy Monzon, PT Physical Therapist PT    CC 10/26/16 -  Mayra Wise PTA Physical Therapy  Assistant PT                    PT Recommendation and Plan  Anticipated Discharge Disposition: home with home health  Planned Therapy Interventions: balance training, bed mobility training, gait training, home exercise program, patient/family education, strengthening, transfer training  PT Frequency: daily  Plan of Care Review  Progress: progress toward functional goals as expected  Outcome Summary/Follow up Plan: Pt progressing towards goals with decreased assist for transfers and increased distance amb           Outcome Measures       03/05/17 1000 03/03/17 1015 03/03/17 1011    How much help from another person do you currently need...    Turning from your back to your side while in flat bed without using bedrails? 4  -CC  4  -LM    Moving from lying on back to sitting on the side of a flat bed without bedrails? 4  -CC  4  -LM    Moving to and from a bed to a chair (including a wheelchair)? 3  -CC  3  -LM    Standing up from a chair using your arms (e.g., wheelchair, bedside chair)? 3  -CC  3  -LM    Climbing 3-5 steps with a railing? 3  -CC  3  -LM    To walk in hospital room? 3  -CC  3  -LM    AM-PAC 6 Clicks Score 20  -CC  20  -LM    How much help from another is currently needed...    Putting on and taking off regular lower body clothing?  3  -AH     Bathing (including washing, rinsing, and drying)  3  -AH     Toileting (which includes using toilet bed pan or urinal)  4  -AH     Putting on and taking off regular upper body clothing  4  -AH     Taking care of personal grooming (such as brushing teeth)  4  -AH     Eating meals  4  -     Score  22  -     Functional Assessment    Outcome Measure Options AM-PAC 6 Clicks Basic Mobility (PT)  -CC AM-PAC 6 Clicks Daily Activity (OT)  - AM-PAC 6 Clicks Basic Mobility (PT)  -LM      User Key  (r) = Recorded By, (t) = Taken By, (c) = Cosigned By    Initials Name Provider Type    JAZMÍN Dailey Occupational Therapist    JORGE Monzon, PT Physical Therapist     CC Mayra Wise PTA Physical Therapy Assistant           Time Calculation:         PT Charges       03/05/17 1305          Time Calculation    Start Time 1000  -CC      PT Received On 03/05/17  -CC      PT Goal Re-Cert Due Date 03/13/17  -CC      Time Calculation- PT    Total Timed Code Minutes- PT 45 minute(s)  -CC        User Key  (r) = Recorded By, (t) = Taken By, (c) = Cosigned By    Initials Name Provider Type    CC Mayra Wise PTA Physical Therapy Assistant          Therapy Charges for Today     Code Description Service Date Service Provider Modifiers Qty    90349151276 HC GAIT TRAINING EA 15 MIN 3/5/2017 Mayra Wise PTA GP 1    38904524024 HC PT THERAPEUTIC ACT EA 15 MIN 3/5/2017 Mayra Wise PTA GP 1    65464186393 HC PT THER PROC EA 15 MIN 3/5/2017 Mayra Wise, YAJAIRA GP 1          PT G-Codes  Outcome Measure Options: AM-PAC 6 Clicks Basic Mobility (PT)    Mayra Wise PTA  3/5/2017

## 2017-03-05 NOTE — PROGRESS NOTES
SUBJECTIVE: Feels okay.  Still has some abdominal discomfort.  Overall abdominal abdominal pain has improved.  She denies nausea.  There is no emesis.  The patient has been passing flatus.  Her last bowel movement perhaps 3-4 days ago.  She continues to have itching on her arms.  The patient denies significant reflux.  Occasional dysphagia.  There is no overt GI bleed-hematemesis, melena or hematochezia.  The patient feels tired.  There is confusion at times.    UNDERLYING CHRONIC ILLNESS:  Depression and anxiety.    REVIEW OF SYSTEMS:    Constitutional: The patient denies fever or chills.  CNS: No recent seizure.  There is no recent stroke.  The patient denies dizziness.  Cardiovascular: No recent chest pains.  Denies palpitation.  Pulmonary: No recent worsening of shortness of breath.  The patient denies cough or sputum production.  Liver: No jaundice.  Rheumatologic: Complains of aches and pains.  Skin: Pruritus mostly on both arms.  Renal: Making urine.  Urine is somewhat dark.  No dysuria.  Psychiatric: History of significant depression and anxiety.  Currently feels okay.  Nutrition: Poor oral intake.  Activities: Staying in bed.    OBJECTIVE:  Alert and oriented ×3.  No apparent distress.  Vital signs:    Blood pressure 119/58.  Pulse 61.  Temperature 97.5 degrees.  Respiratory rate 16/m.  Weight 187 pounds.  24-hour in: 4190.  Out: 250    HEENT: Positive pallor.  No icterus.  Oral mucosa dry.  Neck: Supple.  No JVD.  No thyromegaly.    Chest: Clear to auscultation.  Cardiovascular: No S3.  No murmurs.  Abdomen: Soft.  Bowel sounds present.  There is mild tenderness in the right upper quadrant, epigastrium and adjacent left abdomen.  No guarding or rigidity.  No rebound tenderness.  No hepatosplenomegaly.  No ascites.  Extremities: Upper extremity bruising.  Trace edema lower extremities.  Rectal: Deferred.  Stigmata of chronic liver disease: None.  Neurological: No focal motor neurological  deficit.  Rheumatologic: No obvious joint swelling.    Laboratory Tests:   Upon review of medical records:     Dated February 27, 2017 sodium 132 potassium 5.1 CO2 35 chloride 89 BUN 28 creatinine 1.2 and glucose 179. Calcium 9.2 albumen 4.3.   4T bili 0.9.  Dated February 27, 2017 White count 8.7 hemoglobin 10.1 hematocrit 32.6 and MCV 94 platelet count 205K.  Date February 27, 2017 UA revealed negative blood, trace ketones, negative nitrite, and negative leukocyte esterase.     Dated March 2, 2017 sodium 1:30 potassium 6.1 chloride 92 CO2 27 BUN 73 creatinine 3.2 calcium 9.1 albumen 4.1T bili 1.4 AST 7:30  and alkaline phosphatase 166.  Dated March 2, 2017 white count 7.4 hemoglobin 10.2 hematocrit 31.8 and platelet count 221K.        Dated March 3, 2017 sodium 131 potassium 5.3 chloride 93 CO2 28 BUN 71 creatinine 3.1 and glucose 96. Calcium 9.3 alkaline phosphatase 154   total bilirubin 1.3 and albumen 4.1. Total protein 6.9. TSH 1.370. Serum iron 33 TIBC 423% 8 saturation 8. Ferritin 78. B12 greater than thousand. Magnesium 1.8. Lipase 153.  Dated March 3, 2017 white count 7.9. Hemoglobin 9.9 hematocrit 31.3 and platelet count 254. PT 20.1 INR 1.83. Stool for occult blood negative.     Dated March 3, 2017 UA negative. Leukocyte esterase and nitrite negative. Blood trace.    Dated March 4, 2017 sodium 132 potassium 4.9 chloride 94 CO2 27 BUN 68 creatinine 3.1 and glucose 167 calcium 8.3.  Dated March 4, 2017 white count 10.3.  Hemoglobin 10.5.  Platelet count 209 K.     Abdominal Imaging:   Upon review of medical records:     Dated February 27, 2017 CT of abdomen and pelvis without contrast revealed pleural thickening on the right lung base. Lungs otherwise clear. Heart size normal. Liver, spleen, adrenals and pancreas appear normal on on and advanced imaging. Aorta normal in caliber. No free fluid or adenopathy. No nephrolithiasis. No hydronephrosis. Appendix not identified.  Colonic diverticula without CT suggestion of diverticulitis. Bladder unremarkable.     Date March 3, 2017 ultrasound of the right upper quadrant revealed liver normal in echogenicity. Gallbladder absent. No ascites. CBD measured 5.6 mm. Right kidney unremarkable.     Assessment:     1. Abdominal pain etiology unclear.  Possible differential includes peptic ulcer disease.  2. Abnormal liver function tests. CBD is normal on ultrasound in the absence of gallbladder which makes distal common duct obstruction unlikely.  However of interest the patient has history of gallstones in the past.  She has history of acute pancreatitis and likely had undergone an ERCP in around 2000.  3. Constipation.  Multifactorial.  4. History of confusion.  Likely secondary to medications.  Unlikely hepatic encephalopathy.  5. Anemia. Likely iron deficiency. Normal ferritin likely representing an acute phase reactant.  6. Renal insufficiency with acute renal failure.    7. History of nausea.  Improved.  Possibly related to medications.  The patient has history of delayed gastric emptying.  8. Pruritus.  Possibly related to medications.  The patient does not have a cholestatic picture to explain significant pruritus.  9.  Underlying rhabdomyolysis.  10.  Multiple comorbid conditions including diabetes mellitus, coronary artery disease, congestive heart failure secondary to ischemic cardiomyopathy, COPD , depression and anxiety       Plan:  1. Noninvasive liver workup including acute viral, autoimmune markers, and alpha-1 antitrypsin level as well as phenotype.    2. Supportive care.  3. Cautious IV hydration.  5.  Acid suppressive therapy. \  6. Follow-up liver function tests.  7.  Follow-up the patient clinically.

## 2017-03-05 NOTE — PROGRESS NOTES
[facility]    [facility]    Name:  Yudy Vásquez   Age:  72 y.o.  Sex:  female  :  1945  MRN:  [id number]   Visit Number:  [id number]  Admission Date:  3/2/2017  Date Of Service:  17  Primary Care Physician:  Phuong Vera MD     LOS: 2 days :      Chief Complaint:  Cough. Follow up renal failure.         Subjective / Interval History:   The patient is much more awake, alert, communicative today. She complains of a mild cough and congestion and has been adequately hydrated today.  She is smiling.  She is generally weak still. She denies chest pain, abdominal pain, nausea, vomiting.  Her abdominal pain that was present on admission resolved with having several bowel movements.  Her liver enzymes are still elevated.  Further liver tests are still pending.  No acute events occurred overnight.  She did not have increased agitation last night.  She reports resting well and is feeling better today.    At home, patient has intermittent confusion with illness or hyperglycemia. . She was home alone during day while daughter works.           Review of Systems:     General ROS: Patient denies any fevers, chills or loss of consciousness. Generalized weakness  Ophthalmic ROS: Denies any diplopia or transient loss of vision.  ENT ROS: Denies sore throat, nasal congestion or ear pain.   Respiratory ROS: Mild cough without sputum or shortness of breath.  Cardiovascular ROS: Denies chest pain or palpitations. No history of exertional chest pain.   Gastrointestinal ROS: Denies nausea and vomiting. Denies any abdominal pain. No diarrhea. Constipation resolved   Genito-Urinary ROS: Denies dysuria or hematuria.  Musculoskeletal ROS: Denies back pain. No muscle pain. No calf pain.  Neurological ROS: Denies any focal weakness. No loss of consciousness. Denies any numbness. Denies neck pain.   Dermatological ROS: Denies any redness or pruritis.    Vital Signs:    Temp:  [97.2 °F  (36.2 °C)-98 °F (36.7 °C)] 98 °F (36.7 °C)  Heart Rate:  [49-63] 52  Resp:  [16-20] 16  BP: (104-130)/(52-76) 104/76    Intake and output:    I/O last 3 completed shifts:  In: 2289 [P.O.:360; I.V.:1929]  Out: 1600 [Urine:1600]  I/O this shift:  In: 240 [P.O.:240]  Out: 401 [Urine:400; Stool:1]    Physical Examination:    General Appearance:    Alert and cooperative, not in any acute distress.   Head:    Atraumatic and normocephalic, without obvious abnormality.   Eyes:            PERRLA, conjunctivae and sclerae normal, no Icterus. No pallor. Extra-occular movements are within normal limits.   Throat:   No oral lesions, no thrush, oral mucosa moist.   Neck:   Supple, trachea midline, no thyromegaly, no carotid bruit.   Lungs:     Chest shape is normal. Breath sounds heard reduced bilaterally equally. Trace bibasilar crackles with wheezing. No Pleural rub or bronchial breathing.    Heart:    Normal S1 and S2, no murmur, no gallop, no rub. No JVD   Abdomen:     Normal bowel sounds, no masses, no organomegaly. Soft        non-tender, non-distended, no guarding, no rebound                tenderness   Extremities:   Moves all extremities well, stable 1+= edema, no cyanosis, noclubbing   Skin:   No bleeding, bruising or rash.   Neurologic:   Cranial nerves 2 - 12 grossly intact, sensation intact, Motor power is normal and equal bilaterally.   Laboratory results:    Lab Results (last 24 hours)     Procedure Component Value Units Date/Time    Blood Gas, Arterial [[id number]]  (Abnormal) Collected:  03/04/17 1411    Specimen:  Arterial Blood Updated:  03/04/17 1411     Site Arterial Line      Ti's Test Positive      pH, Arterial 7.328 pH units      pCO2, Arterial 45.2 (H) mm Hg      pO2, Arterial 92.5 mm Hg      HCO3, Arterial 23.7 mmol/L      Base Excess, Arterial -2.3 mmol/L      O2 Saturation, Arterial 97.1 %      Hemoglobin, Blood Gas 9.9 (L) g/dL      Barometric Pressure for Blood Gas 746 mmHg      Modality Ambu bag       FIO2 21 %     Blood Gas, Arterial [[id number]]  (Abnormal) Collected:  03/04/17 1411    Specimen:  Arterial Blood Updated:  03/04/17 1413     Site Arterial: right radial      Ti's Test Positive      pH, Arterial 7.328 pH units      pCO2, Arterial 45.2 (H) mm Hg      pO2, Arterial 92.5 mm Hg      HCO3, Arterial 23.7 mmol/L      Base Excess, Arterial -2.3 mmol/L      O2 Saturation, Arterial 97.1 %      Hemoglobin, Blood Gas 9.9 (L) g/dL      Barometric Pressure for Blood Gas 746 mmHg      Modality Cannula - Nasal      FIO2 28 %     Potassium [[id number]]  (Normal) Collected:  03/04/17 1553    Specimen:  Blood Updated:  03/04/17 1622     Potassium 4.9 mmol/L     POC Glucose Fingerstick [[id number]]  (Normal) Collected:  03/04/17 1623    Specimen:  Blood Updated:  03/04/17 1646     Glucose 96 mg/dL       Serial Number: [id number]    : [id number]       POC Glucose Fingerstick [[id number]]  (Normal) Collected:  03/04/17 1922    Specimen:  Blood Updated:  03/04/17 1950     Glucose 104 mg/dL       Serial Number: [id number]    : [id number]       Potassium [[id number]]  (Normal) Collected:  03/04/17 2123    Specimen:  Blood Updated:  03/04/17 2157     Potassium 5.0 mmol/L     Potassium [[id number]]  (Abnormal) Collected:  03/05/17 0017    Specimen:  Blood Updated:  03/05/17 0049     Potassium 5.5 (H) mmol/L     POC Glucose Fingerstick [[id number]]  (Normal) Collected:  03/05/17 0604    Specimen:  Blood Updated:  03/05/17 0630     Glucose 100 mg/dL       Serial Number: [id number]    : [id number]       CBC & Differential [[id number]] Collected:  03/05/17 0640    Specimen:  Blood Updated:  03/05/17 0657    Narrative:       The following orders were created for panel order CBC & Differential.  Procedure                               Abnormality         Status                     ---------                               -----------         ------                     CBC Auto Differential[[id number]]         Abnormal             Final result                 Please view results for these tests on the individual orders.    CBC Auto Differential [[id number]]  (Abnormal) Collected:  03/05/17 0640    Specimen:  Blood Updated:  03/05/17 0657     WBC 8.86 10*3/mm3      RBC 3.78 (L) 10*6/mm3      Hemoglobin 10.9 (L) g/dL      Hematocrit 34.9 (L) %      MCV 92.3 fL      MCH 28.8 pg      MCHC 31.2 g/dL      RDW 17.5 (H) %      RDW-SD 59.0 (H) fl      MPV 11.2 fL      Platelets 193 10*3/mm3      Neutrophil % 76.9 %      Lymphocyte % 10.0 %      Monocyte % 11.5 %      Eosinophil % 1.0 %      Basophil % 0.3 %      Immature Grans % 0.3 %      Neutrophils, Absolute 6.80 10*3/mm3      Lymphocytes, Absolute 0.89 10*3/mm3      Monocytes, Absolute 1.02 (H) 10*3/mm3      Eosinophils, Absolute 0.09 10*3/mm3      Basophils, Absolute 0.03 10*3/mm3      Immature Grans, Absolute 0.03 10*3/mm3      nRBC 0.0 /100 WBC     CK [[id number]]  (Abnormal) Collected:  03/05/17 0640    Specimen:  Blood Updated:  03/05/17 0707     Creatine Kinase 532 (H) U/L     Potassium [[id number]]  (Abnormal) Collected:  03/05/17 0640    Specimen:  Blood Updated:  03/05/17 0708     Potassium 5.5 (H) mmol/L       Specimen hemolyzed.  Results may be affected.       Phosphorus [[id number]]  (Abnormal) Collected:  03/05/17 0640    Specimen:  Blood Updated:  03/05/17 0708     Phosphorus 5.4 (H) mg/dL       Specimen hemolyzed.  Results may be affected.       Comprehensive Metabolic Panel [[id number]]  (Abnormal) Collected:  03/05/17 0640    Specimen:  Blood Updated:  03/05/17 0716     Glucose 96 mg/dL      BUN 66 (H) mg/dL       Specimen hemolyzed. Results may be affected.        Creatinine 2.50 (H) mg/dL      Sodium 133 (L) mmol/L      Potassium 5.5 (H) mmol/L       Specimen hemolyzed.  Results may be affected.        Chloride 100 mmol/L      CO2 26.0 mmol/L      Calcium 8.4 mg/dL      Total Protein 6.5 g/dL      Albumin 3.50 g/dL      ALT (SGPT) 1063 (C) U/L      AST (SGOT) 980 (C) U/L      Alkaline  Phosphatase 205 (H) U/L       Specimen hemolyzed. Results may be affected.        Total Bilirubin 1.8 (H) mg/dL      eGFR Non African Amer 19 (L) mL/min/1.73      Globulin 3.0 gm/dL      A/G Ratio 1.2 g/dL      BUN/Creatinine Ratio 26.4 (H)      Anion Gap 12.5 mmol/L     Narrative:       The MDRD GFR formula is only valid for adults with stable renal function between ages 18 and 70.    Alpha - 1 - Antitrypsin [[id number]] Collected:  03/04/17 0400    Specimen:  Blood Updated:  03/05/17 0736     A-1 Antitrypsin 149 mg/dL     Narrative:       Performed at:  [address] [place] [address]  [id number]  : Yang Duke PhD, Phone:  [phone]    Protime-INR [[id number]]  (Abnormal) Collected:  03/05/17 0640    Specimen:  Blood Updated:  03/05/17 0811     Protime 16.0 (H) Seconds      INR 1.46 (H)     Potassium [[id number]]  (Abnormal) Collected:  03/05/17 0844    Specimen:  Blood Updated:  03/05/17 0933     Potassium 5.4 (H) mmol/L     CK [[id number]]  (Abnormal) Collected:  03/05/17 0844    Specimen:  Blood Updated:  03/05/17 0933     Creatine Kinase 441 (H) U/L     POC Glucose Fingerstick [[id number]]  (Normal) Collected:  03/05/17 1059    Specimen:  Blood Updated:  03/05/17 1110     Glucose 89 mg/dL       Serial Number: [id number]    : [id number]       Potassium [[id number]]  (Abnormal) Collected:  03/05/17 1225    Specimen:  Blood Updated:  03/05/17 1250     Potassium 5.9 (C) mmol/L     Hepatitis A Antibody, IgM [[id number]] Collected:  03/04/17 0400    Specimen:  Blood Updated:  03/05/17 1321     Hep A IgM Negative     Narrative:       Performed at:  [address] [place] [address]  [id number]  : Yang Duke PhD, Phone:  [phone]    Hepatitis B Core Antibody, IgM [[id number]] Collected:  03/04/17 0400    Specimen:  Blood Updated:  03/05/17 1321     Hep B Core IgM Negative     Narrative:       Performed at:  [address] [place] [address]   [id number]  : Yang Duke PhD, Phone:  [phone]    Hepatitis B Core Antibody, Total [[id number]] Collected:  03/04/17 0400    Specimen:  Blood Updated:  03/05/17 1321     Hep B Core Total Ab Negative     Narrative:       Performed at:  [address] [place] [address]  [id number]  : Yang Duke PhD, Phone:  [phone]    Hepatitis B Surface Antigen [[id number]] Collected:  03/04/17 0400    Specimen:  Blood Updated:  03/05/17 1321     Hepatitis B Surface Ag Negative     Narrative:       Performed at:  [address] [place] [address]  [id number]  : Yang Duke PhD, Phone:  [phone]    Hepatitis B Surface Antibody [[id number]] Collected:  03/04/17 0400    Specimen:  Blood Updated:  03/05/17 1321     Hep B S Ab Non Reactive                     Non Reactive: Inconsistent with immunity,                              less than 10 mIU/mL                Reactive:     Consistent with immunity,                              greater than 9.9 mIU/mL       Narrative:       Performed at:  [address] [place] [address]  [id number]  : Ynag Duke PhD, Phone:  [phone]    Hepatitis C Antibody [[id number]] Collected:  03/04/17 0400    Specimen:  Blood Updated:  03/05/17 1321     Hep C Virus Ab <0.1 s/co ratio                                         Negative:     < 0.8                               Indeterminate: 0.8 - 0.9                                    Positive:     > 0.9   The CDC recommends that a positive HCV antibody result   be followed up with a HCV Nucleic Acid Amplification   test ([id number]).       Narrative:       Performed at:  [address] [place] [address]  [id number]  : Yang Duke PhD, Phone:  [phone]          I have reviewed the patient's laboratory results.    Radiology results:    Imaging Results (last 24 hours)     ** No results found for the last 24 hours. **           I have reviewed the patient's radiology reports.    Medication Review:     I have reviewed the patients active and prn medications.     Assessment:    Principal Problem:    Acute renal failure  Active Problems:    Anemia of chronic disease    Chronic coronary artery disease    Chronic systolic CHF (congestive heart failure), NYHA class 3    Chronic hypoxemic respiratory failure    Controlled type 2 diabetes mellitus with circulatory disorder, with long-term current use of insulin    Essential hypertension    Ischemic cardiomyopathy    GERD (gastroesophageal reflux disease)    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 attack    Functional diarrhea    Delayed gastric emptying    Chronic kidney disease (CKD) stage G3a/A1, moderately decreased glomerular filtration rate (GFR) between 45-59 mL/min/1.73 square meter and albuminuria creatinine ratio less than 30 mg/g  Bradycardia, stopped betablocker with renal failure for now        Plan:  Today, she will start diuretics. Creatinine improved. She is continue on cough and may have neb treatments as needed. Recommend rehab placement once stable for discharge, acute rehab. Her mood has stabilized today after alprazolam withdrawal has resolved. Her blood pressure has normalized. With bradycardia today, I have stopped her current metoprolol which will likely need restarted in near future. Continue telemetry monitoring. She is improved on 4 alprazolam daily and clonazepam at night for sleep. Medication risks and benefits discussed. JIMI reviewed.       Will try to contact daughter by phone, as she is working. Her phone number is : Helen  [phone]              Mariya Guzman DO  03/05/17  1:52 PM

## 2017-03-05 NOTE — PROGRESS NOTES
[facility]    [facility]    Name:  Yudy Vásquez   Age:  72 y.o.  Sex:  female  :  1945  MRN:  [id number]   Visit Number:  [id number]  Admission Date:  3/2/2017  Date Of Service:  17  Primary Care Physician:  Phuong Vera MD     LOS: 1 day :  Patient seen around 1000 today.     Chief Complaint:  Tired. Follow up renal failure and elevated liver enzymes.        Subjective / Interval History: The patient is sleepy today after being awake all night, confused. Daughter called on phone and reports she has had worse confusion in the past, but has intermittent confusion with illness and in past with hyperglycemia. She is occasionally forgetful at home and has positive family history of dementia. She was home alone during day while daughter works.    She is having bowel movement and no further abdominal pain.    The patient has little to day today, but answered appropriately but just not as talkative. Daughter reports that is usually a normal response with intermittent depression, like she has at home. She often is awake at night and sleeps during day at home, per daughter.  It is uncertain if patient was taking any of her medications correctly at home during the day.     The patient denied chest pain, shortness of breath, abdominal pain.       Review of Systems:     General ROS: Patient denies any fevers, chills or loss of consciousness.Tired today after being awake last night.  Ophthalmic ROS: Denies any diplopia or transient loss of vision.  ENT ROS: Denies sore throat, nasal congestion or ear pain.   Respiratory ROS: Denies cough or shortness of breath.  Cardiovascular ROS: Denies chest pain or palpitations. No history of exertional chest pain.   Gastrointestinal ROS: Denies nausea and vomiting. Denies any further abdominal pain. No diarrhea or constipation.  Genito-Urinary ROS: Denies dysuria or hematuria.  Musculoskeletal ROS: Denies back pain. No muscle pain.  No calf pain.  Neurological ROS: Denies any focal weakness. No loss of consciousness. Denies any numbness. Denies neck pain.   Dermatological ROS: Denies any redness or pruritis.  Psyc: agitated and awake all night.     Vital Signs:    Temp:  [97.2 °F (36.2 °C)-97.7 °F (36.5 °C)] 97.2 °F (36.2 °C)  Heart Rate:  [53-63] 53  Resp:  [16] 16  BP: (111-134)/(58-75) 111/66    Intake and output:    I/O last 3 completed shifts:  In: 4310 [P.O.:360; I.V.:3950]  Out: 750 [Urine:750]       Physical Examination:    General Appearance:    Alert and cooperative, not in any acute distress.   Head:    Atraumatic and normocephalic, without obvious abnormality.   Eyes:            PERRLA, conjunctivae and sclerae normal, no Icterus. No pallor. Extra-occular movements are within normal limits.   Throat:   No oral lesions, no thrush, oral mucosa moist.   Neck:   Supple, trachea midline, no thyromegaly, no carotid bruit.   Lungs:     Chest shape is normal. Breath sounds heard bilaterally equally.  No crackles or wheezing. No Pleural rub or bronchial breathing.    Heart:    Normal S1 and S2, no murmur, no gallop, no rub. No JVD   Abdomen:     Normal bowel sounds, no masses, no organomegaly. Soft        non-tender, non-distended, no guarding, no rebound                tenderness   Extremities:   Moves all extremities well, trace bilateral edema, no cyanosis, no clubbing   Skin:   No bleeding, bruising or rash.   Neurologic:   Cranial nerves 2 - 12 grossly intact, sensation intact, Motor power is normal and equal bilaterally.   Laboratory results:    Lab Results (last 24 hours)     Procedure Component Value Units Date/Time    CK [[id number]]  (Abnormal) Collected:  03/03/17 2006    Specimen:  Blood Updated:  03/03/17 2248     Creatine Kinase 1314 (H) U/L     Potassium [[id number]]  (Abnormal) Collected:  03/04/17 0007    Specimen:  Blood Updated:  03/04/17 0025     Potassium 5.4 (H) mmol/L     Protime-INR [[id number]]  (Abnormal) Collected:   03/04/17 0007    Specimen:  Blood Updated:  03/04/17 0030     Protime 17.1 (H) Seconds      INR 1.56 (H)     Hepatitis A Antibody, IgM [[id number]] Collected:  03/04/17 0400    Specimen:  Blood Updated:  03/04/17 0448    Hepatitis B Core Antibody, IgM [[id number]] Collected:  03/04/17 0400    Specimen:  Blood Updated:  03/04/17 0448    Hepatitis B Surface Antigen [[id number]] Collected:  03/04/17 0400    Specimen:  Blood Updated:  03/04/17 0448    Hepatitis B Surface Antibody [[id number]] Collected:  03/04/17 0400    Specimen:  Blood Updated:  03/04/17 0448    Hepatitis C Antibody [[id number]] Collected:  03/04/17 0400    Specimen:  Blood Updated:  03/04/17 0448    Mitochondrial Antibodies, M2 [[id number]] Collected:  03/04/17 0400    Specimen:  Blood Updated:  03/04/17 0448    Hepatitis B Core Antibody, Total [[id number]] Collected:  03/04/17 0400    Specimen:  Blood Updated:  03/04/17 0448    Anti-Smooth Muscle Antibody Titer [[id number]] Collected:  03/04/17 0400    Specimen:  Blood Updated:  03/04/17 0448    Alpha - 1 - Antitrypsin [[id number]] Collected:  03/04/17 0400    Specimen:  Blood Updated:  03/04/17 0448    Hepatitis A Antibody, Total [[id number]] Collected:  03/04/17 0400    Specimen:  Blood Updated:  03/04/17 0448    [facility] Comprehensive Panel [[id number]] Collected:  03/04/17 0400    Specimen:  Blood Updated:  03/04/17 0448    CBC (No Diff) [[id number]]  (Abnormal) Collected:  03/04/17 0400    Specimen:  Blood Updated:  03/04/17 0459     WBC 8.50 10*3/mm3      RBC 3.63 (L) 10*6/mm3      Hemoglobin 10.5 (L) g/dL      Hematocrit 33.4 (L) %      MCV 92.0 fL      MCH 28.9 pg      MCHC 31.4 g/dL      RDW 17.7 (H) %      RDW-SD 59.7 (H) fl      MPV 10.8 fL      Platelets 209 10*3/mm3     Potassium [[id number]]  (Normal) Collected:  03/04/17 0400    Specimen:  Blood Updated:  03/04/17 0544     Potassium 4.9 mmol/L     Renal Function Panel [[id number]]  (Abnormal) Collected:  03/04/17 0400    Specimen:  Blood Updated:  03/04/17 0554      Glucose 167 (H) mg/dL      BUN 68 (H) mg/dL      Creatinine 3.10 (H) mg/dL      Sodium 132 (L) mmol/L      Potassium 4.9 mmol/L      Chloride 94 (L) mmol/L      CO2 27.0 mmol/L      Calcium 8.3 (L) mg/dL      Albumin 3.40 (L) g/dL      Phosphorus 5.7 (H) mg/dL      Anion Gap 15.9 mmol/L      BUN/Creatinine Ratio 21.9      eGFR Non African Amer 15 (L) mL/min/1.73     Narrative:       The MDRD GFR formula is only valid for adults with stable renal function between ages 18 and 70.    POC Glucose Fingerstick [[id number]]  (Abnormal) Collected:  03/04/17 0614    Specimen:  Blood Updated:  03/04/17 0651     Glucose 158 (H) mg/dL       Serial Number: [id number]    : [id number]       Hepatitis Panel, Acute [[id number]] Collected:  03/03/17 1206    Specimen:  Blood Updated:  03/04/17 0748     Hep A IgM Negative      Hepatitis B Surface Ag Negative      Hep B Core IgM Negative      Hep C Virus Ab <0.1 s/co ratio                                         Negative:     < 0.8                               Indeterminate: 0.8 - 0.9                                    Positive:     > 0.9   The CDC recommends that a positive HCV antibody result   be followed up with a HCV Nucleic Acid Amplification   test ([id number]).       Narrative:       Performed at:  [address] [place] [address]  [id number]  : Yang Duke PhD, Phone:  [phone]    Haptoglobin [[id number]] Collected:  03/03/17 1207    Specimen:  Blood Updated:  03/04/17 0820     Haptoglobin 139 mg/dL     Narrative:       Performed at:  [address] [place] [address]  [id number]  : Yang Duke PhD, Phone:  [phone]    Potassium [[id number]]  (Abnormal) Collected:  03/04/17 0811    Specimen:  Blood Updated:  03/04/17 0913     Potassium 5.2 (H) mmol/L     POC Glucose Fingerstick [[id number]]  (Abnormal) Collected:  03/04/17 1101    Specimen:  Blood Updated:  03/04/17 1110     Glucose 131 (H) mg/dL       Serial Number:  [id number]    : [id number]       Potassium [[id number]]  (Abnormal) Collected:  03/04/17 1219    Specimen:  Blood Updated:  03/04/17 1242     Potassium 5.2 (H) mmol/L     Blood Gas, Arterial [[id number]]  (Abnormal) Collected:  03/04/17 1411    Specimen:  Arterial Blood Updated:  03/04/17 1411     Site Arterial Line      Ti's Test Positive      pH, Arterial 7.328 pH units      pCO2, Arterial 45.2 (H) mm Hg      pO2, Arterial 92.5 mm Hg      HCO3, Arterial 23.7 mmol/L      Base Excess, Arterial -2.3 mmol/L      O2 Saturation, Arterial 97.1 %      Hemoglobin, Blood Gas 9.9 (L) g/dL      Barometric Pressure for Blood Gas 746 mmHg      Modality Ambu bag      FIO2 21 %     Blood Gas, Arterial [[id number]]  (Abnormal) Collected:  03/04/17 1411    Specimen:  Arterial Blood Updated:  03/04/17 1413     Site Arterial: right radial      Ti's Test Positive      pH, Arterial 7.328 pH units      pCO2, Arterial 45.2 (H) mm Hg      pO2, Arterial 92.5 mm Hg      HCO3, Arterial 23.7 mmol/L      Base Excess, Arterial -2.3 mmol/L      O2 Saturation, Arterial 97.1 %      Hemoglobin, Blood Gas 9.9 (L) g/dL      Barometric Pressure for Blood Gas 746 mmHg      Modality Cannula - Nasal      FIO2 28 %     Potassium [[id number]]  (Normal) Collected:  03/04/17 1553    Specimen:  Blood Updated:  03/04/17 1622     Potassium 4.9 mmol/L     POC Glucose Fingerstick [[id number]]  (Normal) Collected:  03/04/17 1623    Specimen:  Blood Updated:  03/04/17 1646     Glucose 96 mg/dL       Serial Number: [id number]    : [id number]       POC Glucose Fingerstick [[id number]]  (Normal) Collected:  03/04/17 1922    Specimen:  Blood Updated:  03/04/17 1950     Glucose 104 mg/dL       Serial Number: [id number]    : [id number]             I have reviewed the patient's laboratory results.    Radiology results:    Imaging Results (last 24 hours)     ** No results found for the last 24 hours. **          I have reviewed the patient's radiology  reports.    Medication Review:     I have reviewed the patients active and prn medications.     Assessment:    Principal Problem:    Acute renal failure  Active Problems:    Anemia of chronic disease    Chronic coronary artery disease    Chronic systolic CHF (congestive heart failure), NYHA class 3    Chronic hypoxemic respiratory failure    Controlled type 2 diabetes mellitus with circulatory disorder, with long-term current use of insulin    Essential hypertension    Ischemic cardiomyopathy    GERD (gastroesophageal reflux disease)    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 attack    Functional diarrhea    Delayed gastric emptying    Chronic kidney disease (CKD) stage G3a/A1, moderately decreased glomerular filtration rate (GFR) between 45-59 mL/min/1.73 square meter and albuminuria creatinine ratio less than 30 mg/g  Acute alprazolam withdrawal, present on admission, resolved        Plan:  Patient counseled to take medication as directed.       Discussed diagnoses and treatment plan with daughter via phone. She feels in current state patient is unable to return home alone. The daughter works during the day and cannot stay home with her and has to work.     Continue but lower iv fluid rate with underlying CHF. Continue to hydrate today as tolerated. Potassium improved. Discussed with Dr Fields and Dr Adler. Further liver tests pending. Continue hydroxzine prn itching. Sliding scale insulin continued and will increase levemir to control glucose. Continue alprazolam like she is taking at home and start clonazepam at night prn sleep/anxiety. Avoid valium with renal failure. Medication risks and benefits discussed. She will need rehab on discharge.          Mariya Guzman DO  03/04/17  8:50 PM

## 2017-03-05 NOTE — PROGRESS NOTES
"Nephrology Progress Note.    LOS: 2 days    Patient Care Team:  Phuong Vera MD as PCP - General    Chief Complaint:    Chief Complaint   Patient presents with   • Muscle Pain       Subjective   Patient seen and examined this morning.  Events from last night noted.  Patient denies having any fevers chills.  No nausea or vomiting no abdominal pain.  Denies any chest pain or shortness of breath, cough or sputum production.  There is no significant edema noted.  Patient also denies having new onset weakness of numbness of either extremity.  She does not think that she is any significantly better than yesterday.      Review of Systems:    The pertinent  ROS was done and it is noted above, rest  was negative.    Objective     Vital Signs  Visit Vitals   • /64 (BP Location: Right arm, Patient Position: Lying)   • Pulse (!) 49   • Temp 97.4 °F (36.3 °C) (Oral)   • Resp 18   • Ht 65\" (165.1 cm)   • Wt 189 lb 9.6 oz (86 kg)   • SpO2 99%   • BMI 31.55 kg/m2         I/O this shift:  In: -   Out: 1 [Stool:1]    Intake/Output Summary (Last 24 hours) at 03/05/17 1047  Last data filed at 03/05/17 0830   Gross per 24 hour   Intake   1289 ml   Output   1151 ml   Net    138 ml       Physical Exam:    General Appearance: alert, oriented x 3, no acute distress,   HEENT: pupils round and reactive to light, oral mucosa dry, extra occular movements intact.  Neck: supple, no JVD, trachea midline  Lungs: Clear to Auscultation she does have occasional basal crackles, unlabored breathing effort  Heart: RRR, normal S1 and S2, no S3, no rub  Abdomen: Obese soft, non-tender, no palpable bladder, present bowel sounds to auscultation  Extremities: Trace to 1+ edema, no cyanosis or clubbing.   Neuro: normal speech and mental status, grossly non focal.     Results Review:      Results from last 7 days  Lab Units 03/05/17  0844 03/05/17  0640 03/05/17  0017  03/04/17  0400  03/03/17  0606 03/02/17  2309   SODIUM mmol/L  --  133*  --   --  " 132*  --  131* 130*   POTASSIUM mmol/L 5.4* 5.5*  5.5* 5.5*  < > 4.9  4.9  < > 5.3* 6.1*   CHLORIDE mmol/L  --  100  --   --  94*  --  93* 92*   TOTAL CO2 mmol/L  --  26.0  --   --  27.0  --  28.0 27.0   BUN mg/dL  --  66*  --   --  68*  --  71* 73*   CREATININE mg/dL  --  2.50*  --   --  3.10*  --  3.10* 3.20*   CALCIUM mg/dL  --  8.4  --   --  8.3*  --  9.3 9.1   BILIRUBIN mg/dL  --  1.8*  --   --   --   --  1.3 1.4*   ALK PHOS U/L  --  205*  --   --   --   --  154* 166*   ALT (SGPT) U/L  --  1063*  --   --   --   --  904* 914*   AST (SGOT) U/L  --  980*  --   --   --   --  721* 730*   GLUCOSE mg/dL  --  96  --   --  167*  --  96 159*   < > = values in this interval not displayed.    Estimated Creatinine Clearance: 21.7 mL/min (by C-G formula based on Cr of 2.5).      Results from last 7 days  Lab Units 03/05/17  0640 03/04/17  0400 03/03/17  0606   MAGNESIUM mg/dL  --   --  1.8   PHOSPHORUS mg/dL 5.4* 5.7*  --      Results for ASHLIE VALENTIN (MRN [id number]) as of 3/4/2017 07:46   Ref. Range 3/3/2017 06:07   Iron Latest Ref Range: 37 - 181 mcg/dL 33 (L)   Ferritin Latest Ref Range: 11.10 - 264.00 ng/mL 78.60   Iron Saturation Latest Ref Range: 11 - 46 % 8 (L)   TIBC Latest Ref Range: 261 - 497 mcg/dL 423             Results from last 7 days  Lab Units 03/05/17  0640 03/04/17  0400 03/03/17  0514 03/02/17 2309 02/27/17  0242   WBC 10*3/mm3 8.86 8.50 7.93 7.45 8.70   HEMOGLOBIN g/dL 10.9* 10.5* 9.9* 10.2* 10.1*   PLATELETS 10*3/mm3 193 209 244 221 205     Results for ASHLIE VALENTIN (MRN [id number]) as of 3/4/2017 07:46   Ref. Range 3/3/2017 05:14   Reticulocyte % Latest Ref Range: 0.50 - 1.50 % 4.21 (H)       Results from last 7 days  Lab Units 03/05/17  0640 03/04/17  0007 03/02/17 2309   INR  1.46* 1.56* 1.83*         Imaging Results (last 24 hours)     Procedure Component Value Units Date/Time     Liver [[id number]] Collected:  03/03/17 0921     Updated:  03/03/17 0923    Narrative:        PROCEDURE: US LIVER-     HISTORY: elevated lfts; N17.9-Acute kidney failure, unspecified;  R74.8-Abnormal levels of other serum enzymes; E87.5-Hyperkalemia     PROCEDURE: Ultrasound images of the right upper quadrant were obtained.     FINDINGS: Limited images of the pancreas are unremarkable. The liver  parenchyma is normal in echogenicity. The gallbladder is surgically  absent.  There is no ascites.  The common duct measures 5.6 mm      .  Limited images of the right kidney are unremarkable.       Impression:       Unremarkable right upper quadrant ultrasound.     This report was finalized on 3/3/2017 9:21 AM by Mercedes Zhou M.D..    US Venous Doppler Lower Extremity Left (duplex) [[id number]] Collected:  03/03/17 1553     Updated:  03/03/17 1556    Narrative:       PROCEDURE: US VENOUS DOPPLER LOWER EXTREMITY LEFT (DUPLEX)-     HISTORY: edema left leg; N17.9-Acute kidney failure, unspecified;  R74.8-Abnormal levels of other serum enzymes; E87.5-Hyperkalemia;  Z74.09-Other reduced mobility; Z74.09-Other reduced mobility     PROCEDURE: Multiple transverse and longitudinal scans were performed of  the femoropopliteal deep venous system, with augmentation and  compression maneuvers.     FINDINGS: Normal phasic flow was noted in the visualized deep venous  system. No intraluminal increased echogenicity is noted to suggest  thrombus. There is normal compression and augmentation of the venous  structures. No abnormal venous collaterals are seen. No venous reflux is  demonstrated.       Impression:       No evidence of lower extremity DVT.     This report was finalized on 3/3/2017 3:54 PM by Mercedes Zhou M.D..          budesonide-formoterol 2 puff Inhalation BID - RT   buPROPion  mg Oral QAM   clopidogrel 75 mg Oral Daily   docusate sodium 100 mg Oral BID   enoxaparin 30 mg Subcutaneous Daily   escitalopram 10 mg Oral Daily   insulin aspart 0-7 Units Subcutaneous 4x Daily AC & at Bedtime   insulin  detemir 20 Units Subcutaneous Nightly   ipratropium-albuterol 3 mL Nebulization Q6H - RT   lactulose 20 g Oral BID   levothyroxine 125 mcg Oral Q AM   mirtazapine 45 mg Oral Nightly   neomycin-bacitracin-polymyxin  Topical Q8H   pantoprazole 40 mg Oral BID   rOPINIRole 1 mg Oral Nightly       sodium chloride 75 mL/hr Last Rate: 75 mL/hr (03/05/17 0620)       Medication Review:   Current Facility-Administered Medications   Medication Dose Route Frequency Provider Last Rate Last Dose   • ALPRAZolam (XANAX) tablet 0.5 mg  0.5 mg Oral Q6H PRN Mariya Guzman DO   0.5 mg at 03/05/17 0007   • ammonium lactate (LAC-HYDRIN) 12 % lotion   Topical BID PRN Mariya Guzman DO       • bisacodyl (DULCOLAX) suppository 10 mg  10 mg Rectal Daily PRN Cezar Panda DO       • budesonide-formoterol (SYMBICORT) 160-4.5 MCG/ACT inhaler 2 puff  2 puff Inhalation BID - RT Cezar Panda DO   2 puff at 03/05/17 0739   • buPROPion XL (WELLBUTRIN XL) 24 hr tablet 150 mg  150 mg Oral QAM Cezar Panda DO   150 mg at 03/05/17 0619   • CHAPSTICK 1 each  1 each Apply externally 4x Daily PRN Mariya Guzman DO   1 each at 03/03/17 1151   • clonazePAM (KlonoPIN) tablet 0.5 mg  0.5 mg Oral Nightly PRN Mariya Guzman DO   0.5 mg at 03/04/17 2212   • clopidogrel (PLAVIX) tablet 75 mg  75 mg Oral Daily Cezar Panda DO   75 mg at 03/05/17 0902   • dextrose (D50W) solution 25 g  25 g Intravenous Q15 Min PRN Cezar Panda DO       • dextrose (INSTA-GLUCOSE) 77.4 % gel 1 tube  1 tube Oral Q15 Min PRN Cezar Panda DO       • docusate sodium (COLACE) capsule 100 mg  100 mg Oral BID Cezar Panda DO   100 mg at 03/05/17 0902   • enoxaparin (LOVENOX) syringe 30 mg  30 mg Subcutaneous Daily Cezar Panda DO   30 mg at 03/05/17 0902   • escitalopram (LEXAPRO) tablet 10 mg  10 mg Oral Daily Cezar Panda DO   10 mg at 03/05/17 0902   • glucagon (human recombinant) (GLUCAGEN DIAGNOSTIC) injection 1 mg  1 mg  Subcutaneous Q15 Min PRN Cezar Panda, DO       • hydrOXYzine (VISTARIL) capsule 25 mg  25 mg Oral 4x Daily PRN Cezar Panda, DO   25 mg at 03/05/17 0007   • insulin aspart (novoLOG) injection 0-7 Units  0-7 Units Subcutaneous 4x Daily AC & at Bedtime Cezar Panda DO   2 Units at 03/03/17 2006   • insulin detemir (LEVEMIR) injection 20 Units  20 Units Subcutaneous Nightly Cezar Panda, DO   20 Units at 03/03/17 2006   • ipratropium-albuterol (DUO-NEB) nebulizer solution 3 mL  3 mL Nebulization Q6H - RT Cezar Panda, DO   3 mL at 03/05/17 0738   • lactulose solution 20 g  20 g Oral BID Cezar Panda, DO   20 g at 03/05/17 0902   • levothyroxine (SYNTHROID, LEVOTHROID) tablet 125 mcg  125 mcg Oral Q AM Cezar Panda, DO   125 mcg at 03/05/17 0619   • melatonin tablet 4.5 mg  4.5 mg Oral Nightly PRN Mariya Guzman, DO   4.5 mg at 03/04/17 2104   • mirtazapine (REMERON) tablet 45 mg  45 mg Oral Nightly Cezar Panda, DO   45 mg at 03/04/17 2105   • neomycin-bacitracin-polymyxin (NEOSPORIN) ointment   Topical Q8H Mariya Guzman, DO       • ondansetron (ZOFRAN) injection 4 mg  4 mg Intravenous Q6H PRN Cezar Panda DO       • ondansetron (ZOFRAN) injection 4 mg  4 mg Intravenous Q6H PRN Mariya Guzman, DO   4 mg at 03/05/17 0007   • pantoprazole (PROTONIX) EC tablet 40 mg  40 mg Oral BID Cezar Panda, DO   40 mg at 03/05/17 0902   • polyethylene glycol (MIRALAX) powder 17 g  17 g Oral Daily PRN Cezar Panda, DO   17 g at 03/04/17 1254   • rOPINIRole (REQUIP) tablet 1 mg  1 mg Oral Nightly Cezar Panda, DO   1 mg at 03/04/17 2105   • sodium chloride 0.9 % flush 1-10 mL  1-10 mL Intravenous PRN Cezar Panda DO       • sodium chloride 0.9 % infusion  75 mL/hr Intravenous Continuous Dallas Adler MD 75 mL/hr at 03/05/17 0620 75 mL/hr at 03/05/17 0620       Assessment/Plan     1. Acute renal failure: It appears that she has multiple reasons including  decreased volume as well as angiotensin receptor on board along with using of non-steroidal's. Most of these medicines were taken off continue hydration and reassess on day-to-day basis. Likely will settle to baseline.  It appears she likely had an ATN.  Today's of first day that she has improved renal function and urine output also is slightly better.  I will go ahead and start the diuretics will give 1 dose of 40 mg of Lasix today and then put her on 20 twice a day starting tomorrow that'll be an oral dose.  2. Anemia of chronic disease: Workup as ordered she may need to further GI workup, it appears she will need to be assessed by the gastroenterologist for abnormal liver function and probably will need EGD and colonscopy done as well, she has low iron stores, she will benefit from IV iron.  If there is no signs of infection we will go ahead and start the iron infusions and which can be continued as an outpatient.  3. Chronic kidney disease (CKD) stage G3a/A1, moderately decreased glomerular filtration rate (GFR) between 45-59 mL/min/1.73 square meter and albuminuria creatinine ratio less than 30 mg/g: She has known to have baseline chronic kidney disease stage III does not have any proteinuria will go ahead and recheck UA again today.  4. Abnormal liver functions: Gastroenterology evaluation is in progress,   5. Chronic coronary artery disease: She does not had any acute symptoms.  6. Chronic systolic CHF (congestive heart failure), NYHA class 3: Clinically appears to be stable.  I'll continue IV hydration but decreased the rate.  7. Controlled type 2 diabetes mellitus with circulatory disorder, with long-term current use of insulin: Continue current treatment plan.  8. Essential hypertension: It appears to be under fair control at this point depending on her blood pressure will have to be reassessed on day-to-day basis will hold off using any angiotensin receptor blocker at this point. She will likely benefit  from it volume status is more stable.  9. Ischemic cardiomyopathy: Continue to optimize medications.  10. GERD (gastroesophageal reflux disease): Treated clinically appears to be stable  11. 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 attack: Home medications have been restarted.  12. Functional diarrhea: Currently does not complain of it.          Details were discussed with the patient as well as with the hospitalist service.    Rest as ordered    Dallas Adler MD  03/05/17  10:47 AM  Please note that portions of this note may have been completed with a voice recognition program. Efforts were made to edit the dictations, but occasionally words are mistranscribed.

## 2017-03-05 NOTE — PROGRESS NOTES
"  SUBJECTIVE: Feels better.  Nausea has significantly improved.  Abdominal pain is also better.  The patient had a bowel movement yesterday.  There is no overt GI bleeding (hematemesis, melena, or hematochezia).  She denies reflux.  There is no dysphagia or odynophagia.  There is no diarrhea.    UNDERLYING CHRONIC ILLNESS:  Anxiety-depression.    REVIEW OF SYSTEMS:    Constitutional: The patient denies fever or chills.    CNS: Denies recent seizure.  There is no dizziness or recent stroke.  Cardiovascular: The patient denies recent chest pains.  Heart rate was noted to be around 49.  Pulmonary: The patient denies significant cough.  She denies shortness of breath.  Liver: No jaundice.  Rheumatologic: The patient has some arthralgias.  Skin: No recent rash.  Renal: No hematuria or dysuria.  Psychiatric : History of depression and anxiety.  Under reasonable control.  There is history of some confusion at times.  Nutrition: The patient is eating solid food.  However intake is still so-so.  Activities: Mostly in bed.      OBJECTIVE:    Alert and oriented ×3.  No apparent distress.    Vital signs:    Blood pressure 104/76, pulse 52, temperature 98 °F (36.7 °C), temperature source Oral, resp. rate 16, height 65\" (165.1 cm), weight 189 lb 9.6 oz (86 kg), SpO2 99 %.    HEENT: No icterus.  Appears to be somewhat pale.  Oral mucosa moist.    Neck: Supple.  No JVD.  No thyromegaly.  No cervical lymph nodes.  Chest: Clear to auscultation.  Cardiovascular: No S3 and no murmurs.  Abdomen: Soft.  Bowel sounds present.  Non-distended.  Nontender.  No voluntary guarding or rigidity.  No hepatosplenomegaly.  No clinical ascites.  No mass was felt.    Extremities: No edema.  Rectal:   Stigmata of chronic liver disease:   Neurological: No obvious focal motor neurological deficit.  Rheumatologic: No obvious joint swelling.    Laboratory tests:  Dated March 3, 2017 sodium 133 potassium 5.5 chloride 100 CO2 26 BUN 66 creatinine 2.5 " calcium 8.4 albumen 3.5 ALT 1063  end alkaline phosphatase 205.  Phosphorus 5.4.  PT 16.0 INR 1.46.  White count 8.8 hemoglobin 10.9 hematocrit 34.9 and platelet count 190 3K.  MCV 92.3.    Dated March 3, 2017 ultrasound of the right upper quadrant revealed normal echogenicity of the liver.  Gallbladder absent.  No ascites.  CBD measured 5.6 mm.  Right kidney unremarkable.    Assessment:  1.  Abnormal liver function tests.  Noninvasive workup is pending.  Differential includes viral hepatitis, autoimmune hepatitis, and drug-induced.  2.  History of rather vague periumbilical abdominal pain.  Improved.  3.  Constipation.  Multifactorial.  4.  Anemia.  Likely iron deficiency.  Normal ferritin likely representing an acute phase reactant.  5.  Nausea improved.  9.  Likely underlying rhabdomyolysis improving.  10.  Mild Hyperkalemia.  11.  Renal insufficiency.  12.  Mild hyperphosphatemia.      Recommendations:    1.  Await noninvasive liver workup.  2.  Supportive care.  3.  Continue with slow IV hydration.  4.  Follow-up liver function tests and electrolytes including potassium, magnesium and phosphate.  5.  Transfuse if hemoglobin less than 7.  6.  Discussed with the patient in detail.  Opportunity was given to ask questions.  7.  Acid suppressive therapy.

## 2017-03-05 NOTE — PLAN OF CARE
Problem: Patient Care Overview (Adult)  Goal: Plan of Care Review  Outcome: Ongoing (interventions implemented as appropriate)    03/05/17 0133   Coping/Psychosocial Response Interventions   Plan Of Care Reviewed With patient   Patient Care Overview   Progress improving   Outcome Evaluation   Outcome Summary/Follow up Plan Patient is more oriented now then the night before. She has had two bowel movements, and still reports itching.       Goal: Adult Individualization and Mutuality  Outcome: Ongoing (interventions implemented as appropriate)  Goal: Discharge Needs Assessment  Outcome: Ongoing (interventions implemented as appropriate)    Problem: Renal Failure/Kidney Injury, Acute (Adult)  Goal: Signs and Symptoms of Listed Potential Problems Will be Absent or Manageable (Renal Failure/Kidney Injury, Acute)  Outcome: Ongoing (interventions implemented as appropriate)    Problem: Fall Risk (Adult)  Goal: Identify Related Risk Factors and Signs and Symptoms  Outcome: Ongoing (interventions implemented as appropriate)  Goal: Absence of Falls  Outcome: Ongoing (interventions implemented as appropriate)

## 2017-03-05 NOTE — PLAN OF CARE
Problem: Patient Care Overview (Adult)  Goal: Plan of Care Review  Outcome: Ongoing (interventions implemented as appropriate)    03/05/17 6089   Coping/Psychosocial Response Interventions   Plan Of Care Reviewed With patient;daughter   Patient Care Overview   Progress improving   Outcome Evaluation   Outcome Summary/Follow up Plan pt less confused today, potassium still elevated, MD aware, IVF dc'd and new orders recvd for K+

## 2017-03-06 NOTE — NURSING NOTE
SWATI Teaching for renal failure with pt and pt's daughter instructed on what it is, causes, signs and symptoms and treatment with teachback

## 2017-03-06 NOTE — SIGNIFICANT NOTE
Pt refused d/t being to tired after sitting in chair for 3 hours today and after shower this afternoon

## 2017-03-06 NOTE — PLAN OF CARE
Problem: Patient Care Overview (Adult)  Goal: Plan of Care Review  Outcome: Ongoing (interventions implemented as appropriate)    03/06/17 1510   Coping/Psychosocial Response Interventions   Plan Of Care Reviewed With patient   Patient Care Overview   Progress no change         Problem: NPPV/CPAP (Adult)  Goal: Signs and Symptoms of Listed Potential Problems Will be Absent or Manageable (NPPV/CPAP)  Outcome: Ongoing (interventions implemented as appropriate)    03/06/17 1510   NPPV/CPAP   Problems Assessed (NPPV/CPAP) hypoxia/hypoxemia;skin breakdown   Problems Present (NPPV/CPAP) none

## 2017-03-06 NOTE — PLAN OF CARE
Problem: Patient Care Overview (Adult)  Goal: Plan of Care Review  Outcome: Ongoing (interventions implemented as appropriate)    03/06/17 0203   Coping/Psychosocial Response Interventions   Plan Of Care Reviewed With patient   Patient Care Overview   Progress no change         Problem: NPPV/CPAP (Adult)  Intervention: Assess/Manage Patient Tolerance of Noninvasive Ventilation    03/06/17 0203   Respiratory Interventions   Airway/Ventilation Management airway patency maintained;pulmonary hygiene promoted         Goal: Signs and Symptoms of Listed Potential Problems Will be Absent or Manageable (NPPV/CPAP)  Outcome: Ongoing (interventions implemented as appropriate)    03/06/17 0203   NPPV/CPAP   Problems Assessed (NPPV/CPAP) hypoxia/hypoxemia   Problems Present (NPPV/CPAP) hypoxia/hypoxemia

## 2017-03-06 NOTE — PLAN OF CARE
Problem: Patient Care Overview (Adult)  Goal: Plan of Care Review  Outcome: Ongoing (interventions implemented as appropriate)    03/06/17 5454   Coping/Psychosocial Response Interventions   Plan Of Care Reviewed With patient   Patient Care Overview   Progress no change   Outcome Evaluation   Outcome Summary/Follow up Plan Patient is more alert today. She is still complaining of itching. She has taken a shower today and sat up in chair.          Problem: Renal Failure/Kidney Injury, Acute (Adult)  Goal: Signs and Symptoms of Listed Potential Problems Will be Absent or Manageable (Renal Failure/Kidney Injury, Acute)  Outcome: Ongoing (interventions implemented as appropriate)    Problem: Fall Risk (Adult)  Goal: Identify Related Risk Factors and Signs and Symptoms  Outcome: Ongoing (interventions implemented as appropriate)

## 2017-03-06 NOTE — PROGRESS NOTES
"Nephrology Progress Note.    LOS: 3 days    Patient Care Team:  Phuong Vera MD as PCP - General    Chief Complaint:    Chief Complaint   Patient presents with   • Muscle Pain       Subjective   Patient seen and examined this morning.  Events from last night noted.  Patient denies having any fevers chills.  No nausea or vomiting no abdominal pain.  Denies any chest pain or shortness of breath, cough or sputum production.  There is no significant edema noted.  Patient also denies having new onset weakness of numbness of either extremity.  She does think that she is  significantly better than yesterday.      Review of Systems:    The pertinent  ROS was done and it is noted above, rest  was negative.    Objective     Vital Signs  Visit Vitals   • /59 (BP Location: Left arm, Patient Position: Sitting)   • Pulse 66   • Temp 98.4 °F (36.9 °C) (Oral)   • Resp 20   • Ht 65\" (165.1 cm)   • Wt 189 lb 9.6 oz (86 kg)   • SpO2 98%   • BMI 31.55 kg/m2              Intake/Output Summary (Last 24 hours) at 03/06/17 2200  Last data filed at 03/06/17 1645   Gross per 24 hour   Intake    240 ml   Output    550 ml   Net   -310 ml       Physical Exam:    General Appearance: alert, oriented x 3, no acute distress,   HEENT: pupils round and reactive to light, oral mucosa dry, extra occular movements intact.  Neck: supple, no JVD, trachea midline  Lungs: Clear to Auscultation she does have occasional basal crackles, unlabored breathing effort  Heart: RRR, normal S1 and S2, no S3, no rub  Abdomen: Obese soft, non-tender, no palpable bladder, present bowel sounds to auscultation  Extremities: Trace to 1+ edema, no cyanosis or clubbing.   Neuro: normal speech and mental status, grossly non focal.     Results Review:      Results from last 7 days  Lab Units 03/06/17  1721 03/06/17  1600 03/06/17  1155  03/05/17  0640  03/04/17  0400  03/03/17  0606   SODIUM mmol/L 138  --   --   --  133*  --  132*  --  131*   POTASSIUM mmol/L 4.1 3.9 " 4.0  < > 5.5*  5.5*  < > 4.9  4.9  < > 5.3*   CHLORIDE mmol/L 101  --   --   --  100  --  94*  --  93*   TOTAL CO2 mmol/L 28.0  --   --   --  26.0  --  27.0  --  28.0   BUN mg/dL 59*  --   --   --  66*  --  68*  --  71*   CREATININE mg/dL 2.00*  --   --   --  2.50*  --  3.10*  --  3.10*   CALCIUM mg/dL 8.2*  --   --   --  8.4  --  8.3*  --  9.3   BILIRUBIN mg/dL 1.0  --   --   --  1.8*  --   --   --  1.3   ALK PHOS U/L 165*  --   --   --  205*  --   --   --  154*   ALT (SGPT) U/L 713*  --   --   --  1063*  --   --   --  904*   AST (SGOT) U/L 399*  --   --   --  980*  --   --   --  721*   GLUCOSE mg/dL 264*  --   --   --  96  --  167*  --  96   < > = values in this interval not displayed.    Estimated Creatinine Clearance: 27.1 mL/min (by C-G formula based on Cr of 2).      Results from last 7 days  Lab Units 03/05/17  0640 03/04/17  0400 03/03/17  0606   MAGNESIUM mg/dL  --   --  1.8   PHOSPHORUS mg/dL 5.4* 5.7*  --      Results for ASHLIE VALENTIN (MRN [id number]) as of 3/4/2017 07:46   Ref. Range 3/3/2017 06:07   Iron Latest Ref Range: 37 - 181 mcg/dL 33 (L)   Ferritin Latest Ref Range: 11.10 - 264.00 ng/mL 78.60   Iron Saturation Latest Ref Range: 11 - 46 % 8 (L)   TIBC Latest Ref Range: 261 - 497 mcg/dL 423             Results from last 7 days  Lab Units 03/06/17  1721 03/05/17  0640 03/04/17  0400 03/03/17  0514 03/02/17  2309   WBC 10*3/mm3 6.29 8.86 8.50 7.93 7.45   HEMOGLOBIN g/dL 9.4* 10.9* 10.5* 9.9* 10.2*   PLATELETS 10*3/mm3 164 193 209 244 221     Results for ASHLIE VALENTIN (MRN [id number]) as of 3/4/2017 07:46   Ref. Range 3/3/2017 05:14   Reticulocyte % Latest Ref Range: 0.50 - 1.50 % 4.21 (H)       Results from last 7 days  Lab Units 03/05/17  0640 03/04/17  0007 03/02/17  2309   INR  1.46* 1.56* 1.83*         Imaging Results (last 24 hours)     Procedure Component Value Units Date/Time     Liver [[id number]] Collected:  03/03/17 0921     Updated:  03/03/17 0923    Narrative:        PROCEDURE: US LIVER-     HISTORY: elevated lfts; N17.9-Acute kidney failure, unspecified;  R74.8-Abnormal levels of other serum enzymes; E87.5-Hyperkalemia     PROCEDURE: Ultrasound images of the right upper quadrant were obtained.     FINDINGS: Limited images of the pancreas are unremarkable. The liver  parenchyma is normal in echogenicity. The gallbladder is surgically  absent.  There is no ascites.  The common duct measures 5.6 mm      .  Limited images of the right kidney are unremarkable.       Impression:       Unremarkable right upper quadrant ultrasound.     This report was finalized on 3/3/2017 9:21 AM by Mercedes Zhou M.D..    US Venous Doppler Lower Extremity Left (duplex) [[id number]] Collected:  03/03/17 1553     Updated:  03/03/17 1556    Narrative:       PROCEDURE: US VENOUS DOPPLER LOWER EXTREMITY LEFT (DUPLEX)-     HISTORY: edema left leg; N17.9-Acute kidney failure, unspecified;  R74.8-Abnormal levels of other serum enzymes; E87.5-Hyperkalemia;  Z74.09-Other reduced mobility; Z74.09-Other reduced mobility     PROCEDURE: Multiple transverse and longitudinal scans were performed of  the femoropopliteal deep venous system, with augmentation and  compression maneuvers.     FINDINGS: Normal phasic flow was noted in the visualized deep venous  system. No intraluminal increased echogenicity is noted to suggest  thrombus. There is normal compression and augmentation of the venous  structures. No abnormal venous collaterals are seen. No venous reflux is  demonstrated.       Impression:       No evidence of lower extremity DVT.     This report was finalized on 3/3/2017 3:54 PM by Mercedes Zhou M.D..          budesonide-formoterol 2 puff Inhalation BID - RT   buPROPion  mg Oral QAM   clopidogrel 75 mg Oral Daily   docusate sodium 100 mg Oral BID   enoxaparin 30 mg Subcutaneous Daily   escitalopram 10 mg Oral Daily   furosemide 20 mg Oral BID   hydrocortisone  Topical Q12H   insulin aspart 0-7  Units Subcutaneous 4x Daily AC & at Bedtime   insulin detemir 20 Units Subcutaneous Nightly   ipratropium-albuterol 3 mL Nebulization Q6H - RT   lactulose 20 g Oral BID   levothyroxine 125 mcg Oral Q AM   mirtazapine 45 mg Oral Nightly   neomycin-bacitracin-polymyxin  Topical Q8H   pantoprazole 40 mg Oral BID   rOPINIRole 1 mg Oral Nightly          Medication Review:   Current Facility-Administered Medications   Medication Dose Route Frequency Provider Last Rate Last Dose   • ALPRAZolam (XANAX) tablet 0.5 mg  0.5 mg Oral Q6H PRN Mariya Guzman DO   0.5 mg at 03/06/17 2150   • ammonium lactate (LAC-HYDRIN) 12 % lotion   Topical BID PRN Mariya Guzman DO       • bisacodyl (DULCOLAX) suppository 10 mg  10 mg Rectal Daily PRN Cezar Panda DO       • budesonide-formoterol (SYMBICORT) 160-4.5 MCG/ACT inhaler 2 puff  2 puff Inhalation BID - RT Cezar Panda DO   2 puff at 03/05/17 1905   • buPROPion XL (WELLBUTRIN XL) 24 hr tablet 150 mg  150 mg Oral QAM Cezar Panda DO   150 mg at 03/06/17 0633   • CHAPSTICK 1 each  1 each Apply externally 4x Daily PRN Mariya Guzman DO   1 each at 03/03/17 1151   • clonazePAM (KlonoPIN) tablet 0.5 mg  0.5 mg Oral Nightly PRN Mariya Guzman DO   0.5 mg at 03/04/17 2212   • clopidogrel (PLAVIX) tablet 75 mg  75 mg Oral Daily Cezar Panda DO   75 mg at 03/06/17 1023   • dextrose (D50W) solution 25 g  25 g Intravenous Q15 Min PRN Cezar Panda DO       • dextrose (INSTA-GLUCOSE) 77.4 % gel 1 tube  1 tube Oral Q15 Min PRN Cezar Panda DO       • docusate sodium (COLACE) capsule 100 mg  100 mg Oral BID Cezar Panda DO   100 mg at 03/06/17 1718   • enoxaparin (LOVENOX) syringe 30 mg  30 mg Subcutaneous Daily Cezar Panda DO   30 mg at 03/06/17 1026   • escitalopram (LEXAPRO) tablet 10 mg  10 mg Oral Daily Cezar Panda DO   10 mg at 03/06/17 1024   • furosemide (LASIX) tablet 20 mg  20 mg Oral BID Dallas Adler MD   20 mg at  03/06/17 1717   • glucagon (human recombinant) (GLUCAGEN DIAGNOSTIC) injection 1 mg  1 mg Subcutaneous Q15 Min PRN Cezar Panda, DO       • hydrocortisone 2.5 % cream   Topical Q12H Zbigniew Seaman MD       • hydrOXYzine (VISTARIL) capsule 25 mg  25 mg Oral 4x Daily PRN Cezar Panda, DO   25 mg at 03/06/17 1038   • insulin aspart (novoLOG) injection 0-7 Units  0-7 Units Subcutaneous 4x Daily AC & at Bedtime Cezar Panda DO   3 Units at 03/06/17 2145   • insulin detemir (LEVEMIR) injection 20 Units  20 Units Subcutaneous Nightly Cezar Panda DO   20 Units at 03/06/17 2145   • ipratropium-albuterol (DUO-NEB) nebulizer solution 3 mL  3 mL Nebulization Q6H - RT Cezar Panda DO   3 mL at 03/06/17 2025   • ipratropium-albuterol (DUO-NEB) nebulizer solution 3 mL  3 mL Nebulization Q4H PRN Mariya Guzman DO       • lactulose solution 20 g  20 g Oral BID Cezar Panda DO   20 g at 03/06/17 1717   • levothyroxine (SYNTHROID, LEVOTHROID) tablet 125 mcg  125 mcg Oral Q AM Cezar Panda, DO   125 mcg at 03/06/17 0632   • melatonin tablet 4.5 mg  4.5 mg Oral Nightly PRN Mariya Guzman DO   4.5 mg at 03/04/17 2104   • mirtazapine (REMERON) tablet 45 mg  45 mg Oral Nightly Cezar Panda, DO   45 mg at 03/06/17 2145   • neomycin-bacitracin-polymyxin (NEOSPORIN) ointment   Topical Q8H Mariya Guzman DO       • ondansetron (ZOFRAN) injection 4 mg  4 mg Intravenous Q6H PRN Cezar Panda DO       • ondansetron (ZOFRAN) injection 4 mg  4 mg Intravenous Q6H PRN Mariya Guzman, DO   4 mg at 03/05/17 0007   • pantoprazole (PROTONIX) EC tablet 40 mg  40 mg Oral BID Cezar Panda DO   40 mg at 03/06/17 1718   • polyethylene glycol (MIRALAX) powder 17 g  17 g Oral Daily PRN Cezar Panda, DO   17 g at 03/04/17 1254   • rOPINIRole (REQUIP) tablet 1 mg  1 mg Oral Nightly Cezar Panda DO   1 mg at 03/06/17 2592   • sodium chloride 0.9 % flush 1-10 mL  1-10 mL  Intravenous PRN Cezar Panda,            Assessment/Plan     1. Acute renal failure: Clinically doing better and renal function is also improving.  2. Anemia of chronic disease: Workup as ordered she may need to further GI workup, it appears she will need to be assessed by the gastroenterologist for abnormal liver function and probably will need EGD and colonscopy done as well, she has low iron stores, she will benefit from IV iron.  If there is no signs of infection we will go ahead and start the iron infusions and which can be continued as an outpatient.  3. Chronic kidney disease (CKD) stage G3a/A1, moderately decreased glomerular filtration rate (GFR) between 45-59 mL/min/1.73 square meter and albuminuria creatinine ratio less than 30 mg/g: She has known to have baseline chronic kidney disease stage III does have proteinuria.  4. Abnormal liver functions: Gastroenterology evaluation is in progress, Since her diuretics the liver function is improving.  5. Chronic coronary artery disease: She does not had any acute symptoms.  6. Chronic systolic CHF (congestive heart failure), NYHA class 3: Clinically appears to be stable.  I'll continue IV hydration but decreased the rate.  7. Controlled type 2 diabetes mellitus with circulatory disorder, with long-term current use of insulin: Continue current treatment plan.  8. Essential hypertension: It appears to be under fair control at this point depending on her blood pressure will have to be reassessed on day-to-day basis will hold off using any angiotensin receptor blocker at this point. She will likely benefit from it once the volume status is more stable.  9. Ischemic cardiomyopathy: Continue to optimize medications.  10. GERD (gastroesophageal reflux disease): Treated clinically appears to be stable  11. 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 attack: Home medications have been restarted.            Details were discussed with the patient  as well as with the hospitalist service.    Rest as ordered    Dallas Adler MD  03/06/17  10:00 PM  Please note that portions of this note may have been completed with a voice recognition program. Efforts were made to edit the dictations, but occasionally words are mistranscribed.

## 2017-03-06 NOTE — PLAN OF CARE
Problem: Fall Risk (Adult)  Goal: Identify Related Risk Factors and Signs and Symptoms  Outcome: Ongoing (interventions implemented as appropriate)    03/06/17 0318   Fall Risk   Fall Risk: Related Risk Factors age-related changes;depression/anxiety;gait/mobility problems   Fall Risk: Signs and Symptoms presence of risk factors       Goal: Absence of Falls  Outcome: Ongoing (interventions implemented as appropriate)    03/06/17 0318   Fall Risk (Adult)   Absence of Falls making progress toward outcome

## 2017-03-07 NOTE — DISCHARGE PLACEMENT REQUEST
"Ann Nevarez RN   Phone: [phone]   Fax: [phone]    Yudy Vásquez Roseann (72 y.o. Female)     Date of Birth Social Security Number Address Home Phone MRN    1945  [facility] [place] [id number] [phone] [id number]    Catholic Marital Status          None        Admission Date Admission Type Admitting Provider Attending Provider Department, Room/Bed    3/2/17 Emergency Cezar Panda DO Stark, Christopher Jon, MD [facility] MED SURG  3, 321/1    Discharge Date Discharge Disposition Discharge Destination                      Attending Provider: Zbigniew Seaman MD     Allergies:  Amphetamine-dextroamphetamine, Lorazepam, Other    Isolation:  None   Infection:  None   Code Status:  FULL    Ht:  65\" (165.1 cm)   Wt:  186 lb 6.4 oz (84.6 kg)    Admission Cmt:  None   Principal Problem:  Acute renal failure [N17.9]                 Active Insurance as of 3/2/2017     Primary Coverage     Payor Plan Insurance Group Employer/Plan Group    MEDICARE MEDICARE A & B      Payor Plan Address Payor Plan Phone Number Effective From Effective To    [address] [phone] 2/1/2009     [place] [address]       Subscriber Name Subscriber Birth Date Member ID       YUDY VÁSQUEZ ROSEANN 1945 [id number]           Secondary Coverage     Payor Plan Insurance Group Employer/Plan Group    [place] [id number]     Payor Plan Address Payor Plan Phone Number Effective From Effective To    [address]  1/1/2006     [place] [address]       Subscriber Name Subscriber Birth Date Member ID       YUDY VÁSQUEZ ROSEANN 1945 [id number]                 Emergency Contacts      (Rel.) Home Phone Work Phone Mobile Phone    Helen Duarte (Daughter) [phone] -- --            Emergency Contact Information     Name Relation Home Work Mobile    Helen Duarte Daughter [phone]               History & Physical      Cezar Panda DO" at 3/3/2017  3:32 AM              [facility]   HISTORY AND PHYSICAL      Name:  Yudy Vásquez   Age:  72 y.o.  Sex:  female  :  1945  MRN:  [id number]   Visit Number:  [id number]  Admission Date:  3/2/2017  Date Of Service:  17  Primary Care Physician:  Phuong Vera MD    History Obtained From:    patient    Chief Complaint:     Anxiety     History Of Presenting Illness:      Patient is a 72-year-old  female with hypertension and diabetes type 2 hypothyroidism, restless leg syndrome, chronic anxiety, ischemic cardiomyopathy with ejection fraction 25%, coronary artery disease, COPD on home O2, and peripheral arterial disease who comes in as she was out of Xanax and felt she was suffering from withdrawals.  She was here 3 days ago with abdominal pain, a CT abdomen and pelvis revealed constipation.  She also had elevated liver enzymes at that time.  Since then she has not really had a bowel movement, she also has chills, and continues to have belly pain which is diffuse.  She does see Dr. Fields, who has done a colonoscopy on her in the past.  She was somewhat a poor historian as she is not consistent with her story.  She has had some leg edema.  And claims to have had some dysphagia.  In the emergency room her creatinine was noted to be 3.20, which was 1.20 on 2017.  Her AST was 464, and now is 730.  Her ALT was 603 and is now 914.  In the past, her enzymes have not been elevated to this level.  Her INR is also 1.83.  Potassium in the emergency room was 6.1 she was given some calcium gluconate.  She is on multiple medications that could raise her creatinine including Lasix, Aldactone, Cozaar.  She is also on Lipitor.  She does not have a history of liver disease.  She has had her gallbladder removed.  She claims to have never had hepatitis.  She is very anxious, and intermittently taking her medications, as she is afraid of side effects.  She does  follow with a psychologist as an outpatient.  Her main concern is that her anxiety is relieved.  She is admitted for acute renal failure, and elevated liver enzymes.  She is also very pruritic, she states this is just in the last few days.    Review Of Systems:     General ROS: positive for  - fatigue and sleep disturbance  Psychological ROS: positive for - anxiety and concentration difficulties  Ophthalmic ROS: negative  ENT ROS: negative  Allergy and Immunology ROS: negative  Hematological and Lymphatic ROS: negative  Endocrine ROS: negative  Breast ROS: negative  Respiratory ROS: positive for - shortness of breath  Cardiovascular ROS: negative  Gastrointestinal ROS: positive for - abdominal pain and constipation  Genito-Urinary ROS: negative  Musculoskeletal ROS: positive for - muscular weakness  Neurological ROS: negative  Dermatological ROS: negative       Past Medical History:    Hypertension, non-insulin-dependent diabetes, pancreatitis, hypothyroidism, restless leg syndrome, colon polyps, ischemic cardiomyopathy with ejection fraction of 25%, coronary artery disease, COPD on home O2, MI, peripheral arterial disease, chronic anxiety    Past Surgical history:    AICD in , coronary artery stent in , ERCP, colonoscopy with Dr. Fields, cholecystectomy, hysterectomy, back surgery      Social History:    Quit smoking in , does not drink alcohol or use drugs, lives alone, still drives.  Has 3 sons and a daughter    Family History:    Father  of COPD mother  of dementia    Allergies:      Amphetamine-dextroamphetamine; Lorazepam; and Other    Home Medications:    Prior to Admission Medications     Prescriptions Last Dose Informant Patient Reported? Taking?    ALPRAZolam (XANAX) 0.5 MG tablet 2017  No No    Take 1 tablet by mouth 3 (Three) Times a Day As Needed (panic attack).    amoxicillin-clavulanate (AUGMENTIN) 500-125 MG per tablet   No No    Take 1 tablet by mouth 3 (Three) Times a Day.     atorvastatin (LIPITOR) 80 MG tablet 3/1/2017  No No    Take 1 tablet by mouth Every Night.    budesonide-formoterol (SYMBICORT) 160-4.5 MCG/ACT inhaler 3/1/2017  No No    Inhale 2 puffs 2 (Two) Times a Day. Rinse mouth with water after use.    buPROPion XL (WELLBUTRIN XL) 150 MG 24 hr tablet 3/1/2017  No No    Take 1 tablet by mouth Every Morning.    clopidogrel (PLAVIX) 75 MG tablet 3/1/2017  No No    TAKE ONE TABLET BY MOUTH DAILY    diphenoxylate-atropine (LOMOTIL) 2.5-0.025 MG per tablet 3/1/2017  No No    TAKE 2-3 TABS PO PRN DIARRHEA.    escitalopram (LEXAPRO) 10 MG tablet 3/1/2017  No No    Take 1 tablet by mouth Daily.    furosemide (LASIX) 40 MG tablet 3/1/2017  No No    Take 1 tablet by mouth Daily As Needed (swelling). If needed    insulin glargine (LANTUS) 100 UNIT/ML injection 3/1/2017  No No    Inject 22 Units under the skin Every Night.    ipratropium-albuterol (COMBIVENT RESPIMAT)  MCG/ACT inhaler 3/1/2017  No No    Inhale 1 puff 4 (Four) Times a Day As Needed for shortness of air.    levothyroxine (SYNTHROID, LEVOTHROID) 125 MCG tablet 3/1/2017  No No    TAKE ONE TABLET BY MOUTH DAILY    lidocaine (LIDODERM) 5 % 3/1/2017  No No    Place 1 patch on the skin every day.    losartan (COZAAR) 50 MG tablet 3/1/2017  No No    Take 1 tablet by mouth 2 (Two) Times a Day.    melatonin 3 MG tablet 3/1/2017  No No    Take 1 tablet by mouth At Night As Needed for sleep.    metFORMIN XR (GLUCOPHAGE-XR) 750 MG 24 hr tablet 3/1/2017  No No    Take 1 tablet by mouth Daily With Breakfast.    metoclopramide (REGLAN) 5 MG tablet 3/1/2017  No No    Take 0.5 tablets by mouth 2 (Two) Times a Day.    metoprolol tartrate (LOPRESSOR) 25 MG tablet 3/1/2017  No No    Take 1 tablet by mouth 2 (Two) Times a Day.    mirtazapine (REMERON) 45 MG tablet 3/1/2017  No No    Take 1 tablet by mouth Every Night.    pantoprazole (PROTONIX) 40 MG EC tablet 3/1/2017  No No    Take 1 tablet by mouth 2 (Two) Times a Day.    polyethylene  glycol (MIRALAX) packet 3/1/2017  No No    Take 17 g by mouth Daily As Needed (constipation).    promethazine (PHENERGAN) 12.5 MG tablet 3/1/2017  No No    Take 2 tablets by mouth Every 6 (Six) Hours As Needed for nausea or vomiting.    ranitidine (ZANTAC) 150 MG capsule 3/1/2017  No No    Take 1 capsule by mouth 2 (Two) Times a Day As Needed for indigestion or heartburn.    rOPINIRole (REQUIP) 1 MG tablet 3/1/2017  No No    Take 1 tablet by mouth Every Night. Take 1 hour before bedtime.    spironolactone (ALDACTONE) 25 MG tablet 3/1/2017  No No    TAKE ONE TABLET BY MOUTH DAILY    tiotropium (SPIRIVA HANDIHALER) 18 MCG per inhalation capsule 3/1/2017  No No    Place 2 puffs into inhaler and inhale Daily.             Hospital Scheduled Meds:          No current facility-administered medications for this encounter.      Vital Signs:    Temp:  [98.3 °F (36.8 °C)] 98.3 °F (36.8 °C)  Heart Rate:  [75-89] 78  Resp:  [16] 16  BP: (102-123)/(41-75) 123/75    Last 3 weights    03/02/17  2235   Weight: 175 lb (79.4 kg)       Body mass index is 29.12 kg/(m^2).    Physical Exam:      General Appearance:    Alert, but somewhat confused at times.     Head:    Normocephalic, without obvious abnormality, atraumatic   Eyes:            Lids and lashes normal, conjunctivae and sclerae normal, no   icterus, no pallor, corneas clear, PERRLA   Ears:    Ears appear intact with no abnormalities noted   Throat:   No oral lesions, no thrush, oral mucosa moist   Neck:   No adenopathy, supple, trachea midline, no thyromegaly, no     carotid bruit, no JVD   Back:     No kyphosis present, no scoliosis present, no skin lesions,       erythema or scars, no tenderness to percussion or                   palpation,   range of motion normal   Lungs:     Clear to auscultation,respirations regular, even and                   unlabored    Heart:    Regular rhythm and normal rate, normal S1 and S2, no            murmur, no gallop, no rub, no click    Breast Exam:    Deferred   Abdomen:     Normal bowel sounds, no masses, no organomegaly, soft        non-tender, non-distended, no guarding, no rebound                 tenderness   Genitalia:    Deferred   Extremities:   4/5 strength in the lower extremities bilaterally.     Pulses:   Pulses palpable and equal bilaterally   Skin:   No bleeding, bruising or rash   Lymph nodes:   No palpable adenopathy   Neurologic:   Cranial nerves 2 - 12 grossly intact, sensation intact, DTR        present and equal bilaterally       EKG:      Normal sinus rhythm with inverted T waves in 1, aVL, V5, V6    Telemetry:      Normal sinus rhythm noted    I have personally looked at both the EKG and the telemetry strips.    Labs:      Results from last 7 days  Lab Units 03/02/17  2309 02/27/17  0242   WBC 10*3/mm3 7.45 8.70   HEMOGLOBIN g/dL 10.2* 10.1*   HEMATOCRIT % 31.8* 32.6*   MCV fL 90.1 94.2   MCHC g/dL 32.1 31.0   PLATELETS 10*3/mm3 221 205   INR  1.83*  --            Results from last 7 days  Lab Units 03/02/17 2309 02/27/17  0242   SODIUM mmol/L 130* 132*   POTASSIUM mmol/L 6.1* 5.1   CHLORIDE mmol/L 92* 89*   TOTAL CO2 mmol/L 27.0 35.0*   BUN mg/dL 73* 28*   CREATININE mg/dL 3.20* 1.20   EGFR IF NONAFRICN AM mL/min/1.73 14* 44*   CALCIUM mg/dL 9.1 9.2   GLUCOSE mg/dL 159* 179*   ALBUMIN g/dL 4.10 4.30   BILIRUBIN mg/dL 1.4* 0.9   ALK PHOS U/L 166* 132*   AST (SGOT) U/L 730* 464*   ALT (SGPT) U/L 914* 603*   Estimated Creatinine Clearance: 16.3 mL/min (by C-G formula based on Cr of 3.2).  No results found for: AMMONIA          Lab Results   Component Value Date    HGBA1C 5.7 04/23/2016     Lab Results   Component Value Date    TSH 1.14 01/14/2016     No results found for: PREGTESTUR, PREGSERUM, HCG, HCGQUANT  Pain Management Panel     There is no flowsheet data to display.                          Radiology:    Imaging Results (last 7 days)     ** No results found for the last 168 hours. **          Assessment:    1.  Acute  renal failure  2.  Elevated LFTs, unknown etiology  3.  Acute hyperkalemia  4.  Diabetes type 2  5.  Hypertension  6.  Chronic anxiety disorder  7.  Ischemic cardiomyopathy with ejection fraction 25%  8.  COPD on home O2  9.  Restless leg syndrome  10.  Coronary artery disease with stent in 2008  11.  AICD status  12.  Constipation       Plan:     We will give IV fluids.  We will hold Lasix, Aldactone, Cozaar, Lipitor, Glucophage, will check a liver ultrasound.  Check iron studies as well as hepatitis testing.  Check a urinalysis as well as urine sodium.  We'll place on lactulose and also give enemas.  Consult Dr. Adler from nephrology.  We will also consult Dr. Fields from gastroenterology.  Place her on her other home medications.  Consult PT and OT.  Recheck potassium this morning.  We'll place on Lovenox for DVT prophylaxis.  We'll give Xanax for anxiety.  Further recommendations will depend on the clinical course.  Anticipated stay is greater than 2 midnights.    Cezar Panda DO  03/03/17  3:32 AM     Electronically signed by Cezar Panda DO at 3/3/2017  3:58 AM        Vital Signs (last 24 hours)       03/06 0700  -  03/07 0659 03/07 0700  -  03/07 1200   Most Recent    Temp (°F) 97.6 -  98.4       98.2 (36.8)    Heart Rate 65 -  69      67     67    Resp 18 -  20      15     15    /59 -  157/66       142/55    SpO2 (%) 93 -  98      98     98          Prior to Admission Medications     Prescriptions Last Dose Informant Patient Reported? Taking?    ALPRAZolam (XANAX) 0.5 MG tablet 2/28/2017  No No    Take 1 tablet by mouth 3 (Three) Times a Day As Needed (panic attack).    amoxicillin-clavulanate (AUGMENTIN) 500-125 MG per tablet   No No    Take 1 tablet by mouth 3 (Three) Times a Day.    Patient taking differently:  Take 1 tablet by mouth 3 (Three) Times a Day. No longer takes    atorvastatin (LIPITOR) 80 MG tablet 3/1/2017  No No    Take 1 tablet by mouth Every Night.     budesonide-formoterol (SYMBICORT) 160-4.5 MCG/ACT inhaler 3/1/2017  No No    Inhale 2 puffs 2 (Two) Times a Day. Rinse mouth with water after use.    buPROPion XL (WELLBUTRIN XL) 150 MG 24 hr tablet 3/1/2017  No No    Take 1 tablet by mouth Every Morning.    clopidogrel (PLAVIX) 75 MG tablet 3/1/2017  No No    TAKE ONE TABLET BY MOUTH DAILY    diphenoxylate-atropine (LOMOTIL) 2.5-0.025 MG per tablet 3/1/2017  No No    TAKE 2-3 TABS PO PRN DIARRHEA.    Patient taking differently:  Pt. No longer taking    escitalopram (LEXAPRO) 10 MG tablet 3/1/2017  No No    Take 1 tablet by mouth Daily.    furosemide (LASIX) 40 MG tablet 3/1/2017  No No    Take 1 tablet by mouth Daily As Needed (swelling). If needed    insulin glargine (LANTUS) 100 UNIT/ML injection 3/1/2017  No No    Inject 22 Units under the skin Every Night.    ipratropium-albuterol (COMBIVENT RESPIMAT)  MCG/ACT inhaler 3/1/2017  No No    Inhale 1 puff 4 (Four) Times a Day As Needed for shortness of air.    levothyroxine (SYNTHROID, LEVOTHROID) 125 MCG tablet 3/1/2017  No No    TAKE ONE TABLET BY MOUTH DAILY    lidocaine (LIDODERM) 5 % 3/1/2017  No No    Place 1 patch on the skin every day.    Patient taking differently:  Place 1 patch on the skin Daily. Only uses PRN    losartan (COZAAR) 50 MG tablet 3/1/2017  No No    Take 1 tablet by mouth 2 (Two) Times a Day.    melatonin 3 MG tablet 3/1/2017  No No    Take 1 tablet by mouth At Night As Needed for sleep.    Patient taking differently:  Take 3 mg by mouth At Night As Needed for sleep. No longer taking    metFORMIN XR (GLUCOPHAGE-XR) 750 MG 24 hr tablet 3/1/2017  No No    Take 1 tablet by mouth Daily With Breakfast.    metoclopramide (REGLAN) 5 MG tablet 3/1/2017  No No    Take 0.5 tablets by mouth 2 (Two) Times a Day.    metoprolol tartrate (LOPRESSOR) 25 MG tablet 3/1/2017  No No    Take 1 tablet by mouth 2 (Two) Times a Day.    mirtazapine (REMERON) 45 MG tablet 3/1/2017  No No    Take 1 tablet by  mouth Every Night.    pantoprazole (PROTONIX) 40 MG EC tablet 3/1/2017  No No    Take 1 tablet by mouth 2 (Two) Times a Day.    polyethylene glycol (MIRALAX) packet 3/1/2017  No No    Take 17 g by mouth Daily As Needed (constipation).    Patient taking differently:  Take 17 g by mouth Daily As Needed (constipation). No longer taking    promethazine (PHENERGAN) 12.5 MG tablet 3/1/2017  No No    Take 2 tablets by mouth Every 6 (Six) Hours As Needed for nausea or vomiting.    ranitidine (ZANTAC) 150 MG capsule 3/1/2017  No No    Take 1 capsule by mouth 2 (Two) Times a Day As Needed for indigestion or heartburn.    rOPINIRole (REQUIP) 1 MG tablet 3/1/2017  No No    Take 1 tablet by mouth Every Night. Take 1 hour before bedtime.    spironolactone (ALDACTONE) 25 MG tablet 3/1/2017  No No    TAKE ONE TABLET BY MOUTH DAILY    tiotropium (SPIRIVA HANDIHALER) 18 MCG per inhalation capsule 3/1/2017  No No    Place 2 puffs into inhaler and inhale Daily.          Lab Results (last 72 hours)     Procedure Component Value Units Date/Time    Potassium [[id number]]  (Abnormal) Collected:  03/04/17 1219    Specimen:  Blood Updated:  03/04/17 1242     Potassium 5.2 (H) mmol/L     Blood Gas, Arterial [[id number]]  (Abnormal) Collected:  03/04/17 1411    Specimen:  Arterial Blood Updated:  03/04/17 1411     Site Arterial Line      Ti's Test Positive      pH, Arterial 7.328 pH units      pCO2, Arterial 45.2 (H) mm Hg      pO2, Arterial 92.5 mm Hg      HCO3, Arterial 23.7 mmol/L      Base Excess, Arterial -2.3 mmol/L      O2 Saturation, Arterial 97.1 %      Hemoglobin, Blood Gas 9.9 (L) g/dL      Barometric Pressure for Blood Gas 746 mmHg      Modality Ambu bag      FIO2 21 %     Blood Gas, Arterial [[id number]]  (Abnormal) Collected:  03/04/17 1411    Specimen:  Arterial Blood Updated:  03/04/17 1413     Site Arterial: right radial      Ti's Test Positive      pH, Arterial 7.328 pH units      pCO2, Arterial 45.2 (H) mm Hg      pO2,  Arterial 92.5 mm Hg      HCO3, Arterial 23.7 mmol/L      Base Excess, Arterial -2.3 mmol/L      O2 Saturation, Arterial 97.1 %      Hemoglobin, Blood Gas 9.9 (L) g/dL      Barometric Pressure for Blood Gas 746 mmHg      Modality Cannula - Nasal      FIO2 28 %     Potassium [[id number]]  (Normal) Collected:  03/04/17 1553    Specimen:  Blood Updated:  03/04/17 1622     Potassium 4.9 mmol/L     POC Glucose Fingerstick [[id number]]  (Normal) Collected:  03/04/17 1623    Specimen:  Blood Updated:  03/04/17 1646     Glucose 96 mg/dL       Serial Number: [id number]    : [id number]       POC Glucose Fingerstick [[id number]]  (Normal) Collected:  03/04/17 1922    Specimen:  Blood Updated:  03/04/17 1950     Glucose 104 mg/dL       Serial Number: [id number]    : [id number]       Potassium [[id number]]  (Normal) Collected:  03/04/17 2123    Specimen:  Blood Updated:  03/04/17 2157     Potassium 5.0 mmol/L     Potassium [[id number]]  (Abnormal) Collected:  03/05/17 0017    Specimen:  Blood Updated:  03/05/17 0049     Potassium 5.5 (H) mmol/L     POC Glucose Fingerstick [[id number]]  (Normal) Collected:  03/05/17 0604    Specimen:  Blood Updated:  03/05/17 0630     Glucose 100 mg/dL       Serial Number: [id number]    : [id number]       CBC & Differential [[id number]] Collected:  03/05/17 0640    Specimen:  Blood Updated:  03/05/17 0657    Narrative:       The following orders were created for panel order CBC & Differential.  Procedure                               Abnormality         Status                     ---------                               -----------         ------                     CBC Auto Differential[[id number]]         Abnormal            Final result                 Please view results for these tests on the individual orders.    CBC Auto Differential [[id number]]  (Abnormal) Collected:  03/05/17 0640    Specimen:  Blood Updated:  03/05/17 0657     WBC 8.86 10*3/mm3      RBC 3.78 (L) 10*6/mm3      Hemoglobin 10.9  (L) g/dL      Hematocrit 34.9 (L) %      MCV 92.3 fL      MCH 28.8 pg      MCHC 31.2 g/dL      RDW 17.5 (H) %      RDW-SD 59.0 (H) fl      MPV 11.2 fL      Platelets 193 10*3/mm3      Neutrophil % 76.9 %      Lymphocyte % 10.0 %      Monocyte % 11.5 %      Eosinophil % 1.0 %      Basophil % 0.3 %      Immature Grans % 0.3 %      Neutrophils, Absolute 6.80 10*3/mm3      Lymphocytes, Absolute 0.89 10*3/mm3      Monocytes, Absolute 1.02 (H) 10*3/mm3      Eosinophils, Absolute 0.09 10*3/mm3      Basophils, Absolute 0.03 10*3/mm3      Immature Grans, Absolute 0.03 10*3/mm3      nRBC 0.0 /100 WBC     CK [[id number]]  (Abnormal) Collected:  03/05/17 0640    Specimen:  Blood Updated:  03/05/17 0707     Creatine Kinase 532 (H) U/L     Potassium [[id number]]  (Abnormal) Collected:  03/05/17 0640    Specimen:  Blood Updated:  03/05/17 0708     Potassium 5.5 (H) mmol/L       Specimen hemolyzed.  Results may be affected.       Phosphorus [[id number]]  (Abnormal) Collected:  03/05/17 0640    Specimen:  Blood Updated:  03/05/17 0708     Phosphorus 5.4 (H) mg/dL       Specimen hemolyzed.  Results may be affected.       Comprehensive Metabolic Panel [[id number]]  (Abnormal) Collected:  03/05/17 0640    Specimen:  Blood Updated:  03/05/17 0716     Glucose 96 mg/dL      BUN 66 (H) mg/dL       Specimen hemolyzed. Results may be affected.        Creatinine 2.50 (H) mg/dL      Sodium 133 (L) mmol/L      Potassium 5.5 (H) mmol/L       Specimen hemolyzed.  Results may be affected.        Chloride 100 mmol/L      CO2 26.0 mmol/L      Calcium 8.4 mg/dL      Total Protein 6.5 g/dL      Albumin 3.50 g/dL      ALT (SGPT) 1063 (C) U/L      AST (SGOT) 980 (C) U/L      Alkaline Phosphatase 205 (H) U/L       Specimen hemolyzed. Results may be affected.        Total Bilirubin 1.8 (H) mg/dL      eGFR Non African Amer 19 (L) mL/min/1.73      Globulin 3.0 gm/dL      A/G Ratio 1.2 g/dL      BUN/Creatinine Ratio 26.4 (H)      Anion Gap 12.5 mmol/L     Narrative:        The MDRD GFR formula is only valid for adults with stable renal function between ages 18 and 70.    Alpha - 1 - Antitrypsin [[id number]] Collected:  03/04/17 0400    Specimen:  Blood Updated:  03/05/17 0736     A-1 Antitrypsin 149 mg/dL     Narrative:       Performed at:  [address] [place] [address]  [id number]  : Yang Duke PhD, Phone:  [phone]    Protime-INR [[id number]]  (Abnormal) Collected:  03/05/17 0640    Specimen:  Blood Updated:  03/05/17 0811     Protime 16.0 (H) Seconds      INR 1.46 (H)     Potassium [[id number]]  (Abnormal) Collected:  03/05/17 0844    Specimen:  Blood Updated:  03/05/17 0933     Potassium 5.4 (H) mmol/L     CK [[id number]]  (Abnormal) Collected:  03/05/17 0844    Specimen:  Blood Updated:  03/05/17 0933     Creatine Kinase 441 (H) U/L     POC Glucose Fingerstick [[id number]]  (Normal) Collected:  03/05/17 1059    Specimen:  Blood Updated:  03/05/17 1110     Glucose 89 mg/dL       Serial Number: [id number]    : [id number]       Potassium [[id number]]  (Abnormal) Collected:  03/05/17 1225    Specimen:  Blood Updated:  03/05/17 1250     Potassium 5.9 (C) mmol/L     Hepatitis A Antibody, IgM [[id number]] Collected:  03/04/17 0400    Specimen:  Blood Updated:  03/05/17 1321     Hep A IgM Negative     Narrative:       Performed at:  [address] [place] [address]  [id number]  : Yang Duke PhD, Phone:  [phone]    Hepatitis B Core Antibody, IgM [[id number]] Collected:  03/04/17 0400    Specimen:  Blood Updated:  03/05/17 1321     Hep B Core IgM Negative     Narrative:       Performed at:  [address] [place] [address]  [id number]  : Yang Duke PhD, Phone:  [phone]    Hepatitis B Core Antibody, Total [[id number]] Collected:  03/04/17 0400    Specimen:  Blood Updated:  03/05/17 1321     Hep B Core Total Ab Negative     Narrative:       Performed at:  [address] [place] [address]  [address]  [place], [place]  [id number]  : Yang Duke PhD, Phone:  [phone]    Hepatitis B Surface Antigen [[id number]] Collected:  03/04/17 0400    Specimen:  Blood Updated:  03/05/17 1321     Hepatitis B Surface Ag Negative     Narrative:       Performed at:  [address] [place] [address]  [id number]  : Yang Duke PhD, Phone:  [phone]    Hepatitis B Surface Antibody [[id number]] Collected:  03/04/17 0400    Specimen:  Blood Updated:  03/05/17 1321     Hep B S Ab Non Reactive                     Non Reactive: Inconsistent with immunity,                              less than 10 mIU/mL                Reactive:     Consistent with immunity,                              greater than 9.9 mIU/mL       Narrative:       Performed at:  [address] [place] [address]  [id number]  : Yang Duke PhD, Phone:  [phone]    Hepatitis C Antibody [[id number]] Collected:  03/04/17 0400    Specimen:  Blood Updated:  03/05/17 1321     Hep C Virus Ab <0.1 s/co ratio                                         Negative:     < 0.8                               Indeterminate: 0.8 - 0.9                                    Positive:     > 0.9   The CDC recommends that a positive HCV antibody result   be followed up with a HCV Nucleic Acid Amplification   test (169017).       Narrative:       Performed at:  [address] [place] [address]  [id number]  : Yang Duke PhD, Phone:  [phone]    Potassium [[id number]]  (Normal) Collected:  03/05/17 1621    Specimen:  Blood Updated:  03/05/17 1710     Potassium 4.9 mmol/L     POC Glucose Fingerstick [[id number]]  (Abnormal) Collected:  03/05/17 1706    Specimen:  Blood Updated:  03/05/17 1717     Glucose 413 (H) mg/dL       Serial Number: [id number]    : [id number]       Potassium [[id number]]  (Normal) Collected:  03/05/17 2000    Specimen:  Blood Updated:  03/05/17 2021      Potassium 4.1 mmol/L     POC Glucose Fingerstick [[id number]]  (Abnormal) Collected:  03/05/17 1949    Specimen:  Blood Updated:  03/05/17 2035     Glucose 149 (H) mg/dL       Serial Number: [id number]    : [id number]       Potassium [[id number]]  (Normal) Collected:  03/06/17 0000    Specimen:  Blood Updated:  03/06/17 0019     Potassium 4.4 mmol/L     POC Glucose Fingerstick [[id number]]  (Normal) Collected:  03/06/17 0617    Specimen:  Blood Updated:  03/06/17 0626     Glucose 93 mg/dL       Serial Number: [id number]    : [id number]       Potassium [[id number]]  (Normal) Collected:  03/06/17 0525    Specimen:  Blood Updated:  03/06/17 0639     Potassium 4.1 mmol/L     Potassium [[id number]]  (Normal) Collected:  03/06/17 0741    Specimen:  Blood Updated:  03/06/17 0810     Potassium 4.3 mmol/L     POC Glucose Fingerstick [[id number]]  (Abnormal) Collected:  03/06/17 1043    Specimen:  Blood Updated:  03/06/17 1056     Glucose 185 (H) mg/dL       Serial Number: [id number]    : [id number]       Potassium [[id number]]  (Normal) Collected:  03/06/17 1155    Specimen:  Blood Updated:  03/06/17 1210     Potassium 4.0 mmol/L     [facility] Comprehensive Panel [[id number]] Collected:  03/04/17 0400    Specimen:  Blood Updated:  03/06/17 1525     Anti-DNA (DS) Ab Qn <1 IU/mL                                          Negative      <5                                     Equivocal  5 - 9                                     Positive      >9        RNP Antibodies <0.2 AI      Bullock Antibodies <0.2 AI      Antiscleroderma-70 Antibodies <0.2 AI      SHERRIE SSA (RO) Ab <0.2 AI      SHERRIE SSB (LA) Ab <0.2 AI      Antichromatin Antibodies <0.2 AI      SHIRLEY-1 IgG <0.2 AI      Anti-Centromere B Antibodies <0.2 AI      See below: Comment       Autoantibody                       Disease Association  ------------------------------------------------------------                          Condition                  Frequency  ---------------------    ------------------------   ---------  Antinuclear Antibody,    SLE, mixed connective  Direct (ELIEZER-D)           tissue diseases  ---------------------   ------------------------   ---------  dsDNA                    SLE                        40 - 60%  ---------------------   ------------------------   ---------  Chromatin                Drug induced SLE                90%                           SLE                        48 - 97%  ---------------------   ------------------------   ---------  SSA (Ro)                 SLE                        25 - 35%                           Sjogren's Syndrome         40 - 70%                            Lupus                 100%  ---------------------   ------------------------   ---------  SSB (La)                 SLE                             10%                           Sjogren's Syndrome              30%  ---------------------   -----------------------    ---------  Sm (anti-Smith)          SLE                        15 - 30%  ---------------------   -----------------------    ---------  RNP                      Mixed Connective Tissue                           Disease                         95%  (U1 nRNP,                SLE                        30 - 50%  anti-ribonucleoprotein)  Polymyositis and/or                           Dermatomyositis                 20%  ---------------------   ------------------------   ---------  Scl-70 (antiDNA          Scleroderma (diffuse)      20 - 35%  topoisomerase)           Crest                           13%  ---------------------   ------------------------   ---------  Mer-1                     Polymyositis and/or                           Dermatomyositis            20 - 40%  ---------------------   ------------------------   ---------  Centromere B             Scleroderma - Crest                           variant                         80%       Narrative:       Performed at:  [address] [facility]  [address] [facility],  [place]  [id number]  : Yang Duke PhD, Phone:  [phone]    Mitochondrial Antibodies, M2 [[id number]] Collected:  03/04/17 0400    Specimen:  Blood Updated:  03/06/17 1525     Mitochondrial Ab <20.0 Units                                       Negative    0.0 - 20.0                                  Equivocal  20.1 - 24.9                                  Positive         >24.9  Mitochondrial (M2) Antibodies are found in 90-96% of  patients with primary biliary cirrhosis.       Narrative:       Performed at:  [address]  [id number]  : Yang Duke PhD, Phone:  [phone]    Anti-Smooth Muscle Antibody Titer [[id number]] Collected:  03/04/17 0400    Specimen:  Blood Updated:  03/06/17 1525     Smooth Muscle Ab 9 Units                        Negative                     0 - 19                   Weak positive               20 - 30                   Moderate to strong positive     >30   Actin Antibodies are found in 52-85% of patients with   autoimmune hepatitis or chronic active hepatitis and   in 22% of patients with primary biliary cirrhosis.       Narrative:       Performed at:  [address]  [id number]  : Yang Duke PhD, Phone:  [phone]    POC Glucose Fingerstick [[id number]]  (Abnormal) Collected:  03/06/17 1608    Specimen:  Blood Updated:  03/06/17 1620     Glucose 317 (H) mg/dL       Serial Number: [id number]    : [id number]       Potassium [[id number]]  (Normal) Collected:  03/06/17 1600    Specimen:  Blood Updated:  03/06/17 1624     Potassium 3.9 mmol/L     Folate RBC [[id number]]  (Abnormal) Collected:  03/03/17 1207    Specimen:  Blood Updated:  03/06/17 1809     Folate, Hemolysate 515.4 ng/mL      Hematocrit 30.0 (L) %      RBC Folate 1718 ng/mL     Narrative:       Performed at:  [address]  [id number]  : Yang Duke PhD, Phone:   [phone]    CBC & Differential [[id number]] Collected:  03/06/17 1721    Specimen:  Blood Updated:  03/06/17 1810    Narrative:       The following orders were created for panel order CBC & Differential.  Procedure                               Abnormality         Status                     ---------                               -----------         ------                     CBC Auto Differential[[id number]]         Abnormal            Final result                 Please view results for these tests on the individual orders.    CBC Auto Differential [[id number]]  (Abnormal) Collected:  03/06/17 1721    Specimen:  Blood Updated:  03/06/17 1810     WBC 6.29 10*3/mm3      RBC 3.27 (L) 10*6/mm3      Hemoglobin 9.4 (L) g/dL      Hematocrit 30.3 (L) %      MCV 92.7 fL      MCH 28.7 pg      MCHC 31.0 g/dL      RDW 18.0 (H) %      RDW-SD 61.2 (H) fl      MPV 11.3 fL      Platelets 164 10*3/mm3      Neutrophil % 72.1 %      Lymphocyte % 11.1 %      Monocyte % 13.8 (H) %      Eosinophil % 2.2 %      Basophil % 0.5 %      Immature Grans % 0.3 %      Neutrophils, Absolute 4.53 10*3/mm3      Lymphocytes, Absolute 0.70 10*3/mm3      Monocytes, Absolute 0.87 10*3/mm3      Eosinophils, Absolute 0.14 10*3/mm3      Basophils, Absolute 0.03 10*3/mm3      Immature Grans, Absolute 0.02 10*3/mm3      nRBC 0.0 /100 WBC     Comprehensive Metabolic Panel [[id number]]  (Abnormal) Collected:  03/06/17 1721    Specimen:  Blood Updated:  03/06/17 1816     Glucose 264 (H) mg/dL       Glucose >180, Hemoglobin A1C recommended.        BUN 59 (H) mg/dL      Creatinine 2.00 (H) mg/dL      Sodium 138 mmol/L      Potassium 4.1 mmol/L      Chloride 101 mmol/L      CO2 28.0 mmol/L      Calcium 8.2 (L) mg/dL      Total Protein 5.5 (L) g/dL      Albumin 3.10 (L) g/dL      ALT (SGPT) 713 (C) U/L      AST (SGOT) 399 (C) U/L      Alkaline Phosphatase 165 (H) U/L      Total Bilirubin 1.0 mg/dL      eGFR Non African Amer 24 (L) mL/min/1.73      Globulin 2.4 gm/dL       A/G Ratio 1.3 g/dL      BUN/Creatinine Ratio 29.5 (H)      Anion Gap 13.1 mmol/L     Narrative:       The MDRD GFR formula is only valid for adults with stable renal function between ages 18 and 70.    POC Glucose Fingerstick [[id number]]  (Abnormal) Collected:  03/06/17 2044    Specimen:  Blood Updated:  03/06/17 2140     Glucose 221 (H) mg/dL       Serial Number: [id number]    : [id number]       POC Glucose Fingerstick [[id number]]  (Abnormal) Collected:  03/07/17 0608    Specimen:  Blood Updated:  03/07/17 0625     Glucose 224 (H) mg/dL       Serial Number: [id number]    : [id number]       Hepatitis A Antibody, Total [[id number]]  (Abnormal) Collected:  03/04/17 0400    Specimen:  Blood Updated:  03/07/17 0727     Hep A Total Ab Positive (A)     Narrative:       Performed at:  [address] [place] [address]  : Yang Duke PhD, Phone:  [phone]    POC Glucose Fingerstick [[id number]]  (Abnormal) Collected:  03/07/17 1119    Specimen:  Blood Updated:  03/07/17 1127     Glucose 248 (H) mg/dL       Serial Number: [id number]    : [id number]             Ventilator/Non-Invasive Ventilation Settings     Start     Ordered    03/04/17 2056  . AutoCPAP; Titrate for spO2: 94%  At Bedtime & As Needed-RT     Comments:  RT TITRATE   Question Answer Comment   . AutoCPAP    Titrate for spO2 94%        03/04/17 2055             Physician Progress Notes (last 24 hours) (Notes from 3/6/2017 12:00 PM through 3/7/2017 12:00 PM)      Zbigniew Seaman MD at 3/6/2017  7:34 PM  Version 1 of 1           INPATIENT PROGRESS NOTE    Date of Admission: 3/2/2017  Length of Stay: 3  Primary Care Physician: Phuong Vera MD    Subjective   HPI: Ms. Vásquez is a 72 year old female who presented to the ED with complaints of abdominal pain, pruritic rash and anxiety.  She was found to have significantly elevated LFTs, hyperkalemia, and IRINA.  She was treated and admitted for further  evaluation.    Interval History:  03/06/17 - Patient seen and examined.  Chart reviewed.  Reports improvement in her abdominal pain.  Still c/o itchiness all over.  Denies chest pain, dyspnea, n/v/d.  No events overnight.    Review Of Systems:  Otherwise complete ROS is negative except as mentioned above    Objective      Temp:  [97.4 °F (36.3 °C)-97.8 °F (36.6 °C)] 97.6 °F (36.4 °C)  Heart Rate:  [57-69] 68  Resp:  [16-18] 18  BP: (114-157)/(57-66) 157/66    Gen: Alert, appropriate, pleasant and interactive  HEENT: EOMI, ATNC, MMM  Neck: Supple  Heart: S1S2, RRR  Lungs: CTA bilaterally, no wheezes, rales, or rhonchi  Abdomen: Soft, NTND, BS+  Extremities: Warm, well-perfused, + pulses  Skin: P/W/D  Neuro: A/O x3, speech clear    Results Review:    I have reviewed the labs, radiology results and diagnostic studies.      Results from last 7 days  Lab Units 03/06/17  1721 03/06/17  1600 03/06/17  1155  03/05/17  0640  03/04/17  0400  03/03/17  0606   SODIUM mmol/L 138  --   --   --  133*  --  132*  --  131*   POTASSIUM mmol/L 4.1 3.9 4.0  < > 5.5*  5.5*  < > 4.9  4.9  < > 5.3*   CHLORIDE mmol/L 101  --   --   --  100  --  94*  --  93*   TOTAL CO2 mmol/L 28.0  --   --   --  26.0  --  27.0  --  28.0   BUN mg/dL 59*  --   --   --  66*  --  68*  --  71*   CREATININE mg/dL 2.00*  --   --   --  2.50*  --  3.10*  --  3.10*   CALCIUM mg/dL 8.2*  --   --   --  8.4  --  8.3*  --  9.3   BILIRUBIN mg/dL 1.0  --   --   --  1.8*  --   --   --  1.3   ALK PHOS U/L 165*  --   --   --  205*  --   --   --  154*   ALT (SGPT) U/L 713*  --   --   --  1063*  --   --   --  904*   AST (SGOT) U/L 399*  --   --   --  980*  --   --   --  721*   GLUCOSE mg/dL 264*  --   --   --  96  --  167*  --  96   < > = values in this interval not displayed.      Results from last 7 days  Lab Units 03/06/17  1721 03/05/17  0640 03/04/17  0400   WBC 10*3/mm3 6.29 8.86 8.50   HEMOGLOBIN g/dL 9.4* 10.9* 10.5*   HEMATOCRIT % 30.3* 34.9* 33.4*   PLATELETS  10*3/mm3 164 193 209       Results from last 7 days  Lab Units 03/05/17  0640 03/04/17  0007 03/02/17  2309   INR  1.46* 1.56* 1.83*     I have reviewed the medications.      Current Facility-Administered Medications:   •  ALPRAZolam (XANAX) tablet 0.5 mg, 0.5 mg, Oral, Q6H PRN, Mariya Guzman DO, 0.5 mg at 03/06/17 1458  •  ammonium lactate (LAC-HYDRIN) 12 % lotion, , Topical, BID PRN, Mariya Guzman DO  •  bisacodyl (DULCOLAX) suppository 10 mg, 10 mg, Rectal, Daily PRN, Cezar Panda DO  •  budesonide-formoterol (SYMBICORT) 160-4.5 MCG/ACT inhaler 2 puff, 2 puff, Inhalation, BID - RT, Cezar Panda DO, 2 puff at 03/05/17 1905  •  buPROPion XL (WELLBUTRIN XL) 24 hr tablet 150 mg, 150 mg, Oral, QAM, Ceazr Panda DO, 150 mg at 03/06/17 0633  •  CHAPSTICK 1 each, 1 each, Apply externally, 4x Daily PRN, Mariya Guzman DO, 1 each at 03/03/17 1151  •  clonazePAM (KlonoPIN) tablet 0.5 mg, 0.5 mg, Oral, Nightly PRN, Mariya Guzman DO, 0.5 mg at 03/04/17 2212  •  clopidogrel (PLAVIX) tablet 75 mg, 75 mg, Oral, Daily, Cezar Panda DO, 75 mg at 03/06/17 1023  •  dextrose (D50W) solution 25 g, 25 g, Intravenous, Q15 Min PRN, Cezar Panda DO  •  dextrose (INSTA-GLUCOSE) 77.4 % gel 1 tube, 1 tube, Oral, Q15 Min PRN, Cezar Panda DO  •  docusate sodium (COLACE) capsule 100 mg, 100 mg, Oral, BID, Cezar Panda DO, 100 mg at 03/06/17 1718  •  enoxaparin (LOVENOX) syringe 30 mg, 30 mg, Subcutaneous, Daily, Cezar Panda DO, 30 mg at 03/06/17 1026  •  escitalopram (LEXAPRO) tablet 10 mg, 10 mg, Oral, Daily, Cezar Panda DO, 10 mg at 03/06/17 1024  •  furosemide (LASIX) tablet 20 mg, 20 mg, Oral, BID, Dallas Adler MD, 20 mg at 03/06/17 1717  •  glucagon (human recombinant) (GLUCAGEN DIAGNOSTIC) injection 1 mg, 1 mg, Subcutaneous, Q15 Min PRN, Cezar Panda DO  •  hydrocortisone 2.5 % cream, , Topical, Q12H, Zbigniew Seaman MD  •  hydrOXYzine (VISTARIL)  capsule 25 mg, 25 mg, Oral, 4x Daily PRN, Cezar Panda DO, 25 mg at 03/06/17 1038  •  insulin aspart (novoLOG) injection 0-7 Units, 0-7 Units, Subcutaneous, 4x Daily AC & at Bedtime, Cezar Panda DO, 5 Units at 03/06/17 1718  •  insulin detemir (LEVEMIR) injection 20 Units, 20 Units, Subcutaneous, Nightly, Cezar Panda DO, 20 Units at 03/03/17 2006  •  ipratropium-albuterol (DUO-NEB) nebulizer solution 3 mL, 3 mL, Nebulization, Q6H - RT, Cezar Panda DO, 3 mL at 03/06/17 1308  •  ipratropium-albuterol (DUO-NEB) nebulizer solution 3 mL, 3 mL, Nebulization, Q4H PRN, Mariya Guzman DO  •  lactulose solution 20 g, 20 g, Oral, BID, Cezar Panda DO, 20 g at 03/06/17 1717  •  levothyroxine (SYNTHROID, LEVOTHROID) tablet 125 mcg, 125 mcg, Oral, Q AM, Cezar Panda DO, 125 mcg at 03/06/17 0632  •  melatonin tablet 4.5 mg, 4.5 mg, Oral, Nightly PRN, Mariya Guzman DO, 4.5 mg at 03/04/17 2104  •  mirtazapine (REMERON) tablet 45 mg, 45 mg, Oral, Nightly, Cezar Panda DO, 45 mg at 03/05/17 2029  •  neomycin-bacitracin-polymyxin (NEOSPORIN) ointment, , Topical, Q8H, Mariya Guzman DO  •  ondansetron (ZOFRAN) injection 4 mg, 4 mg, Intravenous, Q6H PRN, Cezar Panda DO  •  ondansetron (ZOFRAN) injection 4 mg, 4 mg, Intravenous, Q6H PRN, Mariya Guzman DO, 4 mg at 03/05/17 0007  •  pantoprazole (PROTONIX) EC tablet 40 mg, 40 mg, Oral, BID, Cezar Panda DO, 40 mg at 03/06/17 1718  •  polyethylene glycol (MIRALAX) powder 17 g, 17 g, Oral, Daily PRN, Cezar Panda DO, 17 g at 03/04/17 1254  •  rOPINIRole (REQUIP) tablet 1 mg, 1 mg, Oral, Nightly, Cezar Panda DO, 1 mg at 03/05/17 2029  •  sodium chloride 0.9 % flush 1-10 mL, 1-10 mL, Intravenous, PRN, Cezar Panda DO    Assessment/Plan     Problem List  Principal Problem:    Acute renal failure  Active Problems:    Anemia of chronic disease    Chronic coronary artery disease    Chronic systolic CHF  (congestive heart failure), NYHA class 3    Chronic hypoxemic respiratory failure    Controlled type 2 diabetes mellitus with circulatory disorder, with long-term current use of insulin    Essential hypertension    Ischemic cardiomyopathy    GERD (gastroesophageal reflux disease)    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 attack    Functional diarrhea    Delayed gastric emptying    Chronic kidney disease (CKD) stage G3a/A1, moderately decreased glomerular filtration rate (GFR) between 45-59 mL/min/1.73 square meter and albuminuria creatinine ratio less than 30 mg/g      IRINA on CKD  - improving  - Dr. Adler following    Elevated LFT's  - undetermined etiology  - improving  - acute hepatitis panel neg  - RUQ US unremarkable  - holding lipitor  - GI consulted, Dr. Fields following    Pruritis  - hydrocortisone cream    Anemia  - stable, monitor hemoglobin    DMII  - accuchecks, insulin protocol    COPD with chronic hypoxic respiratory failure  - continue home oxygen  - continue duonebs, symbicort    GERD   - PPI    Hypothyroid  - continue synthroid    Anxiety  - lexapro, prn xanax, clonazepam for sleep    CAD  -  Continue plavix    Chronic Systolic HF   - lasix    Continue treatment as above  AM labs            Zbigniew Seaman MD 17 7:35 PM         Electronically signed by Zbigniew Seaman MD at 3/7/2017 12:08 AM        Consult Notes (last 72 hours) (Notes from 3/4/2017 12:00 PM through 3/7/2017 12:00 PM)     No notes of this type exist for this encounter.        Nutrition Notes (last 72 hours) (Notes from 3/4/2017 12:00 PM through 3/7/2017 12:00 PM)     No notes of this type exist for this encounter.           Physical Therapy Notes (last 72 hours) (Notes from 3/4/2017 12:00 PM through 3/7/2017 12:00 PM)      Mayra Wise PTA at 3/5/2017 10:00 AM  Version 1 of 1         Acute Care - Physical Therapy Treatment Note  [facility]     Patient Name: Yudy George Fahad  :  1945  MRN: [id number]  Today's Date: 3/5/2017  Onset of Illness/Injury or Date of Surgery Date: 03/02/17  Date of Referral to PT: 03/03/17  Referring Physician: Dr. Panda    Admit Date: 3/2/2017    Visit Dx:    ICD-10-CM ICD-9-CM   1. Acute renal failure, unspecified acute renal failure type N17.9 584.9   2. Elevated liver enzymes R74.8 790.5   3. Hyperkalemia E87.5 276.7   4. Impaired functional mobility, balance, gait, and endurance Z74.09 V49.89   5. Impaired mobility and ADLs Z74.09 799.89     Patient Active Problem List   Diagnosis   • Anemia of chronic disease   • Chronic coronary artery disease   • Chronic systolic CHF (congestive heart failure), NYHA class 3   • Colon polyp   • Decreased hearing   • Chronic hypoxemic respiratory failure   • Osteopenia   • Controlled type 2 diabetes mellitus with circulatory disorder, with long-term current use of insulin   • Essential hypertension   • Psychophysiological insomnia   • Ischemic cardiomyopathy   • GERD (gastroesophageal reflux disease)   • Moderate episode of recurrent major depressive disorder   • 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 attack   • Hypothyroidism due to acquired atrophy of thyroid   • COPD, severe   • PAD (peripheral artery disease)   • Moderate to severe pulmonary hypertension   • Functional diarrhea   • Delayed gastric emptying   • RLS (restless legs syndrome)   • Low bone density   • Community acquired pneumonia   • Acute renal failure   • Chronic kidney disease (CKD) stage G3a/A1, moderately decreased glomerular filtration rate (GFR) between 45-59 mL/min/1.73 square meter and albuminuria creatinine ratio less than 30 mg/g               Adult Rehabilitation Note       03/05/17 1000 03/04/17 1000       Rehab Assessment/Intervention    Discipline physical therapy assistant  -CC      Document Type therapy note (daily note)  -CC      Subjective Information agree to therapy;complains of;fatigue  -CC      Patient  Effort, Rehab Treatment adequate  -CC      Treatment Not Performed  patient/family decline treatment, pt not feeling well  -CK     Treatment Not Performed, Comment  --   pt reports she is severely nauseated, nsg aware  -CK     Recorded by [CC] Mayra Wise PTA [CK] Saniya Hdz PTA     Vital Signs    O2 Delivery Pre Treatment supplemental O2   3 L  -CC      O2 Delivery Intra Treatment supplemental O2   3 L  -CC      O2 Delivery Post Treatment supplemental O2   3L  -CC      Recorded by [CC] Mayra Wise PTA      Pain Assessment    Pain Assessment 0-10  -CC      Pain Score 4  -CC      Post Pain Score 4  -CC      Pain Type Acute pain  -CC      Pain Location Abdomen  -CC      Pain Intervention(s) Repositioned;Medication (See MAR);Ambulation/increased activity  -CC      Recorded by [CC] Mayra Wise PTA      Vision Assessment/Intervention    Visual Impairment WFL with corrective lenses  -CC      Recorded by [CC] Mayra Wise PTA      Cognitive Assessment/Intervention    Personal Safety Interventions gait belt;nonskid shoes/slippers when out of bed;fall prevention program maintained  -CC      Recorded by [CC] Mayra Wise PTA      Bed Mobility, Assessment/Treatment    Bed Mob, Supine to Sit, [place] independent  -CC      Recorded by [CC] Mayra Wise PTA      Transfer Assessment/Treatment    Transfers, Bed-Chair Marathon contact guard assist;stand by assist;verbal cues required  -CC      Transfers, Bed-Chair-Bed, Assist Device rolling walker  -CC      Transfers, Sit-Stand [place] contact guard assist;stand by assist;verbal cues required  -CC      Transfers, Stand-Sit [place] contact guard assist;stand by assist;verbal cues required  -CC      Transfers, Sit-Stand-Sit, Assist Device rolling walker  -CC      Transfer, Safety Issues balance decreased during turns;step length decreased  -CC      Transfer, Impairments strength decreased;coordination impaired  -CC       Recorded by [CC] Mayra Wise PTA      Gait Assessment/Treatment    Gait, Winters Level contact guard assist;stand by assist;verbal cues required  -      Gait, Assistive Device rolling walker  -      Gait, Distance (Feet) 110  -CC      Gait, Gait Pattern Analysis swing-to gait  -      Gait, Gait Deviations karyn decreased;step length decreased  -      Gait, Safety Issues balance decreased during turns;step length decreased  -      Gait, Impairments strength decreased;coordination impaired  -CC      Recorded by [CC] Mayra Wise PTA      Therapy Exercises    Bilateral Lower Extremities AROM:;10 reps;supine;ankle pumps/circles;glut sets;heel slides;hip abduction/adduction;quad sets;SLR  -CC      Recorded by [CC] Mayra Wise PTA      Positioning and Restraints    Pre-Treatment Position in bed  -      Post Treatment Position chair  -CC      In Chair reclined;call light within reach;encouraged to call for assist;exit alarm on  -CC      Recorded by [CC] Mayra Wise PTA        User Key  (r) = Recorded By, (t) = Taken By, (c) = Cosigned By    Initials Name Effective Dates     Mayra Wise, [facility] 10/26/16 -     CK Saniya Hdz, [facility] 10/26/16 -                 IP PT Goals       03/05/17 1000 03/03/17 1201       Transfer Training PT LTG    Transfer Training PT LTG, Date Established  03/03/17  -LM     Transfer Training PT LTG, Time to Achieve  2 wks  -LM     Transfer Training PT LTG, Activity Type  bed to chair /chair to bed;sit to stand/stand to sit  -LM     Transfer Training PT LTG, Win[place] Level  conditional independence  -LM     Transfer Training PT LTG, Assist Device  walker, rolling  -LM     Transfer Training PT LTG, Outcome goal partially met  -CC goal ongoing  -LM     Gait Training PT LTG    Gait Training Goal PT LTG, Date Established  03/03/17  -LM     Gait Training Goal PT LTG, Time to Achieve  2 wks  -LM     Gait Training Goal PT LTG, Winters Level  conditional  independence  -LM     Gait Training Goal PT LTG, Assist Device  walker, rolling  -LM     Gait Training Goal PT LTG, Distance to Achieve  200  -LM     Gait Training Goal PT LTG, Outcome goal ongoing  -CC goal ongoing  -LM     Strength Goal PT LTG    Strength Goal PT LTG, Date Established  03/03/17  -LM     Strength Goal PT LTG, Time to Achieve  2 wks  -LM     Strength Goal PT LTG, Measure to Achieve  Patient will perform B LE ther ex x 15 reps to improve functional strength for mobility.  -LM     Strength Goal PT LTG, Outcome goal ongoing  -CC goal ongoing  -LM       User Key  (r) = Recorded By, (t) = Taken By, (c) = Cosigned By    Initials Name Provider Type    LM Jessy Monzon, PT Physical Therapist    CC Mayra Wise, YAJAIRA Physical Therapy Assistant          Physical Therapy Education     Title: PT OT SLP Therapies (Active)     Topic: Physical Therapy (Done)     Point: Mobility training (Done)    Learning Progress Summary    Learner Readiness Method Response Comment Documented by Status   Patient Acceptance E VU,NR Importance of increasing activity and ex daily btw therapy sessions as able and mobility  03/05/17 1254 Done    Acceptance E VU Purpose of PT/POC.  Proper use of RW for safe transfers/ambulation.  03/03/17 1200 Done               Point: Home exercise program (Done)    Learning Progress Summary    Learner Readiness Method Response Comment Documented by Status   Patient Acceptance E VU,NR Importance of increasing activity and ex daily btw therapy sessions as able and mobility  03/05/17 1254 Done    Acceptance E VU Purpose of PT/POC.  Proper use of RW for safe transfers/ambulation.  03/03/17 1200 Done                      User Key     Initials Effective Dates Name Provider Type Discipline     10/26/16 -  Jessy Monzon, PT Physical Therapist PT     10/26/16 -  Mayra Wise PTA Physical Therapy Assistant PT                    PT Recommendation and Plan  Anticipated Discharge Disposition:  home with home health  Planned Therapy Interventions: balance training, bed mobility training, gait training, home exercise program, patient/family education, strengthening, transfer training  PT Frequency: daily  Plan of Care Review  Progress: progress toward functional goals as expected  Outcome Summary/Follow up Plan: Pt progressing towards goals with decreased assist for transfers and increased distance amb           Outcome Measures       03/05/17 1000 03/03/17 1015 03/03/17 1011    How much help from another person do you currently need...    Turning from your back to your side while in flat bed without using bedrails? 4  -CC  4  -LM    Moving from lying on back to sitting on the side of a flat bed without bedrails? 4  -CC  4  -LM    Moving to and from a bed to a chair (including a wheelchair)? 3  -CC  3  -LM    Standing up from a chair using your arms (e.g., wheelchair, bedside chair)? 3  -CC  3  -LM    Climbing 3-5 steps with a railing? 3  -CC  3  -LM    To walk in hospital room? 3  -CC  3  -LM    AM-PAC 6 Clicks Score 20  -CC  20  -LM    How much help from another is currently needed...    Putting on and taking off regular lower body clothing?  3  -AH     Bathing (including washing, rinsing, and drying)  3  -AH     Toileting (which includes using toilet bed pan or urinal)  4  -AH     Putting on and taking off regular upper body clothing  4  -AH     Taking care of personal grooming (such as brushing teeth)  4  -AH     Eating meals  4  -     Score  22  -     Functional Assessment    Outcome Measure Options AM-PAC 6 Clicks Basic Mobility (PT)  - AM-PAC 6 Clicks Daily Activity (OT)  - AM-PAC 6 Clicks Basic Mobility (PT)  -      User Key  (r) = Recorded By, (t) = Taken By, (c) = Cosigned By    Initials Name Provider Type    JAZMÍN Dailey Occupational Therapist    JORGE Monzon, PT Physical Therapist    CC Mayra Wise, PTA Physical Therapy Assistant           Time Calculation:         PT  Charges       03/05/17 1305          Time Calculation    Start Time 1000  -CC      PT Received On 03/05/17  -CC      PT Goal Re-Cert Due Date 03/13/17  -CC      Time Calculation- PT    Total Timed Code Minutes- PT 45 minute(s)  -CC        User Key  (r) = Recorded By, (t) = Taken By, (c) = Cosigned By    Initials Name Provider Type    CC Mayra Wise PTA Physical Therapy Assistant          Therapy Charges for Today     Code Description Service Date Service Provider Modifiers Qty    51378865986 HC GAIT TRAINING EA 15 MIN 3/5/2017 Mayra Wise PTA GP 1    64528696358 HC PT THERAPEUTIC ACT EA 15 MIN 3/5/2017 Mayra Wise PTA GP 1    95336458696 HC PT THER PROC EA 15 MIN 3/5/2017 Mayra Wise PTA GP 1          PT G-Codes  Outcome Measure Options: AM-PAC 6 Clicks Basic Mobility (PT)    Mayra Wise PTA  3/5/2017          Electronically signed by Mayra Wise PTA at 3/5/2017  1:07 PM      Mayra Wise PTA at 3/5/2017  1:01 PM  Version 1 of 1         Problem: Patient Care Overview (Adult)  Goal: Plan of Care Review  Outcome: Ongoing (interventions implemented as appropriate)    03/05/17 1000   Patient Care Overview   Progress progress toward functional goals as expected   Outcome Evaluation   Outcome Summary/Follow up Plan Pt progressing towards goals with decreased assist for transfers and increased distance amb          Problem: Inpatient Physical Therapy  Goal: Transfer Training Goal 1 LTG- PT  Outcome: Ongoing (interventions implemented as appropriate)    03/03/17 1201 03/05/17 1000   Transfer Training PT LTG   Transfer Training PT LTG, Date Established 03/03/17 --    Transfer Training PT LTG, Time to Achieve 2 wks --    Transfer Training PT LTG, Activity Type bed to chair /chair to bed;sit to stand/stand to sit --    Transfer Training PT LTG, [place] Level conditional independence --    Transfer Training PT LTG, Assist Device walker, rolling --    Transfer Training PT LTG,  Outcome --  goal partially met       Goal: Gait Training Goal LTG- PT  Outcome: Ongoing (interventions implemented as appropriate)    03/03/17 1201 03/05/17 1000   Gait Training PT LTG   Gait Training Goal PT LTG, Date Established 03/03/17 --    Gait Training Goal PT LTG, Time to Achieve 2 wks --    Gait Training Goal PT LTG, [place] Level conditional independence --    Gait Training Goal PT LTG, Assist Device walker, rolling --    Gait Training Goal PT LTG, Distance to Achieve 200 --    Gait Training Goal PT LTG, Outcome --  goal ongoing       Goal: Strength Goal LTG- PT  Outcome: Ongoing (interventions implemented as appropriate)    03/03/17 1201 03/05/17 1000   Strength Goal PT LTG   Strength Goal PT LTG, Date Established 03/03/17 --    Strength Goal PT LTG, Time to Achieve 2 wks --    Strength Goal PT LTG, Measure to Achieve Patient will perform B LE ther ex x 15 reps to improve functional strength for mobility. --    Strength Goal PT LTG, Outcome --  goal ongoing              Electronically signed by Mayra Wise PTA at 3/5/2017  1:01 PM      Mayra Wise PTA at 3/6/2017  5:39 PM  Version 1 of 1         Pt refused d/t being to tired after sitting in chair for 3 hours today and after shower this afternoon     Electronically signed by Mayra Wise PTA at 3/6/2017  5:41 PM      Rigoberto Lopez PTA at 3/7/2017 11:16 AM  Version 1 of 1            03/07/17 1057   Rehab Treatment   Discipline physical therapy assistant   Treatment Not Performed patient/family declined treatment  (Pt declined treatment this am d/t sleepless night. Will attempt to f/u after lunch. )        Electronically signed by Rigoberto Lopez PTA at 3/7/2017 11:16 AM        Occupational Therapy Notes (last 72 hours) (Notes from 3/4/2017 12:00 PM through 3/7/2017 12:00 PM)     No notes of this type exist for this encounter.        Speech Language Pathology Notes (last 72 hours) (Notes from 3/4/2017 12:00 PM through 3/7/2017  12:00 PM)     No notes of this type exist for this encounter.           Respiratory Therapy Notes (last 72 hours) (Notes from 3/4/2017 12:00 PM through 3/7/2017 12:00 PM)      Rehan Guo, SURY at 3/6/2017  2:04 AM  Version 1 of 1         Problem: Patient Care Overview (Adult)  Goal: Plan of Care Review  Outcome: Ongoing (interventions implemented as appropriate)    03/06/17 0203   Coping/Psychosocial Response Interventions   Plan Of Care Reviewed With patient   Patient Care Overview   Progress no change         Problem: NPPV/CPAP (Adult)  Intervention: Assess/Manage Patient Tolerance of Noninvasive Ventilation    03/06/17 0203   Respiratory Interventions   Airway/Ventilation Management airway patency maintained;pulmonary hygiene promoted         Goal: Signs and Symptoms of Listed Potential Problems Will be Absent or Manageable (NPPV/CPAP)  Outcome: Ongoing (interventions implemented as appropriate)    03/06/17 0203   NPPV/CPAP   Problems Assessed (NPPV/CPAP) hypoxia/hypoxemia   Problems Present (NPPV/CPAP) hypoxia/hypoxemia              Electronically signed by Rehan Guo RRT at 3/6/2017  2:04 AM      Joanna Guzman, BRIAN at 3/6/2017  3:11 PM  Version 1 of 1         Problem: Patient Care Overview (Adult)  Goal: Plan of Care Review  Outcome: Ongoing (interventions implemented as appropriate)    03/06/17 1510   Coping/Psychosocial Response Interventions   Plan Of Care Reviewed With patient   Patient Care Overview   Progress no change         Problem: NPPV/CPAP (Adult)  Goal: Signs and Symptoms of Listed Potential Problems Will be Absent or Manageable (NPPV/CPAP)  Outcome: Ongoing (interventions implemented as appropriate)    03/06/17 1510   NPPV/CPAP   Problems Assessed (NPPV/CPAP) hypoxia/hypoxemia;skin breakdown   Problems Present (NPPV/CPAP) none              Electronically signed by Joanna Guzman CRT at 3/6/2017  3:11 PM

## 2017-03-07 NOTE — PROGRESS NOTES
INPATIENT PROGRESS NOTE    Date of Admission: 3/2/2017  Length of Stay: 3  Primary Care Physician: Phuong Vera MD    Subjective   HPI: Ms. Vásquez is a 72 year old female who presented to the ED with complaints of abdominal pain, pruritic rash and anxiety.  She was found to have significantly elevated LFTs, hyperkalemia, and IRINA.  She was treated and admitted for further evaluation.    Interval History:  03/06/17 - Patient seen and examined.  Chart reviewed.  Reports improvement in her abdominal pain.  Still c/o itchiness all over.  Denies chest pain, dyspnea, n/v/d.  No events overnight.    Review Of Systems:  Otherwise complete ROS is negative except as mentioned above    Objective      Temp:  [97.4 °F (36.3 °C)-97.8 °F (36.6 °C)] 97.6 °F (36.4 °C)  Heart Rate:  [57-69] 68  Resp:  [16-18] 18  BP: (114-157)/(57-66) 157/66    Gen: Alert, appropriate, pleasant and interactive  HEENT: EOMI, ATNC, MMM  Neck: Supple  Heart: S1S2, RRR  Lungs: CTA bilaterally, no wheezes, rales, or rhonchi  Abdomen: Soft, NTND, BS+  Extremities: Warm, well-perfused, + pulses  Skin: P/W/D  Neuro: A/O x3, speech clear    Results Review:    I have reviewed the labs, radiology results and diagnostic studies.      Results from last 7 days  Lab Units 03/06/17  1721 03/06/17  1600 03/06/17  1155  03/05/17  0640  03/04/17  0400  03/03/17  0606   SODIUM mmol/L 138  --   --   --  133*  --  132*  --  131*   POTASSIUM mmol/L 4.1 3.9 4.0  < > 5.5*  5.5*  < > 4.9  4.9  < > 5.3*   CHLORIDE mmol/L 101  --   --   --  100  --  94*  --  93*   TOTAL CO2 mmol/L 28.0  --   --   --  26.0  --  27.0  --  28.0   BUN mg/dL 59*  --   --   --  66*  --  68*  --  71*   CREATININE mg/dL 2.00*  --   --   --  2.50*  --  3.10*  --  3.10*   CALCIUM mg/dL 8.2*  --   --   --  8.4  --  8.3*  --  9.3   BILIRUBIN mg/dL 1.0  --   --   --  1.8*  --   --   --  1.3   ALK PHOS U/L 165*  --   --   --  205*  --   --   --  154*   ALT (SGPT) U/L 713*  --   --   --  1063*  --   --    --  904*   AST (SGOT) U/L 399*  --   --   --  980*  --   --   --  721*   GLUCOSE mg/dL 264*  --   --   --  96  --  167*  --  96   < > = values in this interval not displayed.      Results from last 7 days  Lab Units 03/06/17  1721 03/05/17  0640 03/04/17  0400   WBC 10*3/mm3 6.29 8.86 8.50   HEMOGLOBIN g/dL 9.4* 10.9* 10.5*   HEMATOCRIT % 30.3* 34.9* 33.4*   PLATELETS 10*3/mm3 164 193 209       Results from last 7 days  Lab Units 03/05/17  0640 03/04/17  0007 03/02/17  2309   INR  1.46* 1.56* 1.83*     I have reviewed the medications.      Current Facility-Administered Medications:   •  ALPRAZolam (XANAX) tablet 0.5 mg, 0.5 mg, Oral, Q6H PRN, Mariya Guzman DO, 0.5 mg at 03/06/17 1458  •  ammonium lactate (LAC-HYDRIN) 12 % lotion, , Topical, BID PRN, Mariya Guzman DO  •  bisacodyl (DULCOLAX) suppository 10 mg, 10 mg, Rectal, Daily PRN, Cezar Panda DO  •  budesonide-formoterol (SYMBICORT) 160-4.5 MCG/ACT inhaler 2 puff, 2 puff, Inhalation, BID - RT, Cezar Panda DO, 2 puff at 03/05/17 1905  •  buPROPion XL (WELLBUTRIN XL) 24 hr tablet 150 mg, 150 mg, Oral, QAM, Cezar Panda DO, 150 mg at 03/06/17 0633  •  CHAPSTICK 1 each, 1 each, Apply externally, 4x Daily PRN, Mariya Guzman DO, 1 each at 03/03/17 1151  •  clonazePAM (KlonoPIN) tablet 0.5 mg, 0.5 mg, Oral, Nightly PRN, Mariya Guzman DO, 0.5 mg at 03/04/17 2212  •  clopidogrel (PLAVIX) tablet 75 mg, 75 mg, Oral, Daily, Cezar Panda DO, 75 mg at 03/06/17 1023  •  dextrose (D50W) solution 25 g, 25 g, Intravenous, Q15 Min PRN, Cezar Panda DO  •  dextrose (INSTA-GLUCOSE) 77.4 % gel 1 tube, 1 tube, Oral, Q15 Min PRN, Cezar Panda DO  •  docusate sodium (COLACE) capsule 100 mg, 100 mg, Oral, BID, Cezar Panda DO, 100 mg at 03/06/17 1718  •  enoxaparin (LOVENOX) syringe 30 mg, 30 mg, Subcutaneous, Daily, Cezar Panda DO, 30 mg at 03/06/17 1026  •  escitalopram (LEXAPRO) tablet 10 mg, 10 mg, Oral, Daily,  Cezar Panda DO, 10 mg at 03/06/17 1024  •  furosemide (LASIX) tablet 20 mg, 20 mg, Oral, BID, Dallas Adler MD, 20 mg at 03/06/17 1717  •  glucagon (human recombinant) (GLUCAGEN DIAGNOSTIC) injection 1 mg, 1 mg, Subcutaneous, Q15 Min PRN, Cezar Panda DO  •  hydrocortisone 2.5 % cream, , Topical, Q12H, Zbigniew Seaman MD  •  hydrOXYzine (VISTARIL) capsule 25 mg, 25 mg, Oral, 4x Daily PRN, Cezar Panda DO, 25 mg at 03/06/17 1038  •  insulin aspart (novoLOG) injection 0-7 Units, 0-7 Units, Subcutaneous, 4x Daily AC & at Bedtime, Cezar Panda DO, 5 Units at 03/06/17 1718  •  insulin detemir (LEVEMIR) injection 20 Units, 20 Units, Subcutaneous, Nightly, Cezar Panda DO, 20 Units at 03/03/17 2006  •  ipratropium-albuterol (DUO-NEB) nebulizer solution 3 mL, 3 mL, Nebulization, Q6H - RT, Cezar Panda DO, 3 mL at 03/06/17 1308  •  ipratropium-albuterol (DUO-NEB) nebulizer solution 3 mL, 3 mL, Nebulization, Q4H PRN, Mariya Guzman DO  •  lactulose solution 20 g, 20 g, Oral, BID, Cezar Panda DO, 20 g at 03/06/17 1717  •  levothyroxine (SYNTHROID, LEVOTHROID) tablet 125 mcg, 125 mcg, Oral, Q AM, Cezar Panda DO, 125 mcg at 03/06/17 0632  •  melatonin tablet 4.5 mg, 4.5 mg, Oral, Nightly PRN, Mariya Guzman DO, 4.5 mg at 03/04/17 2104  •  mirtazapine (REMERON) tablet 45 mg, 45 mg, Oral, Nightly, Cezar Panda DO, 45 mg at 03/05/17 2029  •  neomycin-bacitracin-polymyxin (NEOSPORIN) ointment, , Topical, Q8H, Mariya Guzman DO  •  ondansetron (ZOFRAN) injection 4 mg, 4 mg, Intravenous, Q6H PRN, Cezar Panda DO  •  ondansetron (ZOFRAN) injection 4 mg, 4 mg, Intravenous, Q6H PRN, Mariya Guzman DO, 4 mg at 03/05/17 0007  •  pantoprazole (PROTONIX) EC tablet 40 mg, 40 mg, Oral, BID, Cezar Panda DO, 40 mg at 03/06/17 1718  •  polyethylene glycol (MIRALAX) powder 17 g, 17 g, Oral, Daily PRN, Cezar Panda DO, 17 g at 03/04/17 1254  •   rOPINIRole (REQUIP) tablet 1 mg, 1 mg, Oral, Nightly, Cezar Panda DO, 1 mg at 03/05/17 2029  •  sodium chloride 0.9 % flush 1-10 mL, 1-10 mL, Intravenous, PRN, Cezar Panda DO    Assessment/Plan     Problem List  Principal Problem:    Acute renal failure  Active Problems:    Anemia of chronic disease    Chronic coronary artery disease    Chronic systolic CHF (congestive heart failure), NYHA class 3    Chronic hypoxemic respiratory failure    Controlled type 2 diabetes mellitus with circulatory disorder, with long-term current use of insulin    Essential hypertension    Ischemic cardiomyopathy    GERD (gastroesophageal reflux disease)    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 attack    Functional diarrhea    Delayed gastric emptying    Chronic kidney disease (CKD) stage G3a/A1, moderately decreased glomerular filtration rate (GFR) between 45-59 mL/min/1.73 square meter and albuminuria creatinine ratio less than 30 mg/g      IRINA on CKD  - improving  - Dr. Adler following    Elevated LFT's  - undetermined etiology  - improving  - acute hepatitis panel neg  - RUQ US unremarkable  - holding lipitor  - GI consulted, Dr. Fields following    Pruritis  - hydrocortisone cream    Anemia  - stable, monitor hemoglobin    DMII  - accuchecks, insulin protocol    COPD with chronic hypoxic respiratory failure  - continue home oxygen  - continue duonebs, symbicort    GERD   - PPI    Hypothyroid  - continue synthroid    Anxiety  - lexapro, prn xanax, clonazepam for sleep    CAD  -  Continue plavix    Chronic Systolic HF   - lasix    Continue treatment as above  AM labs            Zbigniew Seaman MD 03/06/17 7:35 PM

## 2017-03-07 NOTE — PROGRESS NOTES
INPATIENT PROGRESS NOTE    Date of Admission: 3/2/2017  Length of Stay: 4  Primary Care Physician: Phuong Vera MD    Subjective   HPI: Ms. Vásquez is a 72 year old female who presented to the ED with complaints of abdominal pain, pruritic rash and anxiety.  She was found to have significantly elevated LFTs, hyperkalemia, and IRIAN.  She was treated and admitted for further evaluation.    Interval History:  03/07/17 - Patient seen and examined.  Rested well overnight.  Complains of constipation.  Appetite fair. No N/V/D, no dypsnea or chest pain.    Review Of Systems:  Otherwise complete ROS is negative except as mentioned above    Objective      Temp:  [98 °F (36.7 °C)-98.4 °F (36.9 °C)] 98 °F (36.7 °C)  Heart Rate:  [65-76] 76  Resp:  [15-20] 20  BP: (127-153)/(55-66) 153/66    Gen: Alert, appropriate, pleasant and interactive  HEENT: EOMI, ATNC, MMM  Neck: Supple  Heart: S1S2, RRR  Lungs: CTA bilaterally, no wheezes, rales, or rhonchi  Abdomen: Soft, NTND, BS+  Extremities: Warm, well-perfused, + pulses  Skin: P/W/D  Neuro: A/O x3, speech clear    Results Review:    I have reviewed the labs, radiology results and diagnostic studies.      Results from last 7 days  Lab Units 03/06/17  1721 03/06/17  1600 03/06/17  1155  03/05/17  0640  03/04/17  0400  03/03/17  0606   SODIUM mmol/L 138  --   --   --  133*  --  132*  --  131*   POTASSIUM mmol/L 4.1 3.9 4.0  < > 5.5*  5.5*  < > 4.9  4.9  < > 5.3*   CHLORIDE mmol/L 101  --   --   --  100  --  94*  --  93*   TOTAL CO2 mmol/L 28.0  --   --   --  26.0  --  27.0  --  28.0   BUN mg/dL 59*  --   --   --  66*  --  68*  --  71*   CREATININE mg/dL 2.00*  --   --   --  2.50*  --  3.10*  --  3.10*   CALCIUM mg/dL 8.2*  --   --   --  8.4  --  8.3*  --  9.3   BILIRUBIN mg/dL 1.0  --   --   --  1.8*  --   --   --  1.3   ALK PHOS U/L 165*  --   --   --  205*  --   --   --  154*   ALT (SGPT) U/L 713*  --   --   --  1063*  --   --   --  904*   AST (SGOT) U/L 399*  --   --   --   980*  --   --   --  721*   GLUCOSE mg/dL 264*  --   --   --  96  --  167*  --  96   < > = values in this interval not displayed.      Results from last 7 days  Lab Units 03/06/17  1721 03/05/17  0640 03/04/17  0400   WBC 10*3/mm3 6.29 8.86 8.50   HEMOGLOBIN g/dL 9.4* 10.9* 10.5*   HEMATOCRIT % 30.3* 34.9* 33.4*   PLATELETS 10*3/mm3 164 193 209       Results from last 7 days  Lab Units 03/05/17  0640 03/04/17  0007 03/02/17  2309   INR  1.46* 1.56* 1.83*     I have reviewed the medications.      Current Facility-Administered Medications:   •  ALPRAZolam (XANAX) tablet 0.5 mg, 0.5 mg, Oral, Q6H PRN, Mariya Guzman DO, 0.5 mg at 03/07/17 1712  •  ammonium lactate (LAC-HYDRIN) 12 % lotion, , Topical, BID PRN, Mariya Guzman DO  •  bisacodyl (DULCOLAX) suppository 10 mg, 10 mg, Rectal, Daily PRN, Cezar Panda DO  •  budesonide-formoterol (SYMBICORT) 160-4.5 MCG/ACT inhaler 2 puff, 2 puff, Inhalation, BID - RT, Cezar Panda DO, 2 puff at 03/05/17 1905  •  buPROPion XL (WELLBUTRIN XL) 24 hr tablet 150 mg, 150 mg, Oral, QAM, Cezar Panda DO, 150 mg at 03/07/17 0632  •  CHAPSTICK 1 each, 1 each, Apply externally, 4x Daily PRN, Mariya Guzman DO, 1 each at 03/03/17 1151  •  clonazePAM (KlonoPIN) tablet 0.5 mg, 0.5 mg, Oral, Nightly PRN, Mariya Guzman DO, 0.5 mg at 03/04/17 2212  •  clopidogrel (PLAVIX) tablet 75 mg, 75 mg, Oral, Daily, Cezar Panda DO, 75 mg at 03/07/17 0823  •  dextrose (D50W) solution 25 g, 25 g, Intravenous, Q15 Min PRN, Cezar Panda DO  •  dextrose (INSTA-GLUCOSE) 77.4 % gel 1 tube, 1 tube, Oral, Q15 Min PRN, Cezar Panda DO  •  docusate sodium (COLACE) capsule 100 mg, 100 mg, Oral, BID, Cezar Panda DO, 100 mg at 03/07/17 1712  •  enoxaparin (LOVENOX) syringe 30 mg, 30 mg, Subcutaneous, Daily, Cezar aPnda DO, 30 mg at 03/07/17 0822  •  escitalopram (LEXAPRO) tablet 10 mg, 10 mg, Oral, Daily, Cezar Panda DO, 10 mg at 03/07/17 0823  •   furosemide (LASIX) tablet 20 mg, 20 mg, Oral, BID, Dallas Adler MD, 20 mg at 03/07/17 1712  •  glucagon (human recombinant) (GLUCAGEN DIAGNOSTIC) injection 1 mg, 1 mg, Subcutaneous, Q15 Min PRN, Cezar Panda DO  •  hydrocortisone 2.5 % cream, , Topical, Q12H, Zbigniew Seaman MD  •  hydrOXYzine (VISTARIL) capsule 25 mg, 25 mg, Oral, 4x Daily PRN, Cezar Panda DO, 25 mg at 03/07/17 0427  •  insulin aspart (novoLOG) injection 0-7 Units, 0-7 Units, Subcutaneous, 4x Daily AC & at Bedtime, Cezar Panda DO, 2 Units at 03/07/17 1712  •  insulin detemir (LEVEMIR) injection 20 Units, 20 Units, Subcutaneous, Nightly, Cezar Panda DO, 20 Units at 03/06/17 2145  •  ipratropium-albuterol (DUO-NEB) nebulizer solution 3 mL, 3 mL, Nebulization, Q6H - RT, Cezar Panda DO, 3 mL at 03/07/17 0807  •  ipratropium-albuterol (DUO-NEB) nebulizer solution 3 mL, 3 mL, Nebulization, Q4H PRN, Mariya Guzman DO  •  lactulose solution 20 g, 20 g, Oral, BID, Cezar Panda DO, 20 g at 03/07/17 1712  •  levothyroxine (SYNTHROID, LEVOTHROID) tablet 125 mcg, 125 mcg, Oral, Q AM, Cezar Panda DO, 125 mcg at 03/07/17 0632  •  melatonin tablet 4.5 mg, 4.5 mg, Oral, Nightly PRN, Mariya Guzman DO, 4.5 mg at 03/04/17 2104  •  mirtazapine (REMERON) tablet 45 mg, 45 mg, Oral, Nightly, Cezar Panda DO, 45 mg at 03/06/17 2145  •  neomycin-bacitracin-polymyxin (NEOSPORIN) ointment, , Topical, Q8H, Mariya Guzman DO  •  ondansetron (ZOFRAN) injection 4 mg, 4 mg, Intravenous, Q6H PRN, Cezar Panda DO  •  ondansetron (ZOFRAN) injection 4 mg, 4 mg, Intravenous, Q6H PRN, Mariya Guzman DO, 4 mg at 03/05/17 0007  •  pantoprazole (PROTONIX) EC tablet 40 mg, 40 mg, Oral, BID, Cezar Panda DO, 40 mg at 03/07/17 1712  •  polyethylene glycol (MIRALAX) powder 17 g, 17 g, Oral, Daily PRN, Cezar Panda, , 17 g at 03/04/17 1254  •  rOPINIRole (REQUIP) tablet 1 mg, 1 mg, Oral, Nightly,  Cezar Panda DO, 1 mg at 03/06/17 0515  •  sodium chloride 0.9 % flush 1-10 mL, 1-10 mL, Intravenous, PRN, Cezar Panda DO    Assessment/Plan     Problem List  Principal Problem:    Acute renal failure  Active Problems:    Anemia of chronic disease    Chronic coronary artery disease    Chronic systolic CHF (congestive heart failure), NYHA class 3    Chronic hypoxemic respiratory failure    Controlled type 2 diabetes mellitus with circulatory disorder, with long-term current use of insulin    Essential hypertension    Ischemic cardiomyopathy    GERD (gastroesophageal reflux disease)    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 ARB/NSAID/diuretic therapy  - Dr. Adler following    Elevated LFT's  - undetermined etiology  - improving  - acute hepatitis panel neg  - RUQ US unremarkable  - holding lipitor  - GI consulted, Dr. Fields following    Pruritis  - hydrocortisone cream    Anemia  - stable, monitor hemoglobin    DMII  - accuchecks, insulin protocol    COPD with chronic hypoxic respiratory failure  - continue home oxygen  - continue duonebs, symbicort    GERD   - PPI    Hypothyroid  - continue synthroid    Anxiety  - lexapro, prn xanax, clonazepam for sleep    CAD  -  Continue plavix    Chronic Systolic HF   - lasix    Constipation  - mag citrate      Continue treatment as above  Need improved glycemic control, will adjust insulin  Case management working on placement  AM labs            Zbigniew Seaman MD 03/07/17 6:52 PM

## 2017-03-07 NOTE — PROGRESS NOTES
"Nephrology Progress Note.    LOS: 4 days    Patient Care Team:  Phuong Vera MD as PCP - General    Chief Complaint:    Chief Complaint   Patient presents with   • Muscle Pain       Subjective   Patient seen and examined this morning.  Events from last night noted.  Patient denies having any fevers chills.  No nausea or vomiting no abdominal pain.  Denies any chest pain or shortness of breath, cough or sputum production.  There is no significant edema noted.  Patient also denies having new onset weakness of numbness of either extremity.  She does think that she is  significantly better than yesterday.      Review of Systems:    The pertinent  ROS was done and it is noted above, rest  was negative.    Objective     Vital Signs  Visit Vitals   • /55 (BP Location: Left arm, Patient Position: Lying)   • Pulse 67   • Temp 98.2 °F (36.8 °C) (Oral)   • Resp 15   • Ht 65\" (165.1 cm)   • Wt 186 lb 6.4 oz (84.6 kg)   • SpO2 98%   • BMI 31.02 kg/m2         I/O this shift:  In: 240 [P.O.:240]  Out: 350 [Urine:350]    Intake/Output Summary (Last 24 hours) at 03/07/17 0950  Last data filed at 03/07/17 0847   Gross per 24 hour   Intake    480 ml   Output   1650 ml   Net  -1170 ml       Physical Exam:    General Appearance: alert, oriented x 3, no acute distress,   HEENT: pupils round and reactive to light, oral mucosa dry, extra occular movements intact.  Neck: supple, no JVD, trachea midline  Lungs: Clear to Auscultation she does have occasional basal crackles, unlabored breathing effort  Heart: RRR, normal S1 and S2, no S3, no rub  Abdomen: Obese soft, non-tender, no palpable bladder, present bowel sounds to auscultation  Extremities: Trace to 1+ edema, no cyanosis or clubbing.   Neuro: normal speech and mental status, grossly non focal.     Results Review:      Results from last 7 days  Lab Units 03/06/17  1721 03/06/17  1600 03/06/17  1155  03/05/17  0640  03/04/17  0400  03/03/17  0606   SODIUM mmol/L 138  --   --   " --  133*  --  132*  --  131*   POTASSIUM mmol/L 4.1 3.9 4.0  < > 5.5*  5.5*  < > 4.9  4.9  < > 5.3*   CHLORIDE mmol/L 101  --   --   --  100  --  94*  --  93*   TOTAL CO2 mmol/L 28.0  --   --   --  26.0  --  27.0  --  28.0   BUN mg/dL 59*  --   --   --  66*  --  68*  --  71*   CREATININE mg/dL 2.00*  --   --   --  2.50*  --  3.10*  --  3.10*   CALCIUM mg/dL 8.2*  --   --   --  8.4  --  8.3*  --  9.3   BILIRUBIN mg/dL 1.0  --   --   --  1.8*  --   --   --  1.3   ALK PHOS U/L 165*  --   --   --  205*  --   --   --  154*   ALT (SGPT) U/L 713*  --   --   --  1063*  --   --   --  904*   AST (SGOT) U/L 399*  --   --   --  980*  --   --   --  721*   GLUCOSE mg/dL 264*  --   --   --  96  --  167*  --  96   < > = values in this interval not displayed.    Estimated Creatinine Clearance: 26.9 mL/min (by C-G formula based on Cr of 2).      Results from last 7 days  Lab Units 03/05/17  0640 03/04/17  0400 03/03/17  0606   MAGNESIUM mg/dL  --   --  1.8   PHOSPHORUS mg/dL 5.4* 5.7*  --      Results for ASHLIE VALENTIN (MRN [id number]) as of 3/4/2017 07:46   Ref. Range 3/3/2017 06:07   Iron Latest Ref Range: 37 - 181 mcg/dL 33 (L)   Ferritin Latest Ref Range: 11.10 - 264.00 ng/mL 78.60   Iron Saturation Latest Ref Range: 11 - 46 % 8 (L)   TIBC Latest Ref Range: 261 - 497 mcg/dL 423             Results from last 7 days  Lab Units 03/06/17  1721 03/05/17  0640 03/04/17  0400 03/03/17  0514 03/02/17  2309   WBC 10*3/mm3 6.29 8.86 8.50 7.93 7.45   HEMOGLOBIN g/dL 9.4* 10.9* 10.5* 9.9* 10.2*   PLATELETS 10*3/mm3 164 193 209 244 221     Results for ASHLIE VALENTIN (MRN [id number]) as of 3/4/2017 07:46   Ref. Range 3/3/2017 05:14   Reticulocyte % Latest Ref Range: 0.50 - 1.50 % 4.21 (H)       Results from last 7 days  Lab Units 03/05/17  0640 03/04/17  0007 03/02/17  2309   INR  1.46* 1.56* 1.83*         Imaging Results (last 24 hours)     Procedure Component Value Units Date/Time     Liver [[id number]] Collected:  03/03/17 0921      Updated:  03/03/17 0923    Narrative:       PROCEDURE: US LIVER-     HISTORY: elevated lfts; N17.9-Acute kidney failure, unspecified;  R74.8-Abnormal levels of other serum enzymes; E87.5-Hyperkalemia     PROCEDURE: Ultrasound images of the right upper quadrant were obtained.     FINDINGS: Limited images of the pancreas are unremarkable. The liver  parenchyma is normal in echogenicity. The gallbladder is surgically  absent.  There is no ascites.  The common duct measures 5.6 mm      .  Limited images of the right kidney are unremarkable.       Impression:       Unremarkable right upper quadrant ultrasound.     This report was finalized on 3/3/2017 9:21 AM by Mercedes Zhou M.D..    US Venous Doppler Lower Extremity Left (duplex) [[id number]] Collected:  03/03/17 1553     Updated:  03/03/17 1556    Narrative:       PROCEDURE: US VENOUS DOPPLER LOWER EXTREMITY LEFT (DUPLEX)-     HISTORY: edema left leg; N17.9-Acute kidney failure, unspecified;  R74.8-Abnormal levels of other serum enzymes; E87.5-Hyperkalemia;  Z74.09-Other reduced mobility; Z74.09-Other reduced mobility     PROCEDURE: Multiple transverse and longitudinal scans were performed of  the femoropopliteal deep venous system, with augmentation and  compression maneuvers.     FINDINGS: Normal phasic flow was noted in the visualized deep venous  system. No intraluminal increased echogenicity is noted to suggest  thrombus. There is normal compression and augmentation of the venous  structures. No abnormal venous collaterals are seen. No venous reflux is  demonstrated.       Impression:       No evidence of lower extremity DVT.     This report was finalized on 3/3/2017 3:54 PM by Mercedes Zhou M.D..          budesonide-formoterol 2 puff Inhalation BID - RT   buPROPion  mg Oral QAM   clopidogrel 75 mg Oral Daily   docusate sodium 100 mg Oral BID   enoxaparin 30 mg Subcutaneous Daily   escitalopram 10 mg Oral Daily   furosemide 20 mg Oral BID    hydrocortisone  Topical Q12H   insulin aspart 0-7 Units Subcutaneous 4x Daily AC & at Bedtime   insulin detemir 20 Units Subcutaneous Nightly   ipratropium-albuterol 3 mL Nebulization Q6H - RT   lactulose 20 g Oral BID   levothyroxine 125 mcg Oral Q AM   mirtazapine 45 mg Oral Nightly   neomycin-bacitracin-polymyxin  Topical Q8H   pantoprazole 40 mg Oral BID   rOPINIRole 1 mg Oral Nightly          Medication Review:   Current Facility-Administered Medications   Medication Dose Route Frequency Provider Last Rate Last Dose   • ALPRAZolam (XANAX) tablet 0.5 mg  0.5 mg Oral Q6H PRN Mariya Guzman DO   0.5 mg at 03/07/17 0916   • ammonium lactate (LAC-HYDRIN) 12 % lotion   Topical BID PRN Mariya Guzman DO       • bisacodyl (DULCOLAX) suppository 10 mg  10 mg Rectal Daily PRN Cezar Panda DO       • budesonide-formoterol (SYMBICORT) 160-4.5 MCG/ACT inhaler 2 puff  2 puff Inhalation BID - RT Cezar Panda DO   2 puff at 03/05/17 1905   • buPROPion XL (WELLBUTRIN XL) 24 hr tablet 150 mg  150 mg Oral QAM Czear Panda DO   150 mg at 03/07/17 0632   • CHAPSTICK 1 each  1 each Apply externally 4x Daily PRN Mariya Guzman DO   1 each at 03/03/17 1151   • clonazePAM (KlonoPIN) tablet 0.5 mg  0.5 mg Oral Nightly PRN Mariya Guzman DO   0.5 mg at 03/04/17 2212   • clopidogrel (PLAVIX) tablet 75 mg  75 mg Oral Daily Cezar Panda DO   75 mg at 03/07/17 0823   • dextrose (D50W) solution 25 g  25 g Intravenous Q15 Min PRN Cezar Panda DO       • dextrose (INSTA-GLUCOSE) 77.4 % gel 1 tube  1 tube Oral Q15 Min PRN Cezar Panda DO       • docusate sodium (COLACE) capsule 100 mg  100 mg Oral BID Cezar Panda DO   100 mg at 03/07/17 0823   • enoxaparin (LOVENOX) syringe 30 mg  30 mg Subcutaneous Daily Cezar Panda DO   30 mg at 03/07/17 0822   • escitalopram (LEXAPRO) tablet 10 mg  10 mg Oral Daily Cezar Panda DO   10 mg at 03/07/17 0823   • furosemide (LASIX) tablet 20  mg  20 mg Oral BID Dallas Adler MD   20 mg at 03/07/17 0823   • glucagon (human recombinant) (GLUCAGEN DIAGNOSTIC) injection 1 mg  1 mg Subcutaneous Q15 Min PRN Cezar Panda DO       • hydrocortisone 2.5 % cream   Topical Q12H Zbigniew Seaman MD       • hydrOXYzine (VISTARIL) capsule 25 mg  25 mg Oral 4x Daily PRN Cezar Panda DO   25 mg at 03/07/17 0427   • insulin aspart (novoLOG) injection 0-7 Units  0-7 Units Subcutaneous 4x Daily AC & at Bedtime Cezar Panda DO   3 Units at 03/07/17 0632   • insulin detemir (LEVEMIR) injection 20 Units  20 Units Subcutaneous Nightly Cezar Panda DO   20 Units at 03/06/17 2145   • ipratropium-albuterol (DUO-NEB) nebulizer solution 3 mL  3 mL Nebulization Q6H - RT Cezar Panda DO   3 mL at 03/07/17 0807   • ipratropium-albuterol (DUO-NEB) nebulizer solution 3 mL  3 mL Nebulization Q4H PRN Mariya Guzman DO       • lactulose solution 20 g  20 g Oral BID Cezar Panda DO   20 g at 03/07/17 0823   • levothyroxine (SYNTHROID, LEVOTHROID) tablet 125 mcg  125 mcg Oral Q AM Cezar Panda, DO   125 mcg at 03/07/17 0632   • melatonin tablet 4.5 mg  4.5 mg Oral Nightly PRN Mariya Guzman DO   4.5 mg at 03/04/17 2104   • mirtazapine (REMERON) tablet 45 mg  45 mg Oral Nightly Cezar Panda DO   45 mg at 03/06/17 2145   • neomycin-bacitracin-polymyxin (NEOSPORIN) ointment   Topical Q8H Mariya Guzman DO       • ondansetron (ZOFRAN) injection 4 mg  4 mg Intravenous Q6H PRN Cezar Panda DO       • ondansetron (ZOFRAN) injection 4 mg  4 mg Intravenous Q6H PRN Mariya Guzman DO   4 mg at 03/05/17 0007   • pantoprazole (PROTONIX) EC tablet 40 mg  40 mg Oral BID Cezar Panda,    40 mg at 03/07/17 0823   • polyethylene glycol (MIRALAX) powder 17 g  17 g Oral Daily PRN Cezar Panda,    17 g at 03/04/17 1254   • rOPINIRole (REQUIP) tablet 1 mg  1 mg Oral Nightly Cezar Panda DO   1 mg at 03/06/17 1449   •  sodium chloride 0.9 % flush 1-10 mL  1-10 mL Intravenous PRN Cezar Panda, DO           Assessment/Plan     1. Acute renal failure: Clinically doing better and renal function is also improving.  No labs today will recheck it in the morning.  2. Anemia of chronic disease: Workup as ordered she may need to further GI workup, it appears she will need to be assessed by the gastroenterologist for abnormal liver function and probably will need EGD and colonscopy done as well, she has low iron stores, she will benefit from IV iron.  If there is no signs of infection we will go ahead and start the iron infusions and which can be continued as an outpatient.  3. Chronic kidney disease (CKD) stage G3a/A1, moderately decreased glomerular filtration rate (GFR) between 45-59 mL/min/1.73 square meter and albuminuria creatinine ratio less than 30 mg/g: She has known to have baseline chronic kidney disease stage III does have proteinuria.  We will see where she will finally settled down as far as her renal function.  4. Abnormal liver functions: Gastroenterology evaluation is in progress, Since her diuretics the liver function is improving.  Continue to follow labs  5. Chronic coronary artery disease: She does not had any acute symptoms.  6. Chronic systolic CHF (congestive heart failure), NYHA class 3: Clinically appears to be stable.  She is not getting any IV fluids at this point.    7. Controlled type 2 diabetes mellitus with circulatory disorder, with long-term current use of insulin: Continue current treatment plan.  8. Essential hypertension: It appears to be under fair control at this point depending on her blood pressure will have to be reassessed on day-to-day basis will hold off using any angiotensin receptor blocker at this point. She will likely benefit from it once the volume status is more stable.  9. Ischemic cardiomyopathy: Continue to optimize medications.  10. GERD (gastroesophageal reflux disease): Treated  clinically appears to be stable  11. 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 attack: Home medications have been restarted.            Details were discussed with the patient as well as with the hospitalist service.    Rest as ordered    Dallas Adler MD  03/07/17  9:50 AM  Please note that portions of this note may have been completed with a voice recognition program. Efforts were made to edit the dictations, but occasionally words are mistranscribed.

## 2017-03-07 NOTE — PLAN OF CARE
Problem: Patient Care Overview (Adult)  Goal: Plan of Care Review  Outcome: Ongoing (interventions implemented as appropriate)    03/07/17 0447   Coping/Psychosocial Response Interventions   Plan Of Care Reviewed With patient   Patient Care Overview   Progress no change   Outcome Evaluation   Outcome Summary/Follow up Plan Patient has had difficult time resting tonight because of continued itching.          Problem: Fall Risk (Adult)  Goal: Identify Related Risk Factors and Signs and Symptoms  Outcome: Ongoing (interventions implemented as appropriate)    03/07/17 0447   Fall Risk   Fall Risk: Related Risk Factors age-related changes   Fall Risk: Signs and Symptoms presence of risk factors       Goal: Absence of Falls  Outcome: Ongoing (interventions implemented as appropriate)    03/07/17 0447   Fall Risk (Adult)   Absence of Falls making progress toward outcome

## 2017-03-07 NOTE — PROGRESS NOTES
"  SUBJECTIVE: The patient feels better.  She denies further nausea.  Abdominal pain has significantly improved.  There is no hematemesis, melena or hematochezia.  Oral intake is still not good.  Appetite has somewhat improved.  She denies reflux.  The patient has occasional difficulty swallowing specially with solid food.  The patient points towards neck.      UNDERLYING CHRONIC ILLNESS: Depression and anxiety.      REVIEW OF SYSTEMS:    Constitutional: There is no fever or chills.  CNS: No recent seizure or stroke.    Cardiovascular: No recent chest pains or palpitations.  Pulmonary: Occasional cough.  Denies shortness of breath.  Liver: No jaundice.  Rheumatologic: Denies specific joint symptoms.  However the patient has generalized myalgia as an arthralgia.  Skin: Continues to have pruritus.  Although itching is better than yesterday.    Renal: Making urine.  No dysuria.  Psychiatric : Depression and anxiety improved.  The patient is not confused.  Nutrition: Eating solid food.  Oral intake is still not up to the sheng.  Activities: Mostly in bed.    OBJECTIVE:  Alert and oriented ×3.  No apparent distress.    Vital signs:    Blood pressure 133/62, pulse 69, temperature 97.4 °F (36.3 °C), temperature source Oral, resp. rate 18, height 65\" (165.1 cm), weight 185 lb 6.4 oz (84.1 kg), SpO2 100 %.    HEENT: No icterus.  The patient appears to be pale.  Neck: Supple.  No JVD.  No thyromegaly.    Chest: Chest clear to auscultation.  Cardiovascular: No S3 no murmurs.  Abdomen: Soft.  Bowel sounds present.  Nondistended.  Nontender.  No mass was felt.  No hepatosplenomegaly.  No ascites.  Extremities: No edema.  Some bruising of the upper extremities.  Rectal: Deferred.  Stigmata of chronic liver disease: None.  Neurological: No focal motor neurological deficit.  No asterixis.  Rheumatologic: No obvious joint swelling.    Laboratory tests:  Dated March 3, 2017 serum iron 33 TIBC 423% age a saturation 8. Ferritin " 78.6.    Dated March 4, 2017 hepatitis A IgM negative, hepatitis B surface antigen negative, hepatitis B surface antibody negative, hepatitis B core IgM negative, hepatitis B core total negative, hepatitis C antibody negative.     Alpha-1 antitrypsin level 142 ELIEZER profile negative. Anti-small muscle antibody negative. Anti-mitochondrial antibody negative.     Dated March 6, 2017 white count 6.09 hemoglobin 9.4 hematocrit 30.3 and platelet count 164.  Sodium 138 potassium 4.1 chloride 101 CO2 28 BUN 59 creatinine 2.0 calcium 8.2 albumen 3.1 alkaline phosphatase 165   and T bili 1.0.     Assessment:    1. Abnormal liver function tests.  Improving.  Initial elevation of AST and ALT around 1000.  Etiology unclear.  Noninvasive liver workup so far has been negative.  The patient's daughter has autoimmune hepatitis.  Differential includes chemical/drug induced and autoimmune hepatitis.  Although the patient might have hypotensive episode in the past however overall future is less convincing of ischemic hepatopathy.  LFTs are improving.  2. History of vague periumbilical abdominal pain.  Improved.  3. Constipation.  Multifactorial.  Improved.  4. Anemia likely iron deficiency.  Normal ferritin is likely representing an acute phase reactant.  5. Nausea.  Etiology unclear.  Improved.  6. Underlying rhabdomyolysis.  Clinically improved.  7. Pruritus etiology unclear.  Less likely to be related to liver disease.  Most of her enzyme elevation is hepatocellular.  Now the patient has mild elevation of alkaline phosphatase.  Of interest there is no suggestion of extrahepatic biliary obstruction.  Her CBD measured 5.6 mm which is well within the normal range post cholecystectomy.  No intrahepatic biliary ductal dilatation was seen.  The magnitude of liver enzyme elevation is unlikely to be explained on the basis of congestive hepatopathy.    Recommendations:    1.  Follow-up liver function tests.  2.  Continue with  supportive care.  3.  Symptomatic treatment for pruritus.  4.  Follow-up:  LFTs.  The patient may be followed up in the office in 1-2 weeks.

## 2017-03-07 NOTE — PROGRESS NOTES
"  SUBJECTIVE: Feels better.  Nausea has improved significantly.  Abdominal pain is much better.  There is no overt GI bleeding (hematemesis, melena, or hematochezia).  Oral intake is still so-so.  Appetite is somewhat better though.  Denies reflux.  The patient admits to some difficulty swallowing solids at times.    UNDERLYING CHRONIC ILLNESS:   depression and anxiety.      REVIEW OF SYSTEMS:    Constitutional:  No fever or chills.  CNS: No seizure or recent stroke.  Cardiovascular: The patient denies chest pains.  No palpitations.  Pulmonary: Occasional cough.  Denies shortness of breath.    Liver:  no jaundice.    Rheumatologic:The patient has some arthralgias.    Skin: Significant pruritus involving both arms and legs.    Renal:  the patient is making urine.  Denies dysuria.    Psychiatric: Depression and anxiety.  Improved.  There is confusion at times.    Nutrition: the patient is eating solid food however the oral intake is still so-so.  Activities: mostly staying in bed.      OBJECTIVE:    Alert and oriented ×3.  No apparent distress.      Vital signs:    Blood pressure 127/59, pulse 65, temperature 98.4 °F (36.9 °C), temperature source Oral, resp. rate 20, height 65\" (165.1 cm), weight 189 lb 9.6 oz (86 kg), SpO2 93 %.      HEENT:   No icterus.  Appears to be pale.    Neck:  no JVD.  No thyromegaly. Supple.    Chest:  clear to auscultation.    Cardiovascular:  no S3 no murmurs.    Abdomen: soft.  Bowel sounds present.  Nondistended.  Nontender.  No guarding or rigidity.  No hepatosplenomegaly.  No clinical ascites.  No mass is felt.    Extremities: No edema.  Rectal:  deferred.    Stigmata of chronic liver disease: None.  Neurological: No fevers focal motor neurological deficit.  Rheumatologic:  no obvious joint swelling.      Laboratory tests:  Dated March 3, 2017 serum iron 33 TIBC 423% age a saturation 8.  Ferritin 78.6.      Dated March 4, 2017 hepatitis A IgM negative, hepatitis B surface antigen " negative, hepatitis B surface antibody negative, hepatitis B core IgM negative, hepatitis B core total negative, hepatitis C antibody negative.    Alpha-1 antitrypsin level 142  ELIEZER profile negative.  Anti-small muscle antibody negative.  Anti-mitochondrial antibody negative.    Dated March 6, 2017 white count 6.09 hemoglobin 9.4 hematocrit 30.3 and platelet count 164.  Sodium 138 potassium 4.1 chloride 101 CO2 28 BUN 59 creatinine 2.0 calcium 8.2 albumen 3.1 alkaline phosphatase 165   and T bili 1.0.    Assessment:    1.  Abnormal liver function tests.  Improving.  Initially the patient had significant elevation of ALT and AST thousand range.  Etiology unclear.  Noninvasive liver workup so far has been negative.  The patient's daughter has autoimmune hepatitis.  Although, it is possible that the patient may have autoimmune hepatitis with negative blood tests however improvement of LFTs  without intervention is unlikely.    2.  History of vague periumbilical abdominal pain.  Improved.  3.  Constipation.  Multifactorial.  Improved.  4.  Anemia likely iron deficiency.  Normal ferritin is likely representing an acute phase reactant.  5.  Nausea.  Etiology unclear.  Improved.  6.  Likely the patient had underlying rhabdomyolysis.  Clinically improved.  The patient was noted to have some bruising on the back.  7.  Pruritus.  Etiology unclear.  Less likely to be related to liver.  Her dominant picture is hepatocellular with minimal elevation of alkaline phosphatase.  Overall picture is not orthostatic.  8.  There is no biliary obstruction.  The patient is status post cholecystectomy.  CBD measuring 5.6 mm.      Recommendations:    1.  Supportive care.  2.  IV fluids.  3.  Follow-up liver function tests.  4.  The patient may be treated symptomatically for pruritus.  5.  Discussed with the patient in detail.  Opportunity was given to ask questions.

## 2017-03-07 NOTE — PROGRESS NOTES
Continued Stay Note  CLAUDIA Fofana     Patient Name: Yudy Vásquez  MRN: [id number]  Today's Date: 3/7/2017    Admit Date: 3/2/2017          Discharge Plan       03/07/17 1431    Case Management/Social Work Plan    Plan SW spoke to patient due to LACE status. She states she plans to go to [facility] for rehab. Denies other needs. Will discuss with CM, also.      Patient/Family In Agreement With Plan yes    Additional Comments Following due to LACE status    Final Note    Final Note Following due to LACE status.               Discharge Codes     None        Expected Discharge Date and Time     Expected Discharge Date Expected Discharge Time    Mar 6, 2017             Adriana Verdin

## 2017-03-07 NOTE — SIGNIFICANT NOTE
03/07/17 1443   Rehab Treatment   Discipline physical therapy assistant   Treatment Not Performed patient/family decline treatment, pt not feeling well

## 2017-03-07 NOTE — SIGNIFICANT NOTE
03/07/17 1057   Rehab Treatment   Discipline physical therapy assistant   Treatment Not Performed patient/family declined treatment  (Pt declined treatment this am d/t sleepless night. Will attempt to f/u after lunch. )

## 2017-03-08 PROBLEM — K72.00 ACUTE NONINFECTIVE HEPATITIS: Status: ACTIVE | Noted: 2017-01-01

## 2017-03-08 NOTE — PLAN OF CARE
Problem: Fall Risk (Adult)  Goal: Absence of Falls  Outcome: Outcome(s) achieved Date Met:  03/08/17 03/07/17 2138   Fall Risk (Adult)   Absence of Falls making progress toward outcome         Problem: NPPV/CPAP (Adult)  Goal: Signs and Symptoms of Listed Potential Problems Will be Absent or Manageable (NPPV/CPAP)  Outcome: Ongoing (interventions implemented as appropriate)    03/08/17 0028   NPPV/CPAP   Problems Assessed (NPPV/CPAP) skin breakdown;situational response   Problems Present (NPPV/CPAP) none

## 2017-03-08 NOTE — SIGNIFICANT NOTE
03/08/17 1103   Rehab Treatment   Discipline occupational therapist   Treatment Not Performed other (see comments)  (Pt. groggy secondary to medication per RN and pt. requested that OT come back at a later time for therapy)

## 2017-03-08 NOTE — PLAN OF CARE
Problem: NPPV/CPAP (Adult)  Intervention: Monitor/Manage Anxiety Related to NPPV/CPAP    03/08/17 0028   Coping Strategies   Trust Relationship/Rapport care explained         Goal: Signs and Symptoms of Listed Potential Problems Will be Absent or Manageable (NPPV/CPAP)  Outcome: Ongoing (interventions implemented as appropriate)    03/08/17 0028   NPPV/CPAP   Problems Assessed (NPPV/CPAP) skin breakdown;situational response   Problems Present (NPPV/CPAP) none

## 2017-03-08 NOTE — PLAN OF CARE
Problem: Fall Risk (Adult)  Goal: Identify Related Risk Factors and Signs and Symptoms  Outcome: Outcome(s) achieved Date Met:  03/07/17  Goal: Absence of Falls  Outcome: Ongoing (interventions implemented as appropriate)

## 2017-03-08 NOTE — NURSING NOTE
SWATI Teaching for renal failure and pt able to give teachback regarding when to call the doctor or come to ER with what signs and symptoms

## 2017-03-08 NOTE — NURSING NOTE
Patient being transferred to [facility] and fearful of not getting her prescriptions of xanax. I spoke to Dr. Seaman who wrote a hard prescription and stated ok to give to daughter to have her fill prescription and take to [place]. No other problems noted.

## 2017-03-08 NOTE — DISCHARGE SUMMARY
Date of Discharge:  3/8/2017    Discharge Diagnosis:     Acute renal failure, unspecified acute renal failure type [N17.9]   Acute noninfectious hepatitis  Severe pruritus  Hyponatremia  Hyperkalemia  Anemia  Coagulopathy  Volume overload    Chronic problems  Ischemic cardiomyopathy with ejection fraction of less than 30%; status post AICD placement  Coronary artery disease with stent placement in 2008  Diabetes mellitus type 2   Hypertension  Chronic severe anxiety disorder  Chronic obstructive pulmonary disease on home oxygen  Restless leg syndrome  Chronic recurrent constipation          Presenting Problem/History of Present Illness  Patient presented to the ED as described in the ED provider note with a feeling of impending doom after having been out of her Xanax for 3 days.  Evaluation included lab work which revealed acute renal failure, hyperkalemia and acute hepatitis.     Hospital Course  Patient is a 72 y.o. female presented with as described in the H&P and above.  The plan on admission was to hold nephrotoxic and renally cleared medications, to perform a liver ultrasound, to perform hepatitis testing and testing for autoimmune conditions that can lead to hepatitis.  Dr. Adler from the nephrology service was consulted.  Dr. Dempsey of the gastroenterology service was consulted.    Interruption of the above medications and intravenous hydration led to gradual then more rapid improvement in the patient's laboratory abnormalities.  Her intense pruritus of the arms continued  The patient became mildly volume overloaded. [Ejection fraction less than 30%] and was diuresed gently.  Creatinine continued to improve.  The patient's breathing continued to improve and her oxygen requirement decreased dramatically to baseline.    Laboratory studies directed at finding and origin for this patient's hepatitis have been thus far negative.  The clinical picture and laboratory pattern really fits best with severe hepatic  congestion secondary to volume overload in the setting of acute renal failure.  In retrospect this was either induced by or at least worsened by her medications.    Procedures Performed  Venous ultrasound of the left lower extremity negative for DVT       Consults:   Consults     Date and Time Order Name Status Description    3/3/2017 0357 Inpatient Consult to Gastroenterology      3/3/2017 0357 Inpatient Consult to Nephrology Completed           Pertinent Test Results: LFTs are rapidly normalizing and bilirubin has normalized.  Patient's creatinine has normalized.  Her anemia is stable.    Condition on Discharge: Good and Stable    Vital Signs  Temp:  [98 °F (36.7 °C)-98.2 °F (36.8 °C)] 98.2 °F (36.8 °C)  Heart Rate:  [72-78] 72  Resp:  [16-20] 16  BP: (136-147)/(48-72) 143/58    Physical Exam:     General Appearance:    Alert, lucid interactive and helpful with the interview, in no acute distress; the patient's daughter was present for the entire encounter    Head:    Normocephalic, without obvious abnormality, atraumatic   Eyes:            Lids and lashes normal, conjunctivae and sclerae normal, no   icterus, no pallor, corneas clear   Ears:    Ears appear intact with no external abnormalities noted besides apparent Joie sign   Throat:   No oral lesions, no thrush, oral mucosa moist   Neck:   No adenopathy, supple, trachea midline, no thyromegaly, no     carotid bruit   Back:     No kyphosis present, no scoliosis present, no skin lesions,       erythema or scars, no tenderness to percussion or                   palpation,   range of motion normal   Lungs:     Clear to auscultation,respirations regular, even and                   unlabored    Heart:    Regular rhythm and normal rate, normal S1 and S2, no            murmur, no gallop, no rub, no click       Abdomen:     Normal bowel sounds, soft non-tender, non-distended, no guarding, no rebound tenderness       Extremities:   Moves all extremities well, no  edema, no cyanosis, no              redness   Pulses:   Pulses palpable and equal bilaterally   Skin:   No bleeding or bruising but there are excoriations on forearms which are improving and none appear infected               Discharge Disposition  Skilled Nursing Facility (DC - External)    Discharge Medications    STOP LOSARTAN  STOP AUGMENTIN  RESUME METFORMIN ON Thursday 3/9      Discharge Diet: Low sodium    Activity at Discharge: As tolerated    Follow-up Appointments  Future Appointments  Date Time Provider Department Center   3/10/2017 12:30 PM [facility] 1  [facility]IS [facility]   4/24/2017 1:15 PM Nicolás Mendoza MD [facility] None     The patient needs to be seen in the office of her primary care provider within one week for post hospitalization follow-up.  She will need to see Dr. Fields in the office within 2 weeks.  She will need to see Dr. Adler in the office in 1 week.    THE PATIENT NEEDS TO HAVE FOLLOW-UP LABS DONE          Zbigniew Seaman MD  03/08/17  4:20 PM    Time: 45 min

## 2017-03-08 NOTE — PROGRESS NOTES
"Nephrology Progress Note.    LOS: 5 days    Patient Care Team:  Phuong Vera MD as PCP - General    Chief Complaint:    Chief Complaint   Patient presents with   • Muscle Pain       Subjective   Patient seen and examined this morning.  Events from last night noted.  Patient denies having any fevers chills.  No nausea or vomiting no abdominal pain.  Denies any chest pain or shortness of breath, cough or sputum production.  There is no significant edema noted.  Patient also denies having new onset weakness of numbness of either extremity.  She does think that she is  significantly better than yesterday.  She is complaining of itching and with new medications that was given yesterday it has improved significantly.  She also complaining of bloating and and please like she's been eating much better.  Details about rehabilitation placement was discussed with her she agrees that she would not plan on trying to get to a rehabilitation once she is stable.  All these details were discussed with the hospitalist service to start making the arrangements.      Review of Systems:    The pertinent  ROS was done and it is noted above, rest  was negative.    Objective     Vital Signs  Visit Vitals   • /58 (BP Location: Right arm, Patient Position: Lying)   • Pulse 72   • Temp 98.2 °F (36.8 °C) (Oral)   • Resp 16   • Ht 65\" (165.1 cm)   • Wt 185 lb 6.4 oz (84.1 kg)   • SpO2 98%   • BMI 30.85 kg/m2         I/O this shift:  In: 240 [P.O.:240]  Out: 650 [Urine:650]    Intake/Output Summary (Last 24 hours) at 03/08/17 1107  Last data filed at 03/08/17 0937   Gross per 24 hour   Intake    480 ml   Output   1450 ml   Net   -970 ml       Physical Exam:    General Appearance: alert, oriented x 3, no acute distress,   HEENT: pupils round and reactive to light, oral mucosa dry, extra occular movements intact.  Neck: supple, no JVD, trachea midline  Lungs: Clear to Auscultation she does have occasional basal crackles, unlabored " breathing effort  Heart: RRR, normal S1 and S2, no S3, no rub  Abdomen: Obese soft, non-tender, no palpable bladder, present bowel sounds to auscultation  Extremities: Trace to 1+ edema, no cyanosis or clubbing.   Neuro: normal speech and mental status, grossly non focal.     Results Review:      Results from last 7 days  Lab Units 03/08/17  0520 03/06/17  1721 03/06/17  1600  03/05/17  0640   SODIUM mmol/L 146* 138  --   --  133*   POTASSIUM mmol/L 3.9 4.1 3.9  < > 5.5*  5.5*   CHLORIDE mmol/L 99 101  --   --  100   TOTAL CO2 mmol/L 40.0* 28.0  --   --  26.0   BUN mg/dL 36* 59*  --   --  66*   CREATININE mg/dL 1.00 2.00*  --   --  2.50*   CALCIUM mg/dL 8.9 8.2*  --   --  8.4   BILIRUBIN mg/dL 0.9 1.0  --   --  1.8*   ALK PHOS U/L 148* 165*  --   --  205*   ALT (SGPT) U/L 515* 713*  --   --  1063*   AST (SGOT) U/L 162* 399*  --   --  980*   GLUCOSE mg/dL 167* 264*  --   --  96   < > = values in this interval not displayed.    Estimated Creatinine Clearance: 53.6 mL/min (by C-G formula based on Cr of 1).      Results from last 7 days  Lab Units 03/08/17  0520 03/05/17  0640 03/04/17  0400 03/03/17  0606   MAGNESIUM mg/dL  --   --   --  1.8   PHOSPHORUS mg/dL 3.0 5.4* 5.7*  --      Results for ASHLIE VALENTIN (MRN [id number]) as of 3/4/2017 07:46   Ref. Range 3/3/2017 06:07   Iron Latest Ref Range: 37 - 181 mcg/dL 33 (L)   Ferritin Latest Ref Range: 11.10 - 264.00 ng/mL 78.60   Iron Saturation Latest Ref Range: 11 - 46 % 8 (L)   TIBC Latest Ref Range: 261 - 497 mcg/dL 423             Results from last 7 days  Lab Units 03/08/17  0520 03/06/17  1721 03/05/17  0640 03/04/17  0400 03/03/17  0514   WBC 10*3/mm3 7.01 6.29 8.86 8.50 7.93   HEMOGLOBIN g/dL 9.5* 9.4* 10.9* 10.5* 9.9*   PLATELETS 10*3/mm3 165 164 193 209 244     Results for ASHLIE VALENTIN (MRN [id number]) as of 3/4/2017 07:46   Ref. Range 3/3/2017 05:14   Reticulocyte % Latest Ref Range: 0.50 - 1.50 % 4.21 (H)       Results from last 7 days  Lab  Units 03/05/17  0640 03/04/17  0007 03/02/17  2309   INR  1.46* 1.56* 1.83*         Imaging Results (last 24 hours)     Procedure Component Value Units Date/Time    US Liver [[id number]] Collected:  03/03/17 0921     Updated:  03/03/17 0923    Narrative:       PROCEDURE: US LIVER-     HISTORY: elevated lfts; N17.9-Acute kidney failure, unspecified;  R74.8-Abnormal levels of other serum enzymes; E87.5-Hyperkalemia     PROCEDURE: Ultrasound images of the right upper quadrant were obtained.     FINDINGS: Limited images of the pancreas are unremarkable. The liver  parenchyma is normal in echogenicity. The gallbladder is surgically  absent.  There is no ascites.  The common duct measures 5.6 mm      .  Limited images of the right kidney are unremarkable.       Impression:       Unremarkable right upper quadrant ultrasound.     This report was finalized on 3/3/2017 9:21 AM by Mercedes Zhou M.D..    US Venous Doppler Lower Extremity Left (duplex) [[id number]] Collected:  03/03/17 1553     Updated:  03/03/17 1556    Narrative:       PROCEDURE: US VENOUS DOPPLER LOWER EXTREMITY LEFT (DUPLEX)-     HISTORY: edema left leg; N17.9-Acute kidney failure, unspecified;  R74.8-Abnormal levels of other serum enzymes; E87.5-Hyperkalemia;  Z74.09-Other reduced mobility; Z74.09-Other reduced mobility     PROCEDURE: Multiple transverse and longitudinal scans were performed of  the femoropopliteal deep venous system, with augmentation and  compression maneuvers.     FINDINGS: Normal phasic flow was noted in the visualized deep venous  system. No intraluminal increased echogenicity is noted to suggest  thrombus. There is normal compression and augmentation of the venous  structures. No abnormal venous collaterals are seen. No venous reflux is  demonstrated.       Impression:       No evidence of lower extremity DVT.     This report was finalized on 3/3/2017 3:54 PM by Mercedes Zhou M.D..          budesonide-formoterol 2 puff  Inhalation BID - RT   buPROPion  mg Oral QAM   clopidogrel 75 mg Oral Daily   docusate sodium 100 mg Oral BID   enoxaparin 30 mg Subcutaneous Daily   escitalopram 10 mg Oral Daily   furosemide 20 mg Oral BID   hydrocortisone  Topical Q12H   insulin aspart 0-7 Units Subcutaneous 4x Daily AC & at Bedtime   insulin detemir 20 Units Subcutaneous Nightly   ipratropium-albuterol 3 mL Nebulization Q6H - RT   lactulose 20 g Oral BID   levothyroxine 125 mcg Oral Q AM   mirtazapine 45 mg Oral Nightly   neomycin-bacitracin-polymyxin  Topical Q8H   pantoprazole 40 mg Oral BID   rOPINIRole 1 mg Oral Nightly          Medication Review:   Current Facility-Administered Medications   Medication Dose Route Frequency Provider Last Rate Last Dose   • ALPRAZolam (XANAX) tablet 0.5 mg  0.5 mg Oral Q6H PRN Mariya Guzman DO   0.5 mg at 03/08/17 0946   • ammonium lactate (LAC-HYDRIN) 12 % lotion   Topical BID PRN Mariya Guzman DO       • bisacodyl (DULCOLAX) suppository 10 mg  10 mg Rectal Daily PRN Cezar Panda DO   10 mg at 03/07/17 1951   • budesonide-formoterol (SYMBICORT) 160-4.5 MCG/ACT inhaler 2 puff  2 puff Inhalation BID - RT Cezar Panda DO   2 puff at 03/05/17 1905   • buPROPion XL (WELLBUTRIN XL) 24 hr tablet 150 mg  150 mg Oral QAM Cezar Panda DO   150 mg at 03/08/17 0632   • CHAPSTICK 1 each  1 each Apply externally 4x Daily PRN Mariya Guzman DO   1 each at 03/03/17 1151   • clonazePAM (KlonoPIN) tablet 0.5 mg  0.5 mg Oral Nightly PRN Mariya Guzman DO   0.5 mg at 03/08/17 0056   • clopidogrel (PLAVIX) tablet 75 mg  75 mg Oral Daily Cezar Panda DO   75 mg at 03/08/17 0833   • dextrose (D50W) solution 25 g  25 g Intravenous Q15 Min PRN Cezar Panda DO       • dextrose (INSTA-GLUCOSE) 77.4 % gel 1 tube  1 tube Oral Q15 Min PRN Cezar Panda DO       • docusate sodium (COLACE) capsule 100 mg  100 mg Oral BID Cezar Panda DO   100 mg at 03/08/17 0882   • enoxaparin  (LOVENOX) syringe 30 mg  30 mg Subcutaneous Daily Cezar Panda DO   30 mg at 03/08/17 0836   • escitalopram (LEXAPRO) tablet 10 mg  10 mg Oral Daily Cezar Panda DO   10 mg at 03/08/17 0833   • furosemide (LASIX) tablet 20 mg  20 mg Oral BID Dallas Adler MD   20 mg at 03/08/17 0832   • glucagon (human recombinant) (GLUCAGEN DIAGNOSTIC) injection 1 mg  1 mg Subcutaneous Q15 Min PRN Cezar Panda DO       • hydrocortisone 2.5 % cream   Topical Q12H Zbigniew Seaman MD       • hydrOXYzine (VISTARIL) capsule 25 mg  25 mg Oral 4x Daily PRN Cezar Panda DO   25 mg at 03/08/17 0946   • insulin aspart (novoLOG) injection 0-7 Units  0-7 Units Subcutaneous 4x Daily AC & at Bedtime Cezar Panda DO   2 Units at 03/08/17 0632   • insulin detemir (LEVEMIR) injection 20 Units  20 Units Subcutaneous Nightly Cezar Panda DO   20 Units at 03/07/17 2137   • ipratropium-albuterol (DUO-NEB) nebulizer solution 3 mL  3 mL Nebulization Q6H - RT Cezar Panda DO   3 mL at 03/07/17 0807   • ipratropium-albuterol (DUO-NEB) nebulizer solution 3 mL  3 mL Nebulization Q4H PRN Mariya Guzman DO       • lactulose solution 20 g  20 g Oral BID Cezar Panda DO   20 g at 03/08/17 0834   • levothyroxine (SYNTHROID, LEVOTHROID) tablet 125 mcg  125 mcg Oral Q AM Cezar Panda DO   125 mcg at 03/08/17 0632   • melatonin tablet 4.5 mg  4.5 mg Oral Nightly PRN Mariya Guzman DO   4.5 mg at 03/08/17 0056   • mirtazapine (REMERON) tablet 45 mg  45 mg Oral Nightly Cezar Panda DO   45 mg at 03/07/17 2138   • neomycin-bacitracin-polymyxin (NEOSPORIN) ointment   Topical Q8H Mariya Guzman DO       • ondansetron (ZOFRAN) injection 4 mg  4 mg Intravenous Q6H PRN Cezar Panda DO       • ondansetron (ZOFRAN) injection 4 mg  4 mg Intravenous Q6H PRN Mariya Guzman DO   4 mg at 03/05/17 0007   • pantoprazole (PROTONIX) EC tablet 40 mg  40 mg Oral BID Cezar Panda,    40 mg at  03/08/17 0832   • polyethylene glycol (MIRALAX) powder 17 g  17 g Oral Daily PRN Cezar Panda, DO   17 g at 03/04/17 1254   • rOPINIRole (REQUIP) tablet 1 mg  1 mg Oral Nightly Cezar Panda, DO   1 mg at 03/07/17 2138   • sodium chloride 0.9 % flush 1-10 mL  1-10 mL Intravenous PRN Cezar Panda DO           Assessment/Plan     1. Acute renal failure: Clinically doing better and renal function is also improving.  Continue follow daily labs.  2. Anemia of chronic disease: Workup as ordered she may need to further GI workup, it appears she will need to be assessed by the gastroenterologist for abnormal liver function and probably will need EGD and colonscopy done as well, she has low iron stores, she will benefit from IV iron.  If there is no signs of infection we will go ahead and start the iron infusions and which can be continued as an outpatient.  Gastroenterology has been consulted for further evaluation and treatment.    3. Chronic kidney disease (CKD) stage G3a/A1, moderately decreased glomerular filtration rate (GFR) between 45-59 mL/min/1.73 square meter and albuminuria creatinine ratio less than 30 mg/g: She has known to have baseline chronic kidney disease stage III does have proteinuria.  We will see where she will finally settled down as far as her renal function.  4. Abnormal liver functions: Gastroenterology evaluation is in progress, Since her diuretics the liver function is improving.  Continue to follow labs, liver function continues to improve every time they've been checked.  5. Chronic coronary artery disease: She does not had any acute symptoms.  6. Chronic systolic CHF (congestive heart failure), NYHA class 3: Clinically appears to be stable.  She is not getting any IV fluids at this point.  I will go ahead and start Aldactone and hold the Lasix for the time being.  7. Hypernatremia: Since she's been on loop diuretics sodium has gone up will continue with the Aldactone for the  time being and reassess for the use of loop diuretics once she is little more stable.  8. Controlled type 2 diabetes mellitus with circulatory disorder, with long-term current use of insulin: Continue current treatment plan.  9. Essential hypertension: It appears to be under fair control at this point depending on her blood pressure will have to be reassessed on day-to-day basis will hold off using any angiotensin receptor blocker at this point. She will likely benefit from it once the volume status is more stable.  10. Ischemic cardiomyopathy: Continue to optimize medications.  11. GERD (gastroesophageal reflux disease): Treated clinically appears to be stable  12. 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 attack: Home medications have been restarted.            Details were discussed with the patient as well as with the hospitalist service.    Rest as ordered    Dallas Adler MD  03/08/17  11:07 AM  Please note that portions of this note may have been completed with a voice recognition program. Efforts were made to edit the dictations, but occasionally words are mistranscribed.

## 2017-03-09 NOTE — THERAPY DISCHARGE NOTE
Acute Care - Physical Therapy Discharge Summary   [facility]       Patient Name: Yudy Vásquez  : 1945  MRN: [id number]    Today's Date: 3/9/2017  Onset of Illness/Injury or Date of Surgery Date: 17    Date of Referral to PT: 17  Referring Physician: Dr. Panda      Admit Date: 3/2/2017      PT Recommendation and Plan    Visit Dx:    ICD-10-CM ICD-9-CM   1. Acute renal failure, unspecified acute renal failure type N17.9 584.9   2. Elevated liver enzymes R74.8 790.5   3. Hyperkalemia E87.5 276.7   4. Impaired functional mobility, balance, gait, and endurance Z74.09 V49.89   5. Impaired mobility and ADLs Z74.09 799.89   6. Ischemic cardiomyopathy I25.5 414.8   7. Chronic systolic CHF (congestive heart failure), NYHA class 3 I50.22 428.22     428.0                       IP PT Goals       17 0754 17 1000 17 1201    Transfer Training PT LTG    Transfer Training PT LTG, Date Established   17  -LM    Transfer Training PT LTG, Time to Achieve   2 wks  -LM    Transfer Training PT LTG, Activity Type   bed to chair /chair to bed;sit to stand/stand to sit  -LM    Transfer Training PT LTG, [place] Level   conditional independence  -LM    Transfer Training PT LTG, Assist Device   walker, rolling  -LM    Transfer Training PT LTG, Outcome goal partially met  -LM goal partially met  -CC goal ongoing  -LM    Transfer Training PT LTG, Reason Goal Not Met discharged from facility  -LM      Gait Training PT LTG    Gait Training Goal PT LTG, Date Established   17  -LM    Gait Training Goal PT LTG, Time to Achieve   2 wks  -LM    Gait Training Goal PT LTG, [place] Level   conditional independence  -LM    Gait Training Goal PT LTG, Assist Device   walker, rolling  -LM    Gait Training Goal PT LTG, Distance to Achieve   200  -LM    Gait Training Goal PT LTG, Outcome goal partially met  -LM goal ongoing  -CC goal ongoing  -LM    Gait Training Goal PT LTG, Reason Goal Not Met  discharged from facility  -LM      Strength Goal PT LTG    Strength Goal PT LTG, Date Established   03/03/17  -LM    Strength Goal PT LTG, Time to Achieve   2 wks  -LM    Strength Goal PT LTG, Measure to Achieve   Patient will perform B LE ther ex x 15 reps to improve functional strength for mobility.  -LM    Strength Goal PT LTG, Outcome goal partially met  -LM goal ongoing  -CC goal ongoing  -LM    Strength Goal PT LTG, Reason Goal Not Met discharged from facility  -LM        User Key  (r) = Recorded By, (t) = Taken By, (c) = Cosigned By    Initials Name Provider Type    LM Jessy Monzon, PT Physical Therapist    CC Mayra Wise, PTA Physical Therapy Assistant              PT Discharge Summary  Anticipated Discharge Disposition: inpatient rehabilitation facility  Reason for Discharge: Discharge from facility  Outcomes Achieved: Refer to plan of care for updates on goals achieved  Discharge Destination: Inpatient rehabilitation facility      Jessy Monzon, PT   3/9/2017

## 2017-03-09 NOTE — THERAPY DISCHARGE NOTE
Acute Care - Occupational Therapy Discharge Summary   [facility]     Patient Name: Yudy Vásquez  : 1945  MRN: [id number]    Today's Date: 3/9/2017  Onset of Illness/Injury or Date of Surgery Date: 17    Date of Referral to OT: 17  Referring Physician: Dr. Panda      Admit Date: 3/2/2017        OT Recommendation and Plan    Visit Dx:    ICD-10-CM ICD-9-CM   1. Acute renal failure, unspecified acute renal failure type N17.9 584.9   2. Elevated liver enzymes R74.8 790.5   3. Hyperkalemia E87.5 276.7   4. Impaired functional mobility, balance, gait, and endurance Z74.09 V49.89   5. Impaired mobility and ADLs Z74.09 799.89   6. Ischemic cardiomyopathy I25.5 414.8   7. Chronic systolic CHF (congestive heart failure), NYHA class 3 I50.22 428.22     428.0                     OT Goals       17 1320          Transfer Training OT LTG    Transfer Training OT LTG, Date Established 17  -      Transfer Training OT LTG, Time to Achieve 2 wks  -      Transfer Training OT LTG, Activity Type bed to chair /chair to bed;sit to stand/stand to sit  -      Transfer Training OT LTG, [place] Level conditional independence  -      Transfer Training OT LTG, Assist Device walker, rolling  -      Transfer Training OT LTG, Outcome goal ongoing  -      Strength OT LTG    Strength Goal OT LTG, Date Established 17  -      Strength Goal OT LTG, Measure to Achieve Pt will participate in BUE strengthening ex using theraband for resistance.  -AH      Strength Goal OT LTG, Outcome goal ongoing  -      LB Dressing OT LTG    LB Dressing Goal OT LTG, Date Established 17  -      LB Dressing Goal OT LTG, Time to Achieve 2 wks  -AH      LB Dressing Goal OT LTG, [place] Level set up required  -      LB Dressing Goal OT LTG, Outcome goal ongoing  -      Functional Mobility OT LTG    Functional Mobility Goal OT LTG, Date Established 17  -      Functional Mobility Goal OT  LTG, Time to Achieve 2 wks  -      Functional Mobility Goal OT LTG, [place] Level standby assist  -      Functional Mobility Goal OT LTG, Assist Device rolling walker  -      Functional Mobility Goal OT LTG, Distance to Achieve in hallway  -      Functional Mobility Goal OT LTG, Outcome goal ongoing  -        User Key  (r) = Recorded By, (t) = Taken By, (c) = Cosigned By    Initials Name Provider Type     Katia Dailey Occupational Therapist                  OT Discharge Summary  Anticipated Discharge Disposition: home with home health  Reason for Discharge: Discharge from facility  Outcomes Achieved:  (pt seen for OT evaluation only.)  Discharge Destination: Home with home health      Katia Dailey  3/9/2017

## 2017-03-09 NOTE — PLAN OF CARE
Problem: Patient Care Overview (Adult)  Goal: Plan of Care Review  Outcome: Unable to achieve outcome(s) by discharge Date Met:  03/09/17 03/09/17 0754   Coping/Psychosocial Response Interventions   Plan Of Care Reviewed With patient   Patient Care Overview   Progress progress toward functional goals is gradual   Outcome Evaluation   Outcome Summary/Follow up Plan Patient partially met outlined goals prior to D/c to [facility] for inpatient rehab.         Problem: Inpatient Physical Therapy  Goal: Transfer Training Goal 1 LTG- PT  Outcome: Unable to achieve outcome(s) by discharge Date Met:  03/09/17 03/03/17 1201 03/09/17 0754   Transfer Training PT LTG   Transfer Training PT LTG, Date Established 03/03/17 --    Transfer Training PT LTG, Time to Achieve 2 wks --    Transfer Training PT LTG, Activity Type bed to chair /chair to bed;sit to stand/stand to sit --    Transfer Training PT LTG, [place] Level conditional independence --    Transfer Training PT LTG, Assist Device walker, rolling --    Transfer Training PT LTG, Outcome --  goal partially met   Transfer Training PT LTG, Reason Goal Not Met --  discharged from facility       Goal: Gait Training Goal LTG- PT  Outcome: Unable to achieve outcome(s) by discharge Date Met:  03/09/17 03/03/17 1201 03/09/17 0754   Gait Training PT LTG   Gait Training Goal PT LTG, Date Established 03/03/17 --    Gait Training Goal PT LTG, Time to Achieve 2 wks --    Gait Training Goal PT LTG, [place] Level conditional independence --    Gait Training Goal PT LTG, Assist Device walker, rolling --    Gait Training Goal PT LTG, Distance to Achieve 200 --    Gait Training Goal PT LTG, Outcome --  goal partially met   Gait Training Goal PT LTG, Reason Goal Not Met --  discharged from facility       Goal: Strength Goal LTG- PT  Outcome: Unable to achieve outcome(s) by discharge Date Met:  03/09/17 03/03/17 1201 03/09/17 0754   Strength Goal PT LTG   Strength Goal PT LTG,  Date Established 03/03/17 --    Strength Goal PT LTG, Time to Achieve 2 wks --    Strength Goal PT LTG, Measure to Achieve Patient will perform B LE ther ex x 15 reps to improve functional strength for mobility. --    Strength Goal PT LTG, Outcome --  goal partially met   Strength Goal PT LTG, Reason Goal Not Met --  discharged from facility         03/03/17 1201 03/09/17 0754   Strength Goal PT LTG   Strength Goal PT LTG, Date Established 03/03/17 --    Strength Goal PT LTG, Time to Achieve 2 wks --    Strength Goal PT LTG, Measure to Achieve Patient will perform B LE ther ex x 15 reps to improve functional strength for mobility. --    Strength Goal PT LTG, Outcome --  goal partially met   Strength Goal PT LTG, Reason Goal Not Met --  discharged from facility

## 2017-03-11 NOTE — PROGRESS NOTES
Continued Stay Note   [facility]     Patient Name: Yudy Vásquez  MRN: [id number]  Today's Date: 3/11/2017    Admit Date: 3/2/2017          Discharge Plan       03/11/17 1731    Final Note    Final Note Discharged to [facility] for short term rehab 3/08/17              Discharge Codes     None        Expected Discharge Date and Time     Expected Discharge Date Expected Discharge Time    Mar 8, 2017             Ann Nevarez

## 2017-03-21 PROBLEM — R79.89 ELEVATED LFTS: Status: ACTIVE | Noted: 2017-01-01

## 2017-03-21 PROBLEM — I50.9 CONGESTIVE HEART FAILURE (HCC): Status: ACTIVE | Noted: 2017-01-01

## 2017-03-21 PROBLEM — E11.42 DIABETIC PERIPHERAL NEUROPATHY ASSOCIATED WITH TYPE 2 DIABETES MELLITUS (HCC): Status: ACTIVE | Noted: 2017-01-01

## 2017-03-21 NOTE — ED PROVIDER NOTES
Subjective   HPI Comments: 72-year-old female presenting with shortness of breath and cough.  She states that for the last several days she's had a dry cough and increasing shortness of breath.  There've been no aggravating factors to the shortness of breath including exertion or laying flat.  She also noted she's had some increased swelling in her legs.  She had labs checked today and apparently her BNP was elevated at 1200 so she was sent here for evaluation.  She denies any fevers, chills, nausea, vomiting, chest pain.  She was just recently in the hospital for acute renal insufficiency.  During the hospitalization she received copious amounts of fluid and ended up being fluid overloaded, she was discharged home on every other day Lasix as needed.  She's unsure as to whether or not she's been taking this.      Review of Systems   Constitutional: Negative for chills and fever.   HENT: Negative for congestion, rhinorrhea and sore throat.    Eyes: Negative for pain.   Respiratory: Positive for cough and shortness of breath.    Cardiovascular: Negative for chest pain, palpitations and leg swelling.   Gastrointestinal: Negative for abdominal pain, diarrhea, nausea and vomiting.   Genitourinary: Negative for dysuria.   Musculoskeletal: Negative for arthralgias.   Skin: Negative for rash.   Neurological: Negative for weakness and numbness.   Psychiatric/Behavioral: Negative for behavioral problems.       Past Medical History   Diagnosis Date   • Anemia    • Anxiety    • Arthritis    • Bacterial sinusitis    • Chronic back pain    • Chronic obstructive lung disease    • Colon cancer screening    • Colon polyps       · Last Impression: 09 Dec 2015  History of multiple colon polyps including tubularadenomata.  Maximino Fields (Gastroenterology)   • Constipation    • Depression    • Diarrhea    • Disease of thyroid gland    • Dysphagia 01/20/2013   • Esophageal reflux    • Esophagitis, reflux    • Hernia    • History of  bronchitis    • History of cardiac pacemaker    • History of degenerative disc disease    • History of tobacco abuse    • History of urinary tract infection    • Hyperlipidemia    • Hypothyroidism    • Myocardial infarction    • Nausea    • Occult blood in stools      Impression: 12/09/2015 - This could be multifactorial including secondary to small nasal bleed.  However the small nasal bleed does not explain anemia as such.  The patient previously had undergone a colonoscopy in February 2014.  However unfortunately the prep was considered to be suboptimal.;    • Pancreatitis      status post ERCP for common duct stone in 2001   • Renal disorder    • RLS (restless legs syndrome)        Allergies   Allergen Reactions   • Amphetamine-Dextroamphetamine    • Lorazepam    • Other      No Known Food Allergy       Past Surgical History   Procedure Laterality Date   • Back surgery  1992   • Colonoscopy  2009     Fiberoptic (Onset Date: 2009)   • Internal cardiac defibrillator insertion       Dual Lead Cardioverter-Defibrilator   • Coronary stent placement  1994   • Cardiac pacemaker placement     • Cardiac catheterization     • Pacemaker implantation  1994   • Cholecystectomy  1968   • Tonsillectomy  1955   • Hysterectomy  1998       Family History   Problem Relation Age of Onset   • Dementia Mother    • Pneumonia Mother    • COPD Father    • Emphysema Father    • Lung cancer Sister    • Cancer Sister    • COPD Sister        Social History     Social History   • Marital status:      Spouse name: N/A   • Number of children: N/A   • Years of education: N/A     Social History Main Topics   • Smoking status: Former Smoker   • Smokeless tobacco: Never Used   • Alcohol use No   • Drug use: No   • Sexual activity: No     Other Topics Concern   • None     Social History Narrative   • None           Objective   Physical Exam   Constitutional: She is oriented to person, place, and time. She appears well-developed and  well-nourished. No distress.   HENT:   Head: Normocephalic and atraumatic.   Right Ear: External ear normal.   Left Ear: External ear normal.   Nose: Nose normal.   Mouth/Throat: Oropharynx is clear and moist.   Eyes: Conjunctivae and EOM are normal. Pupils are equal, round, and reactive to light.   Neck: Normal range of motion. Neck supple.   I cannot appreciate any JVD   Cardiovascular: Normal rate, regular rhythm, normal heart sounds and intact distal pulses.    Pulmonary/Chest: Effort normal.   No increased work of breathing,  rales throughout both lung fields   Abdominal: Soft. Bowel sounds are normal. She exhibits no distension. There is no tenderness. There is no rebound and no guarding.   Musculoskeletal: Normal range of motion. She exhibits no tenderness or deformity.   Pitting edema at the knees bilaterally   Neurological: She is alert and oriented to person, place, and time.   Skin: Skin is warm and dry. No rash noted.   Psychiatric: She has a normal mood and affect. Her behavior is normal.   Nursing note and vitals reviewed.      Procedures         ED Course  ED Course                  MDM  Number of Diagnoses or Management Options  Congestive heart failure, unspecified congestive heart failure chronicity, unspecified congestive heart failure type:   Elevated troponin:   Diagnosis management comments: 72-year-old female with cough and shortness of breath.  Likely ill-appearing elderly female in no distress with normal vital signs and exam as above.  She does have some pitting edema and rales.  Given her recent history and the fact that she may or may not been taking her Lasix I think that is likely the etiology of her symptoms.  We will check labs, EKG, chest x-ray.  Disposition pending workup.  -labs  -ekg  -cxr    Ddx: chf, acs, volume overload, medication non compliance    EKG: Sinus bradycardia, left axis, no ST changes    Labs notable for elevated troponin and BNP.  Chest x-ray is consistent with  pulmonary edema.  I've given her 40 mg Lasix.  Discussed with hospitalist for admission.      Final diagnoses:   Congestive heart failure, unspecified congestive heart failure chronicity, unspecified congestive heart failure type   Elevated troponin            Anderson Campoverde MD  03/21/17 4725

## 2017-03-21 NOTE — ED NOTES
Son gives contact info: Helen Duarte [phone]. Silvio Duarte [phone].     Gómez Castañeda RN  03/21/17 5492

## 2017-03-22 NOTE — PLAN OF CARE
Problem: Cardiac: Heart Failure (Adult)  Goal: Signs and Symptoms of Listed Potential Problems Will be Absent or Manageable (Cardiac: Heart Failure)  Outcome: Ongoing (interventions implemented as appropriate)    03/22/17 0356   Cardiac: Heart Failure   Problems Assessed (Heart Failure) cardiac pump dysfunction;fluid/electrolyte imbalance;situational response   Problems Present (Heart Failure) fluid/electrolyte imbalance;cardiac pump dysfunction;situational response         Problem: Fall Risk (Adult)  Goal: Identify Related Risk Factors and Signs and Symptoms  Outcome: Ongoing (interventions implemented as appropriate)    03/22/17 0356   Fall Risk   Fall Risk: Related Risk Factors age-related changes;confusion/agitation;history of falls   Fall Risk: Signs and Symptoms presence of risk factors       Goal: Absence of Falls  Outcome: Ongoing (interventions implemented as appropriate)    03/22/17 0356   Fall Risk (Adult)   Absence of Falls making progress toward outcome

## 2017-03-22 NOTE — H&P
Active Problems:    Congestive heart failure    Chronic coronary artery disease    Chronic systolic CHF (congestive heart failure), NYHA class 3    Elevated LFTs    Diabetic peripheral neuropathy associated with type 2 diabetes mellitus          Assessment/Plan     Findings are consistent with acute and chronic systolic congestive heart failure she will be placed on IV furosemide on a twice a day basis.  Cardiology will be consulted      Chief complaint   This 72-year-old white female had recently been hospitalized here on March 2 with acute renal failure the creatinine had risen to 3.2.  She was seen by nephrology given IV fluids and her creatinine eventually normalized was discharged to rehabilitation facility where she developed increasing lower extremity edema increasing cough and shortness of breath and was sent back to emergency department where she is found to have evidence of congestive heart failure by history physical and chest radiograph.  Therefore she is being readmitted.  Has a previous history of heart disease with left ventricular ejection fraction of 30% previous history of coronary artery disease and stent placement followed by Dr. Tucker cardiology.    Subjective     Been progressive shortness of breath cough nonproductive no chest pain.  No syncope or near-syncope she's never been shocked by her AICD there has been increasing lower extremity edema    Review of Systems   She denies fever sweats but complains of chills she's had a mild headache and lightheadedness no earache or sore throat there been no chest pain but feels she's had shortness of breath and nonproductive cough .  She denies melanoma or hematochezia  does have nausea and constipation and has a history of previous colon polyps last colonoscopy 2016.  She complains of burning on urination without hematuria or enuresis had a numbness sensation in her extremities for chronically there is a 20 year history of diabetes she's  increasing lower extremity edema with erythema around the ankles and lower calves or scabs over the left knee and toes from a recent fall    History  Past Medical History   Diagnosis Date   • Anemia    • Anxiety    • Arthritis    • Bacterial sinusitis    • Chronic back pain    • Chronic coronary artery disease 5/12/2016 1996 PTCA of RCA.  2004 PTCA cutting balloon and Express stenting of RCA.  October 2007, Taxus YANDY to proximal and mid-RCA per LCB.  09/10/2008 LHC, nonobstructive plaque. EF 55%.  December 2014, echocardiogram [facility], EF 35% with RVSP of 56 and aortic sclerosis.     • Chronic obstructive lung disease    • Colon cancer screening    • Colon polyps       · Last Impression: 09 Dec 2015  History of multiple colon polyps including tubularadenomata.  Maximino Fields (Gastroenterology)   • Constipation    • Depression    • Diarrhea    • Disease of thyroid gland    • Dysphagia 01/20/2013   • Esophageal reflux    • Esophagitis, reflux    • Hernia    • History of bronchitis    • History of cardiac pacemaker    • History of degenerative disc disease    • History of tobacco abuse    • History of urinary tract infection    • Hyperlipidemia    • Hypothyroidism    • Myocardial infarction    • Nausea    • Occult blood in stools      Impression: 12/09/2015 - This could be multifactorial including secondary to small nasal bleed.  However the small nasal bleed does not explain anemia as such.  The patient previously had undergone a colonoscopy in February 2014.  However unfortunately the prep was considered to be suboptimal.;    • Pancreatitis      status post ERCP for common duct stone in 2001   • Renal disorder    • RLS (restless legs syndrome)      Past Surgical History   Procedure Laterality Date   • Back surgery  1992   • Colonoscopy  2009     Fiberoptic (Onset Date: 2009)   • Internal cardiac defibrillator insertion       Dual Lead Cardioverter-Defibrilator   • Coronary stent placement  1994    • Cardiac pacemaker placement     • Cardiac catheterization     • Pacemaker implantation  1994   • Cholecystectomy  1968   • Tonsillectomy  1955   • Hysterectomy  1998     Family History   Problem Relation Age of Onset   • Dementia Mother    • Pneumonia Mother    • COPD Father    • Emphysema Father    • Lung cancer Sister    • Cancer Sister    • COPD Sister      Social History     Social History   • Marital status:      Spouse name: N/A   • Number of children: N/A   • Years of education: N/A     Occupational History   • Not on file.     Social History Main Topics   • Smoking status: Former Smoker   • Smokeless tobacco: Never Used   • Alcohol use No   • Drug use: No   • Sexual activity: No     Other Topics Concern   • Not on file     Social History Narrative   • No narrative on file           Objective     Vital Signs  Temp:  [98.1 °F (36.7 °C)-98.2 °F (36.8 °C)] 98.1 °F (36.7 °C)  Heart Rate:  [59-61] 59  Resp:  [18-19] 19  BP: (112-129)/(49-62) 112/52    Physical Exam:       General Appearance:    Alert, cooperative   Head:    Normocephalic, without obvious abnormality, atraumatic   Eyes:            Lids and lashes normal, conjunctivae and sclerae normal, no   icterus, no pallor   Ears:    Ears appear intact with no abnormalities noted   Throat:     Neck:   No adenopathy, supple, trachea midline, no thyromegaly, no   carotid bruit   Back:     mild kyphosis present, no scoliosis present, no tenderness    Lungs:     there are bibasilar crackles there is expiratory wheezing present in both lungs no dullness percussion     Heart:    Regular rhythm and normal rate, normal S1 and S2, no            murmur, no gallop, no rub, no click   Chest Wall:    No abnormalities observed   Abdomen:     Normal bowel sounds, no masses, no organomegaly, soft        non-tender, non-distended, no guarding, no rebound                tenderness   Rectal:        Extremities:   2-3  Edema lower extremities, no cyanosis   Pulses:    foot pulses are absent except for a right dorsalis pedis pulse    Skin:   there is erythema involving the feet and distal lower extremities there are scabbed over the left knee and dorsum of the toes of the left foot    Lymph nodes:   No palpable adenopathy   Neurologic:   Cranial nerves 2 - 12 grossly intact       Laurent Almonte MD  03/21/17  8:17 PM

## 2017-03-22 NOTE — PROGRESS NOTES
Continued Stay Note  CLAUDIA Fofana     Patient Name: Yudy Vásquez  MRN: [id number]  Today's Date: 3/22/2017    Admit Date: 3/21/2017          Discharge Plan       03/22/17 1548    Case Management/Social Work Plan    Plan [facility] re[facility]     Additional Comments Pt was sleepy with her premission  spoke to pt daughter .She has been in short term rehab at [facility] re[facility] and confirmed with Francisca she can return  there               Discharge Codes     None            Lavonne Hansen

## 2017-03-22 NOTE — NURSING NOTE
"Pt arrived to floor, placed on telemetry. Alert oriented. Pt states she is \"miserable\" and unable to void. Pt attempted to void on BSC, unsuccessfully. Bladder scan done showing approx 415ml. Dr. Almonte here and informed of pt's complaints. Orders received for I/O cath. Explained orders to pt. I/O cath done without difficulty with 500ml of clear yellow urine results. Pt then verbalized \"feeling better\".  "

## 2017-03-22 NOTE — CONSULTS
G-Cardiology Consult Note    Referring Provider: Urszula  Reason for Consultation: CHF    Patient Care Team:  Phuong Vera MD as PCP - General    Chief complaint SOB    Subjective .     History of present illness:  This is a 72-year-old female patient who presented to the emergency room with a weeklong history of progressively worsening shortness of breath, peripheral edema, orthopnea, increased weight gain, persistent dry cough and chest congestion.  The patient indicates that she was concerned that she may be developing another bout of pneumonia.  She reported having the sense that she had phlegm in her upper airways that she was not able to mobilize.  She was also having chills but no subjective fever.  He denied having chest discomfort per se.  She has a history of advanced ischemic LV systolic heart failure with a known ejection fraction of 20%, severe mitral regurgitation and severe secondary pulmonary hypertension.  Her estimated right ventricular systolic pressures at last echo were 65 mmHg.  The patient has had a previous myocardial infarction involving the inferior wall and 1995.  She has had balloon angioplasty to the right coronary artery followed by cutting balloon atherotomy\angioplasty to the right coronary artery and then subsequently a drug-eluting stent placed to the right coronary artery in 2007.  She had follow-up catheterization a few years later which showed only minor luminal irregularities with no in-stent restenosis.  Subsequent to that the patient was demonstrated to have a decreased ejection fraction of 20%.  She had a prophylactic dual-chamber ICD placed.  She has had no ICD discharges.  She is followed as an outpatient at the [facility] cardiology office.  The patient appears to have an element of dementia and is a very poor historian.  She cannot recall details of her illness from more than a day ago.  She lives alone and is occasionally looked in upon by her daughter who lives in  [place].  She indicates that she occasionally ambulates with a stroller.  She has had no dizziness palpitations or syncope.  On evaluation here in the emergency room her chest x-ray showed cardiomegaly with mild pulmonary vascular congestion.  Her lung examination however was more consistent with a COPD exacerbation.  She has a history of COPD and prior tobacco abuse but has not smoked in over 25 years.  Her brain natruretic peptide level was greatly elevated at over 8000.  Her troponin was minimally elevated at 0.03 which is consistent with her COPD exacerbation, CHF decompensation, anemia and chronic renal insufficiency with an estimated GFR of 40.  The patient was started on IV loop diuretics and has had a fairly brisk diuresis.  She indicates that her shortness of breath is somewhat improved but she still has significant orthopnea.  She has a coarse, moist cough and feels like she is starting to mobilize some sputum production.  She has remained afebrile.  Her white count is not elevated.    Review of Systems   Review of Systems   Constitution: Positive for chills, weakness and malaise/fatigue. Negative for diaphoresis, fever, night sweats, weight gain and weight loss.   HENT: Negative for ear discharge, hearing loss, hoarse voice and nosebleeds.    Eyes: Negative for discharge, double vision, pain and photophobia.   Cardiovascular: Positive for dyspnea on exertion, leg swelling, orthopnea and palpitations. Negative for chest pain, claudication, cyanosis, irregular heartbeat, near-syncope, paroxysmal nocturnal dyspnea and syncope.   Respiratory: Positive for cough, shortness of breath and wheezing. Negative for hemoptysis and sputum production.    Endocrine: Negative for cold intolerance, heat intolerance, polydipsia, polyphagia and polyuria.   Hematologic/Lymphatic: Negative for adenopathy and bleeding problem. Does not bruise/bleed easily.   Skin: Negative for color change, flushing, itching and rash.    Musculoskeletal: Negative for muscle cramps, muscle weakness, myalgias and stiffness.   Gastrointestinal: Negative for abdominal pain, diarrhea, hematemesis, hematochezia, nausea and vomiting.   Genitourinary: Negative for dysuria, frequency and nocturia.   Neurological: Negative for focal weakness, loss of balance, numbness, paresthesias and seizures.   Psychiatric/Behavioral: Negative for altered mental status, hallucinations and suicidal ideas.   Allergic/Immunologic: Negative for HIV exposure, hives and persistent infections.       History  Past Medical History:   Diagnosis Date   • Anemia    • Anxiety    • Arthritis    • Bacterial sinusitis    • Chronic back pain    • Chronic coronary artery disease 5/12/2016 1996 PTCA of RCA.  2004 PTCA cutting balloon and Express stenting of RCA.  October 2007, Taxus YANDY to proximal and mid-RCA per LCB.  09/10/2008 C, nonobstructive plaque. EF 55%.  December 2014, echocardiogram [facility], EF 35% with RVSP of 56 and aortic sclerosis.     • Chronic obstructive lung disease    • Colon cancer screening    • Colon polyps      · Last Impression: 09 Dec 2015  History of multiple colon polyps including tubularadenomata.  Maximino Fields (Gastroenterology)   • Constipation    • Depression    • Depression    • Diarrhea    • Disease of thyroid gland    • Dysphagia 01/20/2013   • Esophageal reflux    • Esophagitis, reflux    • Hernia    • History of bronchitis    • History of cardiac pacemaker    • History of degenerative disc disease    • History of tobacco abuse    • History of urinary tract infection    • Hyperlipidemia    • Hypothyroidism    • Myocardial infarction    • Nausea    • Occult blood in stools     Impression: 12/09/2015 - This could be multifactorial including secondary to small nasal bleed.  However the small nasal bleed does not explain anemia as such.  The patient previously had undergone a colonoscopy in February 2014.  However unfortunately the  prep was considered to be suboptimal.;    • Pancreatitis     status post ERCP for common duct stone in 2001   • Renal disorder    • RLS (restless legs syndrome)    , Past Surgical History:   Procedure Laterality Date   • BACK SURGERY  1992   • CARDIAC CATHETERIZATION     • CARDIAC PACEMAKER PLACEMENT     • CHOLECYSTECTOMY  1968   • COLONOSCOPY  2009    Fiberoptic (Onset Date: 2009)   • CORONARY STENT PLACEMENT  1994   • HYSTERECTOMY  1998   • INTERNAL CARDIAC DEFIBRILLATOR INSERTION      Dual Lead Cardioverter-Defibrilator   • PACEMAKER IMPLANTATION  1994   • TONSILLECTOMY  1955   , Family History   Problem Relation Age of Onset   • Dementia Mother    • Pneumonia Mother    • COPD Father    • Emphysema Father    • Diabetes Father    • Lung cancer Sister    • Cancer Sister    • COPD Sister    • Hyperlipidemia Sister    • Diabetes Sister    • Hyperlipidemia Brother    • Diabetes Brother    , Social History   Substance Use Topics   • Smoking status: Former Smoker   • Smokeless tobacco: Never Used   • Alcohol use No   , Prescriptions Prior to Admission   Medication Sig Dispense Refill Last Dose   • ALPRAZolam (XANAX) 0.5 MG tablet Take 1 tablet by mouth 3 (Three) Times a Day As Needed (panic attack).   Past Month at Unknown time   • ammonium lactate (LAC-HYDRIN) 12 % lotion Apply  topically 2 (Two) Times a Day As Needed for dry skin. Apply liberally to affected area BID. Refill PRN up to 3 bottles/mo for 12 mo 500 g 24 3/21/2017 at Unknown time   • atorvastatin (LIPITOR) 80 MG tablet Take 1 tablet by mouth Every Night. 30 tablet 0 3/20/2017 at Unknown time   • budesonide-formoterol (SYMBICORT) 160-4.5 MCG/ACT inhaler Inhale 2 puffs 2 (Two) Times a Day. Rinse mouth with water after use. 3 inhaler 3 3/1/2017   • bumetanide (BUMEX) 1 MG tablet Take 1 mg by mouth Daily As Needed (for weight gain of 3 or more lbs or a weekly wt gain of 5 or more lbs).   3/20/2017 at Unknown time   • buPROPion XL (WELLBUTRIN XL) 150 MG 24 hr  tablet Take 1 tablet by mouth Every Morning.   3/21/2017 at Unknown time   • clopidogrel (PLAVIX) 75 MG tablet TAKE ONE TABLET BY MOUTH DAILY 30 tablet 4 3/21/2017 at Unknown time   • escitalopram (LEXAPRO) 10 MG tablet Take 1 tablet by mouth Daily.   3/21/2017 at Unknown time   • hydrocortisone 2.5 % cream Apply  topically 2 (Two) Times a Day for 14 days. Apply to clean dry skin before other lotions. Do not use on face. 20 g 0 3/21/2017 at Unknown time   • hydrOXYzine (VISTARIL) 25 MG capsule Take 25 mg by mouth 3 (Three) Times a Day As Needed for Itching.   Past Month at Unknown time   • insulin aspart (novoLOG) 100 UNIT/ML injection Inject 4-10 Units under the skin 4 (Four) Times a Day Before Meals & at Bedtime. Per sliding scale   3/21/2017 at Unknown time   • insulin glargine (LANTUS) 100 UNIT/ML injection Inject 22 Units under the skin Every Night. 660 Units 3 3/20/2017 at Unknown time   • ipratropium-albuterol (COMBIVENT RESPIMAT)  MCG/ACT inhaler Inhale 1 puff 4 (Four) Times a Day As Needed for shortness of air. 3 g 3 Past Week at Unknown time   • levothyroxine (SYNTHROID, LEVOTHROID) 125 MCG tablet TAKE ONE TABLET BY MOUTH DAILY 30 tablet 4 3/21/2017 at Unknown time   • lidocaine (LIDODERM) 5 % Place 1 patch on the skin every day. (Patient taking differently: Place 1 patch on the skin Daily. Only uses PRN) 30 patch 1 3/20/2017 at Unknown time   • losartan (COZAAR) 50 MG tablet Take 50 mg by mouth 2 (Two) Times a Day.   3/21/2017 at Unknown time   • melatonin 3 MG tablet Take 1 tablet by mouth At Night As Needed for sleep. (Patient taking differently: Take 3 mg by mouth At Night As Needed for sleep. No longer taking)   Past Month at Unknown time   • metoclopramide (REGLAN) 5 MG tablet Take 2.5 mg by mouth 2 (Two) Times a Day.   3/21/2017 at Unknown time   • metoprolol tartrate (LOPRESSOR) 25 MG tablet Take 1 tablet by mouth 2 (Two) Times a Day.   3/21/2017 at Unknown time   • mirtazapine (REMERON) 45  "MG tablet Take 1 tablet by mouth Every Night.   3/20/2017 at Unknown time   • pantoprazole (PROTONIX) 40 MG EC tablet Take 1 tablet by mouth 2 (Two) Times a Day. 60 tablet 5 3/21/2017 at Unknown time   • ranitidine (ZANTAC) 150 MG capsule Take 1 capsule by mouth 2 (Two) Times a Day As Needed for indigestion or heartburn. 60 capsule 11 Past Week at Unknown time   • rOPINIRole (REQUIP) 1 MG tablet Take 1 tablet by mouth Every Night. Take 1 hour before bedtime.   3/20/2017 at Unknown time   • spironolactone (ALDACTONE) 25 MG tablet Take 1 tablet by mouth Every Other Day. Take this medication on the days you DO NOT take lasix (furosimide) 30 tablet 2 3/21/2017 at Unknown time   • tiotropium (SPIRIVA HANDIHALER) 18 MCG per inhalation capsule Place 2 puffs into inhaler and inhale Daily. 90 capsule 3 3/21/2017 at Unknown time   • diphenoxylate-atropine (LOMOTIL) 2.5-0.025 MG per tablet TAKE 2-3 TABS PO PRN DIARRHEA. (Patient taking differently: Pt. No longer taking) 30 tablet 2 Unknown at Unknown time   • furosemide (LASIX) 40 MG tablet Take 0.5 tablets by mouth Every Other Day. If needed 30 tablet 5    • polyethylene glycol (MIRALAX) packet Take 17 g by mouth Daily As Needed (constipation). (Patient taking differently: Take 17 g by mouth Daily As Needed (constipation). No longer taking) 850 g 0 Unknown at Unknown time   • promethazine (PHENERGAN) 12.5 MG tablet Take 2 tablets by mouth Every 6 (Six) Hours As Needed for nausea or vomiting. 20 tablet 0 3/1/2017    and Allergies:  Amphetamine-dextroamphetamine; Lorazepam; and Other    Objective     Vital Sign Min/Max for last 24 hours  Temp  Min: 97.9 °F (36.6 °C)  Max: 98.2 °F (36.8 °C)   BP  Min: 109/57  Max: 129/62   Pulse  Min: 56  Max: 75   Resp  Min: 18  Max: 20   SpO2  Min: 82 %  Max: 97 %   Flow (L/min)  Min: 3  Max: 4   Weight  Min: 199 lb 11.8 oz (90.6 kg)  Max: 200 lb (90.7 kg)     Flowsheet Rows         First Filed Value    Admission Height  65\" (165.1 cm) " Documented at 03/21/2017 1612    Admission Weight  200 lb (90.7 kg) Documented at 03/21/2017 1612               Echo EF Estimated  LVEF: 20%    Physical Exam:   Physical Exam   Constitutional: She is oriented to person, place, and time. She appears well-developed and well-nourished.   HENT:   Head: Normocephalic and atraumatic.   Mouth/Throat: Oropharynx is clear and moist.   Eyes: Conjunctivae and EOM are normal. Pupils are equal, round, and reactive to light. No scleral icterus.   Neck: Normal range of motion. Neck supple. JVD present. No tracheal deviation present. No thyromegaly present.   Cardiovascular: Regular rhythm, S1 normal, S2 normal, intact distal pulses and normal pulses.  Bradycardia present.  PMI is displaced.  Exam reveals gallop, S3 and friction rub.    No murmur heard.  Pulmonary/Chest: Effort normal. No respiratory distress. She has wheezes in the right upper field and the left upper field. She has rhonchi in the right upper field and the left upper field. She has rales in the right lower field and the left lower field.   Abdominal: Soft. Bowel sounds are normal. She exhibits no distension and no mass. There is no tenderness. There is no rebound and no guarding.   Musculoskeletal: Normal range of motion. She exhibits edema. She exhibits no deformity.   Neurological: She is alert and oriented to person, place, and time. She displays normal reflexes. No cranial nerve deficit. Coordination normal.   Skin: Skin is warm and dry. No rash noted. There is erythema.   Psychiatric: She has a normal mood and affect. Her behavior is normal. Thought content normal.       Results Review:   I reviewed the patient's new clinical results.    Results from last 7 days  Lab Units 03/21/17  1717   WBC 10*3/mm3 5.82   HEMOGLOBIN g/dL 10.1*   HEMATOCRIT % 32.7*   PLATELETS 10*3/mm3 244       Results from last 7 days  Lab Units 03/22/17  0527 03/21/17  1717   SODIUM mmol/L 138 135*   POTASSIUM mmol/L 5.1 4.8   CHLORIDE  mmol/L 98 94*   TOTAL CO2 mmol/L 32.0* 33.0*   BUN mg/dL 49* 49*   CREATININE mg/dL 1.20 1.20   GLUCOSE mg/dL 94 112*   CALCIUM mg/dL 8.9 8.7       TROPONINT: 0.03          Assessment/Plan     Active Problems:    Chronic coronary artery disease- stable and angina free.  She has a minimal elevation in her troponin consistent with congestive heart failure decompensation.    Chronic systolic CHF (congestive heart failure), NYHA class 4.  The patient's left ventricular ejection fraction is 20%.  She has severe secondary pulmonary hypertension most likely due to her mitral regurgitation.  Early demonstrates biventricular failure with heart failure decompensation.    Congestive heart failure- superimposed on her congestive heart failure is an element of COPD exacerbation with probable acute on chronic bronchitis.    Elevated LFTs- this could be part of chronic passive congestion of the liver due to biventricular heart failure.    Diabetic peripheral neuropathy associated with type 2 diabetes mellitus    Severe mitral regurgitation-functional mitral regurgitation due to left ventricular dilatation.    Severe secondary pulmonary hypertension.    Early dementia.    Stage III chronic renal insufficiency.    Unexplained mild anemia.  This may be due to anemia of chronic disease.  At the present time there is no evidence of overt clinically obvious bleeding.    At this point I would continue her IV diuresis at once every 24 hours.  I would avoid overdiuresis.  I would also focus on aggressive bronchodilator therapy and pulmonary toilet.  Unfortunately her blood pressure is such that she will not tolerate a great deal of escalation in her standard heart failure medication.  She is not a candidate for cardiac transplantation or LVAD.  I doubt she would be considered for surgical mitral valve repair or percutaneous stephanie-clip.  Her last hospitalization for congestive heart failure was 13 months ago.  She will however benefit from  changing her Lopressor to a highly cardioselective beta blocker that's been shown to be efficacious in the setting of advanced heart failure-bisoprocol.  In addition she would benefit from ranging her losartan to entresto.  I have also recommended limited in sodium restriction.  We will obtain a repeat echocardiogram.      I discussed the patients findings and my recommendations with patient    Jay Jay Cross MD  03/22/17  9:26 AM

## 2017-03-22 NOTE — PROGRESS NOTES
"Adult Nutrition  Assessment/PES    Patient Name:  Yudy Vásquez  YOB: 1945  MRN: [id number]  Admit Date:  3/21/2017    Assessment Date:  3/22/2017        Reason for Assessment       03/22/17 0904    Reason for Assessment    Reason For Assessment/Visit diagnosis/disease state    Identified At Risk By Screening Criteria BMI    Diagnosis Diagnosis    Cardiac CHF;CAD    Endocrine DM Type 2                Anthropometrics       03/22/17 0907    Anthropometrics    Height Method Stated    Height 165.1 cm (65\")    Weight Method Bed scale    Weight 90.6 kg (199 lb 11.8 oz)    Ideal Body Weight (IBW)    Ideal Body Weight (IBW), Female 57.69    % Ideal Body Weight 157.37    Body Mass Index (BMI)    BMI (kg/m2) 33.31    BMI Grade 30 - 34.9- obesity - grade I            Labs/Tests/Procedures/Meds       03/22/17 0908    Labs/Tests/Procedures/Meds    Labs/Tests Review Reviewed   High: BUN, AST, ALT   Low: Hg, Hct    Medication Review Reviewed, pertinent;Insulin;Diuretic              Estimated/Assessed Needs       03/22/17 0908    Calculation Measurements    Weight Used For Calculations 65.8 kg (145 lb)   IBW    Height Used for Calculations 1.651 m (5' 5\")    Estimated/Assessed Energy Needs    Energy Need Method Dougherty-St Jeor    Age 72    RMR (Dougherty-St. Jeor Equation) 1163.6    Activity Factors (Dougherty St Jeor)  Confined to bed  1.2    Estimated Kcal Range  ~9537-9398 kcal    Estimated/Assessed Protein Needs    Weight Used for Protein Calculation 65.8 kg (145 lb)    Protein (gm/kg) 1.5    1.5 Gm Protein (gm) 98.66    Estimated Protein Range ~79-98 gms     Estimated/Assessed Fluid Needs    Fluid Need Method RDA method    RDA Method (mL)  --   7807-1727 ml            Nutrition Prescription Ordered       03/22/17 0913    Nutrition Prescription PO    Current PO Diet Regular    Common Modifiers Cardiac;Consistent Carbohydrate;Renal            Evaluation of Received Nutrient/Fluid Intake       03/22/17 0908    PO " Evaluation    Number of Days PO Intake Evaluated Insufficient Data              Problem/Interventions:        Problem 1       03/22/17 0915    Nutrition Diagnoses Problem 1    Problem 1 Overweight/Obesity    Etiology (related to) Medical Diagnosis    Endocrine DM2    Signs/Symptoms (evidenced by) BMI    BMI 30 - 34.9            Problem 2       03/22/17 0918    Nutrition Diagnoses Problem 2    Problem 2 Swallowing Difficulty    Etiology (related to) Functional Diagnosis    Functional Diagnosis Dysphagia   Per H&P notes    Signs/Symptoms (evidenced by) Reported Information Deficit   Insufficient PO intake data                  Intervention Goal       03/22/17 0920    Intervention Goal    General Disease management/therapy;Meet nutritional needs for age/condition    PO Increase intake;PO intake (%)    PO Intake % 25 %    Weight No significant weight loss            Nutrition Intervention       03/22/17 0920    Nutrition Intervention    RD/Tech Action Encourage intake;Follow Tx progress;Supplement provided            Nutrition Prescription       03/22/17 0920    Nutrition Prescription PO    PO Prescription Begin/change supplement    Supplement Glucerna Shake   ProStat 30mL BID    Supplement Frequency 2 times a day    New PO Prescription Ordered? Yes    Other Orders    Obtain Weight Daily    Obtain Weight Ordered? No, recommended    Supplement Vitamin mineral supplement    Supplement Ordered? Yes            Education/Evaluation       03/22/17 0922    Education    Education Will Instruct as appropriate    Monitor/Evaluation    Monitor Per protocol;PO intake;I&O;Supplement intake;Weight;Skin status        Comments:  Rec. #1: Continue current diet order; encouraging intake. Rec #2: Consider MVI with minerals daily. When talking with pt, pt stated she usually drinks Glucerna at home. Supplements ordered Glucerna BID and ProStat 30 mL BID.  RD to follow pt. Consult RD PRN.    Electronically signed by:  Felicia Champion,  RD  03/22/17 10:07 AM

## 2017-03-22 NOTE — PLAN OF CARE
Problem: Patient Care Overview (Adult)  Goal: Plan of Care Review  Outcome: Ongoing (interventions implemented as appropriate)    03/22/17 0356   Coping/Psychosocial Response Interventions   Plan Of Care Reviewed With patient   Patient Care Overview   Progress unable to show any progress toward functional goals   Outcome Evaluation   Outcome Summary/Follow up Plan Pt brought to floor today at 1930, Pt appears anxious and complaining of difficulty breathing, crackles heard throughout lung fields upon ascultation, O2 sat remains 92-95% on 3.5 L NC, +2 pitting edema identified in bilateral legs, ankle, and feet.          Problem: Fluid Volume Excess (Adult,Obstetrics,Pediatric)  Goal: Identify Related Risk Factors and Signs and Symptoms  Outcome: Ongoing (interventions implemented as appropriate)    03/22/17 0356   Fluid Volume Excess   Fluid Volume Excess: Related Risk Factors disease process;knowledge deficit   Signs and Symptoms (Fluid Volume Excess) anxiety;acute weight gain;edema;fatigue;pulmonary congestion/pleural effusion;restlessness       Goal: Stable Weight  Outcome: Ongoing (interventions implemented as appropriate)    03/22/17 0356   Fluid Volume Excess (Adult,Obstetrics,Pediatric)   Stable Weight making progress toward outcome       Goal: Balanced Intake/Output  Outcome: Ongoing (interventions implemented as appropriate)    03/22/17 0356   Fluid Volume Excess (Adult,Obstetrics,Pediatric)   Balanced Intake/Output making progress toward outcome         Problem: Cardiac: Heart Failure (Adult)  Goal: Signs and Symptoms of Listed Potential Problems Will be Absent or Manageable (Cardiac: Heart Failure)  Outcome: Ongoing (interventions implemented as appropriate)    03/22/17 0356   Cardiac: Heart Failure   Problems Assessed (Heart Failure) cardiac pump dysfunction;fluid/electrolyte imbalance;situational response   Problems Present (Heart Failure) fluid/electrolyte imbalance;cardiac pump dysfunction;situational  response         Problem: Fall Risk (Adult)  Goal: Identify Related Risk Factors and Signs and Symptoms  Outcome: Ongoing (interventions implemented as appropriate)    03/22/17 0356   Fall Risk   Fall Risk: Related Risk Factors age-related changes;confusion/agitation;history of falls   Fall Risk: Signs and Symptoms presence of risk factors       Goal: Absence of Falls  Outcome: Ongoing (interventions implemented as appropriate)    03/22/17 0356   Fall Risk (Adult)   Absence of Falls making progress toward outcome

## 2017-03-22 NOTE — NURSING NOTE
SWATI teaching for Heart Failure  Pt sleeping pt 's daughter able to give teachback on what is it and signs and symptoms

## 2017-03-23 NOTE — NURSING NOTE
SWATI teaching for Heart failure pt able to give teachback regarding what it is, causes, signs and symptoms, medications and sidie effects

## 2017-03-23 NOTE — PROGRESS NOTES
INPATIENT PROGRESS NOTE    Date of Admission: 3/21/2017  Length of Stay: 2  Primary Care Physician: Phuong Vera MD    Subjective   HPI: Ms. Vásquez is a 72 year old female admitted with AE SHF.    Interval History:  03/23/17 - Patient seen and examined.  Her daughter is at bedside and we discussed patient's illness and care.  Patient reports she is feeling better today, feels her edema is improving as her breathing.  Denies fever/chills/n/v/d.    Review Of Systems:  Otherwise complete ROS is negative except as mentioned above    Objective      Temp:  [97.6 °F (36.4 °C)-98 °F (36.7 °C)] 97.7 °F (36.5 °C)  Heart Rate:  [55-61] 58  Resp:  [18-22] 20  BP: ()/(43-61) 99/50    Gen: Alert, appropriate, pleasant and interactive  HEENT: EOMI, ATNC, MMM  Neck: Supple  Heart: S1S2, RRR  Lungs: bibasilar crackles  Abdomen: Soft, NTND, BS+  Extremities: Warm, well-perfused, + pulses  Skin: P/W/D  Neuro: A/O x3, speech clear    Results Review:    I have reviewed the labs, radiology results and diagnostic studies.    Lab Results   Component Value Date    TSH 1.370 03/03/2017         Results from last 7 days  Lab Units 03/23/17  0533 03/22/17  0527 03/21/17  1717   SODIUM mmol/L 138 138 135*   POTASSIUM mmol/L 4.9 5.1 4.8   CHLORIDE mmol/L 96* 98 94*   TOTAL CO2 mmol/L 35.0* 32.0* 33.0*   BUN mg/dL 50* 49* 49*   CREATININE mg/dL 1.10 1.20 1.20   CALCIUM mg/dL 9.0 8.9 8.7   BILIRUBIN mg/dL  --   --  0.8   ALK PHOS U/L  --   --  124   ALT (SGPT) U/L  --   --  98*   AST (SGOT) U/L  --   --  51*   GLUCOSE mg/dL 151* 94 112*         Results from last 7 days  Lab Units 03/21/17  1717   WBC 10*3/mm3 5.82   HEMOGLOBIN g/dL 10.1*   HEMATOCRIT % 32.7*   PLATELETS 10*3/mm3 244         Echo 3/22/17  Interpretation Summary      · The left ventricular cavity is moderately dilated.  · Left ventricular wall thickness is consistent with mild concentric hypertrophy.  · Left ventricular function is severely decreased. Estimated EF =  30%.  · Left ventricular diastolic dysfunction (grade III) consistent with restrictive pattern.  · Right ventricular cavity is mild-to-moderately dilated.  · Left atrial cavity size is mildly dilated.  · Moderate-to-severe mitral valve regurgitation is present  · Moderate to severe tricuspid valve regurgitation is present.  · Calculated right ventricular systolic pressure from tricuspid regurgitation is 47 mmHg.  · Mild pulmonic valve regurgitation is present.         I have reviewed the medications.      Current Facility-Administered Medications:   •  ALPRAZolam (XANAX) tablet 0.5 mg, 0.5 mg, Oral, TID PRN, Laurent Almonte MD, 0.5 mg at 03/23/17 0142  •  ammonium lactate (LAC-HYDRIN) 12 % lotion, , Topical, BID PRN, Laurent Almonte MD  •  atorvastatin (LIPITOR) tablet 20 mg, 20 mg, Oral, Nightly, Laurent Almonte MD, 20 mg at 03/22/17 2035  •  bisoprolol (ZEBeta) tablet 5 mg, 5 mg, Oral, Q24H, Jay Jay Cross MD, 5 mg at 03/22/17 1730  •  budesonide (PULMICORT) nebulizer solution 0.5 mg, 0.5 mg, Nebulization, TID - RT, Zbigniew Seaman MD, 0.5 mg at 03/22/17 2250  •  budesonide-formoterol (SYMBICORT) 160-4.5 MCG/ACT inhaler 2 puff, 2 puff, Inhalation, BID - RT, Laurent Almonte MD, 2 puff at 03/22/17 2253  •  buPROPion XL (WELLBUTRIN XL) 24 hr tablet 150 mg, 150 mg, Oral, QAM, Laurent Almonte MD, 150 mg at 03/23/17 0621  •  CHAPSTICK 1 each, 1 each, Apply externally, 4x Daily PRN, Zbigniew Seaman MD  •  clopidogrel (PLAVIX) tablet 75 mg, 75 mg, Oral, Daily, Laurent Almonte MD, 75 mg at 03/22/17 0921  •  enoxaparin (LOVENOX) syringe 40 mg, 40 mg, Subcutaneous, Q24H, Laurent Almonte MD, 40 mg at 03/22/17 0918  •  furosemide (LASIX) injection 40 mg, 40 mg, Intravenous, Daily, Jay Jay Cross MD  •  hydrOXYzine (VISTARIL) capsule 25 mg, 25 mg, Oral, TID PRN, Zbigniew Seaman MD, 25 mg at 03/23/17 0510  •  insulin detemir (LEVEMIR) injection 40 Units, 40 Units,  Subcutaneous, Nightly, Laurent Almonte MD, 40 Units at 03/22/17 2036  •  ipratropium-albuterol (DUO-NEB) nebulizer solution 3 mL, 3 mL, Nebulization, Q6H PRN, Laurent Almonte MD  •  ipratropium-albuterol (DUO-NEB) nebulizer solution 3 mL, 3 mL, Nebulization, Q8H - RT, Zbigniew Seaman MD, 3 mL at 03/22/17 2250  •  levothyroxine (SYNTHROID, LEVOTHROID) tablet 125 mcg, 125 mcg, Oral, Q AM, Laurent Almonte MD, 125 mcg at 03/23/17 0621  •  lidocaine (LIDODERM) 5 % 1 patch, 1 patch, Transdermal, Q24H, Laurent Almonte MD, 1 patch at 03/22/17 2034  •  melatonin tablet 3 mg, 3 mg, Oral, Nightly PRN, Laurent Almonte MD, 3 mg at 03/22/17 2035  •  mirtazapine (REMERON) tablet 45 mg, 45 mg, Oral, Nightly, Laurent Almonte MD, 45 mg at 03/22/17 2035  •  pantoprazole (PROTONIX) EC tablet 40 mg, 40 mg, Oral, BID, Laurent Almonte MD, 40 mg at 03/22/17 1731  •  polyethylene glycol (MIRALAX) powder 17 g, 17 g, Oral, Daily PRN, Laurent Almonte MD  •  rOPINIRole (REQUIP) tablet 1 mg, 1 mg, Oral, Nightly, Laurent Almonte MD, 1 mg at 03/22/17 2035  •  sacubitril-valsartan (ENTRESTO) 24-26 MG tablet 1 tablet, 1 tablet, Oral, Q12H, Jay Jay Cross MD, 1 tablet at 03/22/17 2035  •  sodium chloride 0.9 % flush 1-10 mL, 1-10 mL, Intravenous, PRN, Laurent Almonte MD, 10 mL at 03/22/17 1731  •  sodium chloride 3 % nebulizer solution 4 mL, 4 mL, Nebulization, Q8H, Zbigniew Seaman MD, 4 mL at 03/22/17 2250  •  spironolactone (ALDACTONE) tablet 25 mg, 25 mg, Oral, Every Other Day, Laurent Almonte MD, 25 mg at 03/21/17 1779    Assessment/Plan     Problem List  Principal Problem:    Chronic systolic CHF (congestive heart failure), NYHA class 3  Active Problems:    Anemia of chronic disease    Chronic coronary artery disease    Chronic hypoxemic respiratory failure    Essential hypertension    Ischemic cardiomyopathy    Hypothyroidism due to acquired atrophy of thyroid    COPD,  severe    Moderate to severe pulmonary hypertension    Chronic kidney disease (CKD) stage G3a/A1, moderately decreased glomerular filtration rate (GFR) between 45-59 mL/min/1.73 square meter and albuminuria creatinine ratio less than 30 mg/g    Elevated LFTs    Diabetic peripheral neuropathy associated with type 2 diabetes mellitus      Assessment/Plan    AE SCHF  - LVEF 30% per latest echo  Chronic Hypoxic Respiratory Failure  COPD  CKD III  DM  CAD  Hypothyroid           Cardiology consulted, patient followed by Dr. Cross.  Will continue diuresis and medical management of heart failure, echo completed. Daily weights, strict i/os.     Continue management of chronic medical problems as indicated.     Monitor renal function.     AM labs.     DVT Prophylaxis - enoxaparin        Zbigniew Seaman MD 03/23/17 7:07 AM

## 2017-03-23 NOTE — PLAN OF CARE
Problem: Patient Care Overview (Adult)  Goal: Plan of Care Review  Outcome: Ongoing (interventions implemented as appropriate)    03/22/17 0356 03/23/17 0334   Coping/Psychosocial Response Interventions   Plan Of Care Reviewed With --  patient   Patient Care Overview   Progress unable to show any progress toward functional goals --    Outcome Evaluation   Outcome Summary/Follow up Plan Pt brought to floor today at 1930, Pt appears anxious and complaining of difficulty breathing, crackles heard throughout lung fields upon ascultation, O2 sat remains 92-95% on 3.5 L NC, +2 pitting edema identified in bilateral legs, ankle, and feet.  --        Goal: Adult Individualization and Mutuality  Outcome: Ongoing (interventions implemented as appropriate)  Goal: Discharge Needs Assessment  Outcome: Ongoing (interventions implemented as appropriate)    Problem: Fluid Volume Excess (Adult,Obstetrics,Pediatric)  Goal: Identify Related Risk Factors and Signs and Symptoms  Outcome: Ongoing (interventions implemented as appropriate)  Goal: Stable Weight  Outcome: Ongoing (interventions implemented as appropriate)  Goal: Balanced Intake/Output  Outcome: Ongoing (interventions implemented as appropriate)    Problem: Cardiac: Heart Failure (Adult)  Goal: Signs and Symptoms of Listed Potential Problems Will be Absent or Manageable (Cardiac: Heart Failure)  Outcome: Ongoing (interventions implemented as appropriate)    Problem: Fall Risk (Adult)  Goal: Identify Related Risk Factors and Signs and Symptoms  Outcome: Ongoing (interventions implemented as appropriate)  Goal: Absence of Falls  Outcome: Ongoing (interventions implemented as appropriate)

## 2017-03-23 NOTE — NURSING NOTE
0600 Pt with decreased lung sounds with increased crackles and expiratory wheezing.  RT called for prn  Neb, pt reports no relief after neb.  Dr Matias notified and new order for stat lasix received.  Will continue to monitor pt.

## 2017-03-23 NOTE — PROGRESS NOTES
[facility]    Name:  Yudy Vásquez   Age:  72 y.o.  Sex:  female  :  1945  MRN:  [id number]   Visit Number:  [id number]  Admission Date:  @ADMITDT  Date Of Service:  17  Primary Care Physician:  Phuong Vear MD    Patient was seen and examined with the nurse. Pertinent laboratory and radiology data were reviewed.  I was called by the nurse about patient having increasing shortness of breath and chest congestion.  She was admitted with acute on chronic systolic heart failure.  Unfortunately, patient has advanced heart failure with ejection fraction of 20% with severe mitral and tricuspid regurgitations as well as severe pulmonary hypertension.  She was seen by Dr. Cross and he has recommended medical therapy.  Her beta blocker has been changed to bisoprolol and she has been started on Entresto.  She is also on Lasix and Aldactone.  Unfortunately, she is not a candidate for any surgical interventions for her valvular abnormalities.  She may not be a candidate for heart transplant or LVAD therapy.    Patient is currently sitting up on the bed and is comfortable except for her anxiety.  She is saturating in the low 90s on 4 L of nasal cannula oxygen.  She is able to speak in full sentences.  She does have diffuse coarse crackles bilaterally without any significant wheezing.  She is not tachycardic.  Her renal function is stable.  She did receive Lasix this morning.    Vital signs:    Vital Signs (last 24 hours)        0700  -   0659  0700  -   1939   Most Recent    Temp (°F) 97.6 -  98    97 -  98.6     97.9 (36.6)    Heart Rate 55 -  61    59 -  64     64    Resp 18 -  22    16 -  20     20    BP 99/50 -  128/61    110/62 -  134/54     134/54    SpO2 (%) 90 -  99    95 -  97     97        Patient has advanced heart failure and ischemic cardiomyopathy.  We will continue to manage her conservatively with diuresis and medications.   I discussed advanced directives at length with the patient.  She states that she does have a living will but does not remember the details.  At present, she wants to be a full code.  She has 4 living children and a daughter who lives in town.  The daughter is in the process of getting power of  papers.  I will give her another dose of Lasix this evening.  If she continues to have shortness of breath he may be able to try BiPAP later but currently she is comfortable on nasal cannula oxygen.  Her short and long-term prognosis is extremely poor.    Juancarlos Matias MD  03/23/17  7:39 PM

## 2017-03-23 NOTE — H&P
INPATIENT PROGRESS NOTE    Date of Admission: 3/21/2017  Length of Stay: 2  Primary Care Physician: Phuong Vera MD    Subjective   HPI: Ms. Vásquez is a 72 year old female with recent admission for IRINA with discharge to rehab.  She returned yesterday with dyspnea and edema and was diagnosed with systolic CHF exacerbation and admitted.     Interval History:  03/23/17 - Patient seen and examined in follow up of overnight admission as above.  She is diuresing well and reports improvement in her breathing and edema.  She has been seen by Dr. Cross     Review Of Systems:  Otherwise complete ROS is negative except as mentioned above    Objective      Temp:  [97.6 °F (36.4 °C)-98 °F (36.7 °C)] 97.6 °F (36.4 °C)  Heart Rate:  [55-75] 55  Resp:  [18-22] 20  BP: (107-128)/(43-61) 107/57    Gen: Alert, appropriate, pleasant and interactive  HEENT: EOMI, ATNC, MMM  Neck: Supple  Heart: S1S2, RRR  Lungs: bibasilar crackles  Abdomen: Soft, NTND, BS+  Extremities: Warm, well-perfused, + pulses  Skin: P/W/D  Neuro: A/O x3, speech clear    Results Review:    I have reviewed the labs, radiology results and diagnostic studies.      Results from last 7 days  Lab Units 03/22/17  0527 03/21/17  1717   SODIUM mmol/L 138 135*   POTASSIUM mmol/L 5.1 4.8   CHLORIDE mmol/L 98 94*   TOTAL CO2 mmol/L 32.0* 33.0*   BUN mg/dL 49* 49*   CREATININE mg/dL 1.20 1.20   CALCIUM mg/dL 8.9 8.7   BILIRUBIN mg/dL  --  0.8   ALK PHOS U/L  --  124   ALT (SGPT) U/L  --  98*   AST (SGOT) U/L  --  51*   GLUCOSE mg/dL 94 112*         Results from last 7 days  Lab Units 03/21/17  1717   WBC 10*3/mm3 5.82   HEMOGLOBIN g/dL 10.1*   HEMATOCRIT % 32.7*   PLATELETS 10*3/mm3 244       I have reviewed the medications.      Current Facility-Administered Medications:   •  ALPRAZolam (XANAX) tablet 0.5 mg, 0.5 mg, Oral, TID PRN, Laurent Almonte MD, 0.5 mg at 03/22/17 1732  •  ammonium lactate (LAC-HYDRIN) 12 % lotion, , Topical, BID PRN, Laurent Almonte,  MD  •  atorvastatin (LIPITOR) tablet 20 mg, 20 mg, Oral, Nightly, Laurent Almonte MD, 20 mg at 03/22/17 2035  •  bisoprolol (ZEBeta) tablet 5 mg, 5 mg, Oral, Q24H, Jay Jay Cross MD, 5 mg at 03/22/17 1730  •  budesonide (PULMICORT) nebulizer solution 0.5 mg, 0.5 mg, Nebulization, TID - RT, Zbigniew Seaman MD, 0.5 mg at 03/22/17 2250  •  budesonide-formoterol (SYMBICORT) 160-4.5 MCG/ACT inhaler 2 puff, 2 puff, Inhalation, BID - RT, Laurent Almonte MD, 2 puff at 03/22/17 2253  •  buPROPion XL (WELLBUTRIN XL) 24 hr tablet 150 mg, 150 mg, Oral, QAM, Laurent Almonte MD, 150 mg at 03/22/17 0602  •  CHAPSTICK 1 each, 1 each, Apply externally, 4x Daily PRN, Zbigniew Seaman MD  •  clopidogrel (PLAVIX) tablet 75 mg, 75 mg, Oral, Daily, Laurent Almonte MD, 75 mg at 03/22/17 0921  •  enoxaparin (LOVENOX) syringe 40 mg, 40 mg, Subcutaneous, Q24H, Laurent Almonte MD, 40 mg at 03/22/17 0918  •  furosemide (LASIX) injection 40 mg, 40 mg, Intravenous, Daily, Jay Jay Cross MD  •  hydrOXYzine (VISTARIL) capsule 25 mg, 25 mg, Oral, TID PRN, Zbigniew Seaman MD, 25 mg at 03/22/17 2035  •  insulin detemir (LEVEMIR) injection 40 Units, 40 Units, Subcutaneous, Nightly, Laurent Almonte MD, 40 Units at 03/22/17 2036  •  ipratropium-albuterol (DUO-NEB) nebulizer solution 3 mL, 3 mL, Nebulization, Q6H PRN, Laurent Almonte MD  •  ipratropium-albuterol (DUO-NEB) nebulizer solution 3 mL, 3 mL, Nebulization, Q8H - RT, Zbigniew Seaman MD, 3 mL at 03/22/17 2250  •  levothyroxine (SYNTHROID, LEVOTHROID) tablet 125 mcg, 125 mcg, Oral, Q AM, Laurent Almonte MD, 125 mcg at 03/22/17 0602  •  lidocaine (LIDODERM) 5 % 1 patch, 1 patch, Transdermal, Q24H, Laurent Almonte MD, 1 patch at 03/22/17 2034  •  melatonin tablet 3 mg, 3 mg, Oral, Nightly PRN, Laurent Almonte MD, 3 mg at 03/22/17 2035  •  mirtazapine (REMERON) tablet 45 mg, 45 mg, Oral, Nightly, Laurent Almonte,  MD, 45 mg at 03/22/17 2035  •  pantoprazole (PROTONIX) EC tablet 40 mg, 40 mg, Oral, BID, Laurent Almonte MD, 40 mg at 03/22/17 1731  •  polyethylene glycol (MIRALAX) powder 17 g, 17 g, Oral, Daily PRN, Laurent Alomnte MD  •  rOPINIRole (REQUIP) tablet 1 mg, 1 mg, Oral, Nightly, Laurent Almonte MD, 1 mg at 03/22/17 2035  •  sacubitril-valsartan (ENTRESTO) 24-26 MG tablet 1 tablet, 1 tablet, Oral, Q12H, Jay Jay Cross MD, 1 tablet at 03/22/17 2035  •  sodium chloride 0.9 % flush 1-10 mL, 1-10 mL, Intravenous, PRN, Laurent Almonte MD, 10 mL at 03/22/17 1731  •  sodium chloride 3 % nebulizer solution 4 mL, 4 mL, Nebulization, Q8H, Zbigniew Seaman MD, 4 mL at 03/22/17 2250  •  spironolactone (ALDACTONE) tablet 25 mg, 25 mg, Oral, Every Other Day, Laurent Almonte MD, 25 mg at 03/21/17 2157    Assessment/Plan     Problem List  Principal Problem:    Chronic systolic CHF (congestive heart failure), NYHA class 3  Active Problems:    Anemia of chronic disease    Chronic coronary artery disease    Chronic hypoxemic respiratory failure    Essential hypertension    Ischemic cardiomyopathy    Hypothyroidism due to acquired atrophy of thyroid    COPD, severe    Moderate to severe pulmonary hypertension    Chronic kidney disease (CKD) stage G3a/A1, moderately decreased glomerular filtration rate (GFR) between 45-59 mL/min/1.73 square meter and albuminuria creatinine ratio less than 30 mg/g    Elevated LFTs    Diabetic peripheral neuropathy associated with type 2 diabetes mellitus      Assessment/Plan    AE SCHF  Chronic Hypoxic Respiratory Failure  COPD  CKD III  DM  CAD  Hypothyroid        Cardiology consulted, patient followed by Dr. Cross.  Will continue diuresis and medical management of heart failure, echo pending.  Daily weights, strict i/os.    Continue management of chronic medical problems as indicated.    Monitor renal function.    AM labs.      Zbigniew Seaman MD 03/23/17  12:37 AM

## 2017-03-24 NOTE — PROGRESS NOTES
INPATIENT PROGRESS NOTE    Date of Admission: 3/21/2017  Length of Stay: 3  Primary Care Physician: Phuong Vera MD    Subjective   HPI: Ms. Vásquez is a 72 year old female admitted with AE SHF.    Interval History:  03/24/17 - Patient seen and examined. Daughter at bedside. Patient reports nose bleed today (clearly anterior), stopped spontaneously.  Denies N/V/D, fever/chills. Denies worsening dypsnea.    Review Of Systems:  Otherwise complete ROS is negative except as mentioned above    Objective      Temp:  [91.5 °F (33.1 °C)-98 °F (36.7 °C)] 97.9 °F (36.6 °C)  Heart Rate:  [67-73] 73  Resp:  [18-24] 24  BP: (102-144)/(47-63) 144/63    Gen: Alert, appropriate, pleasant and interactive  HEENT: EOMI, ATNC, MMM  Neck: Supple  Heart: S1S2, RRR  Lungs: bibasilar crackles  Abdomen: Soft, NTND, BS+  Extremities: Warm, well-perfused, + pulses  Skin: P/W/D  Neuro: A/O x3, speech clear    Results Review:    I have reviewed the labs, radiology results and diagnostic studies.    Lab Results   Component Value Date    TSH 1.370 03/03/2017         Results from last 7 days  Lab Units 03/24/17  0601 03/23/17  0533 03/22/17  0527 03/21/17  1717   SODIUM mmol/L 143 138 138 135*   POTASSIUM mmol/L 4.4 4.9 5.1 4.8   CHLORIDE mmol/L 97* 96* 98 94*   TOTAL CO2 mmol/L 40.0* 35.0* 32.0* 33.0*   BUN mg/dL 35* 50* 49* 49*   CREATININE mg/dL 0.80 1.10 1.20 1.20   CALCIUM mg/dL 8.8 9.0 8.9 8.7   BILIRUBIN mg/dL  --   --   --  0.8   ALK PHOS U/L  --   --   --  124   ALT (SGPT) U/L  --   --   --  98*   AST (SGOT) U/L  --   --   --  51*   GLUCOSE mg/dL 212* 151* 94 112*         Results from last 7 days  Lab Units 03/21/17  1717   WBC 10*3/mm3 5.82   HEMOGLOBIN g/dL 10.1*   HEMATOCRIT % 32.7*   PLATELETS 10*3/mm3 244         Echo 3/22/17  Interpretation Summary      · The left ventricular cavity is moderately dilated.  · Left ventricular wall thickness is consistent with mild concentric hypertrophy.  · Left ventricular function is severely  decreased. Estimated EF = 30%.  · Left ventricular diastolic dysfunction (grade III) consistent with restrictive pattern.  · Right ventricular cavity is mild-to-moderately dilated.  · Left atrial cavity size is mildly dilated.  · Moderate-to-severe mitral valve regurgitation is present  · Moderate to severe tricuspid valve regurgitation is present.  · Calculated right ventricular systolic pressure from tricuspid regurgitation is 47 mmHg.  · Mild pulmonic valve regurgitation is present.         I have reviewed the medications.      Current Facility-Administered Medications:   •  ALPRAZolam (XANAX) tablet 0.5 mg, 0.5 mg, Oral, TID PRN, Laurent Almonte MD, 0.5 mg at 03/24/17 1651  •  ammonium lactate (LAC-HYDRIN) 12 % lotion, , Topical, BID PRN, Laurent Almonte MD  •  atorvastatin (LIPITOR) tablet 20 mg, 20 mg, Oral, Nightly, Laurent Almonte MD, 20 mg at 03/23/17 2019  •  [START ON 3/25/2017] bisoprolol (ZEBeta) tablet 10 mg, 10 mg, Oral, Q24H, Jay Jay Cross MD  •  budesonide (PULMICORT) nebulizer solution 0.5 mg, 0.5 mg, Nebulization, TID - RT, Zbigniew Seaman MD, 0.5 mg at 03/24/17 0817  •  budesonide-formoterol (SYMBICORT) 160-4.5 MCG/ACT inhaler 2 puff, 2 puff, Inhalation, BID - RT, Laurent Almonte MD, 2 puff at 03/23/17 2316  •  buPROPion XL (WELLBUTRIN XL) 24 hr tablet 150 mg, 150 mg, Oral, QAM, Laurent Almonte MD, 150 mg at 03/24/17 0626  •  CHAPSTICK 1 each, 1 each, Apply externally, 4x Daily PRN, Zbigniew Seaman MD  •  [START ON 3/25/2017] furosemide (LASIX) injection 40 mg, 40 mg, Intravenous, BID, Jay Jay Cross MD  •  hydrOXYzine (VISTARIL) capsule 25 mg, 25 mg, Oral, TID PRN, Zbigniew Seaman MD, 25 mg at 03/24/17 1651  •  insulin detemir (LEVEMIR) injection 40 Units, 40 Units, Subcutaneous, Nightly, Laurent Almonte MD, 40 Units at 03/23/17 2019  •  ipratropium-albuterol (DUO-NEB) nebulizer solution 3 mL, 3 mL, Nebulization, Q6H PRN, Laurent Guallpa  MD Gage, 3 mL at 03/23/17 1856  •  ipratropium-albuterol (DUO-NEB) nebulizer solution 3 mL, 3 mL, Nebulization, Q8H - RT, Zbigniew Seaman MD, 3 mL at 03/24/17 1613  •  levothyroxine (SYNTHROID, LEVOTHROID) tablet 125 mcg, 125 mcg, Oral, Q AM, Laurent Almonte MD, 125 mcg at 03/24/17 0626  •  lidocaine (LIDODERM) 5 % 1 patch, 1 patch, Transdermal, Q24H, Laurent Almonte MD, 1 patch at 03/23/17 2018  •  melatonin tablet 3 mg, 3 mg, Oral, Nightly PRN, Laurent Almonte MD, 3 mg at 03/22/17 2035  •  mirtazapine (REMERON) tablet 45 mg, 45 mg, Oral, Nightly, Laurent Almonte MD, 45 mg at 03/23/17 2018  •  pantoprazole (PROTONIX) EC tablet 40 mg, 40 mg, Oral, BID, Laurent Almonte MD, 40 mg at 03/24/17 1657  •  phenylephrine (RAISA-SYNEPHRINE) 0.5 % nasal spray 2 spray, 2 spray, Each Nare, Q6H, Zbigniew Seaman MD, 2 spray at 03/24/17 1652  •  polyethylene glycol (MIRALAX) powder 17 g, 17 g, Oral, Daily PRN, Laurent Almonte MD  •  rOPINIRole (REQUIP) tablet 1 mg, 1 mg, Oral, Nightly, Laurent Almonte MD, 1 mg at 03/23/17 2019  •  sacubitril-valsartan (ENTRESTO) 24-26 MG tablet 1 tablet, 1 tablet, Oral, Q12H, Jay Jay Cross MD, 1 tablet at 03/24/17 0858  •  sodium chloride (OCEAN) nasal spray 2 spray, 2 spray, Each Nare, PRN, Zbigniew Seaman MD, 2 spray at 03/24/17 1652  •  sodium chloride 0.9 % flush 1-10 mL, 1-10 mL, Intravenous, PRN, Laurent Almonte MD, 10 mL at 03/24/17 0858  •  sodium chloride 3 % nebulizer solution 4 mL, 4 mL, Nebulization, Q8H, Zbigniew Seaman MD, 4 mL at 03/23/17 8984  •  spironolactone (ALDACTONE) tablet 25 mg, 25 mg, Oral, Every Other Day, Laurent Almonte MD, 25 mg at 03/23/17 0900    Assessment/Plan     Problem List  Principal Problem:    Chronic systolic CHF (congestive heart failure), NYHA class 3  Active Problems:    Anemia of chronic disease    Chronic coronary artery disease    Chronic hypoxemic respiratory failure     Essential hypertension    Ischemic cardiomyopathy    Hypothyroidism due to acquired atrophy of thyroid    COPD, severe    Moderate to severe pulmonary hypertension    Chronic kidney disease (CKD) stage G3a/A1, moderately decreased glomerular filtration rate (GFR) between 45-59 mL/min/1.73 square meter and albuminuria creatinine ratio less than 30 mg/g    Elevated LFTs    Diabetic peripheral neuropathy associated with type 2 diabetes mellitus      Assessment/Plan    AE SCHF  - LVEF 30% per latest echo  Chronic Hypoxic Respiratory Failure  COPD  CKD III  DM  CAD  Hypothyroid  Anemia  Epistaxis          Cardiology consulted, patient followed by Dr. Cross.  Will continue diuresis and medical management of heart failure, echo completed. Daily weights, strict i/os.     Alf synephrine given for nose bleed, enoxaparin and plavix discontinued.    Continue management of chronic medical problems as indicated.     Renal function returned to baseline.    Check hemoglobin in AM.     AM labs.     DVT Prophylaxis - mary alice hose since enoxaparin discontinued        Zbigniew Seaman MD 03/24/17 7:27 PM

## 2017-03-24 NOTE — PLAN OF CARE
Problem: Patient Care Overview (Adult)  Goal: Plan of Care Review  Outcome: Ongoing (interventions implemented as appropriate)    Problem: Fluid Volume Excess (Adult,Obstetrics,Pediatric)  Goal: Identify Related Risk Factors and Signs and Symptoms  Outcome: Ongoing (interventions implemented as appropriate)    Problem: Cardiac: Heart Failure (Adult)  Goal: Signs and Symptoms of Listed Potential Problems Will be Absent or Manageable (Cardiac: Heart Failure)  Outcome: Ongoing (interventions implemented as appropriate)    Problem: Fall Risk (Adult)  Goal: Identify Related Risk Factors and Signs and Symptoms  Outcome: Ongoing (interventions implemented as appropriate)

## 2017-03-24 NOTE — PROGRESS NOTES
Continued Stay Note  CLAUDIA Fofana     Patient Name: Yudy Vásquez  MRN: [id number]  Today's Date: 3/24/2017    Admit Date: 3/21/2017          Discharge Plan       03/24/17 0849    Case Management/Social Work Plan    Plan [facility]     Additional Comments Spoke to Francisca at [place] [facility] when pt is ready to transfer back to [facility] will need ot send updated MD  notes and current medcation  list  Pt is agreement to transfer               Discharge Codes     None            Lavonne Hansen

## 2017-03-24 NOTE — PROGRESS NOTES
Continued Stay Note   Brigido     Patient Name: Yudy Vásquez  MRN: [id number]  Today's Date: 3/24/2017    Admit Date: 3/21/2017          Discharge Plan       03/24/17 1728    Final Note    Final Note Received call from Lizbeth on third floor that pt's daughter was here and had questions for CM.  Pts daughter states that her mom was just delivered a form and wanted it explained.  The form was a [employer] form.  Explained that is was pt's [facility] rights form.  She states pt has declined and wanted to ensure that she was not being discharged, if so she wanted to appeal it.  Dr. Seaman was in the [place], so CM spoke with him and pt's daughter concerning discharge.  Per Dr. Seaman, pt was NOT being discharged this date.  Pt's daughter verbalizes that all her concerns and questions had been answered.  She is satisfied and in agreement with plan.  CM informed that they would continue to follow and assist with d/c as needed.  Called Mitra at [place] and informed that pt would not be discharged this date.              Discharge Codes     None        Expected Discharge Date and Time     Expected Discharge Date Expected Discharge Time    Mar 25, 2017             Lavonne Adam

## 2017-03-24 NOTE — PROGRESS NOTES
"[facility]-Cardiology Progress note     LOS: 3 days   Patient Care Team:  Phuong Vera MD as PCP - General    Chief Complaint:  SOB    Subjective     Interval History: The patient was having some respiratory distress earlier this morning with worsening shortness of breath.  This evening she is relatively asymptomatic and is breathing comfortably with no tachycardia or tachypnea.  Her oxygen saturations are normal with nasal cannula oxygen.  Her chest x-ray still demonstrates features consistent with cardiogenic pulmonary edema.  Her lung exam is consistent with pulmonary venous congestion as well as ongoing bronchospasm.    Patient Complaints: none  Patient Denies:  Chest pain, abdominal pain, cough, sputum production.  History taken from: patient    Review of Systems:   All systems were reviewed and negative       Objective     Vital Sign Min/Max for last 24 hours  Temp  Min: 91.5 °F (33.1 °C)  Max: 98 °F (36.7 °C)   BP  Min: 102/60  Max: 144/63   Pulse  Min: 67  Max: 73   Resp  Min: 18  Max: 24   SpO2  Min: 92 %  Max: 100 %   Flow (L/min)  Min: 4  Max: 4   Weight  Min: 198 lb 6.4 oz (90 kg)  Max: 198 lb 6.4 oz (90 kg)     Flowsheet Rows         First Filed Value    Admission Height  65\" (165.1 cm) Documented at 03/21/2017 1612    Admission Weight  200 lb (90.7 kg) Documented at 03/21/2017 1612          Physical Exam:     General Appearance:    Alert, cooperative, in no acute distress   Head:    Normocephalic, without obvious abnormality, atraumatic   Eyes:            Lids and lashes normal, conjunctivae and sclerae normal, no   icterus, no pallor, corneas clear, PERRLA   Ears:    Ears appear intact with no abnormalities noted   Throat:   No oral lesions, no thrush, oral mucosa moist   Neck:   No adenopathy, supple, trachea midline, no thyromegaly, no     carotid bruit, no JVD   Back:     No kyphosis present, no scoliosis present, no skin lesions,       erythema or scars, no tenderness to percussion or                  "  palpation,   range of motion normal   Lungs:     end expiratory wheezes are present bilaterally.  Moist rales are present in the bases bilaterally.,respirations regular, even and  unlabored    Heart:    Regular rhythm and normal rate, normal S1 and S2, G II/VI HSM at the apex (+) S3, no rub, no click   Breast Exam:    Deferred   Abdomen:     Normal bowel sounds, no masses, no organomegaly, soft        non-tender, non-distended, no guarding, no rebound                 tenderness   Genitalia:    Deferred   Extremities:   Moves all extremities well, 2+ Pitting PTE Bilaterally, no cyanosis, no redness   Pulses:   Pulses palpable and equal bilaterally   Skin:   No bleeding, bruising or rash   Lymph nodes:   No palpable adenopathy   Neurologic:   Cranial nerves 2 - 12 grossly intact, sensation intact, DTR        present and equal bilaterally        Results Review:     I reviewed the patient's new clinical results.    Results from last 7 days  Lab Units 03/22/17  0527   HEMOGLOBIN A1C % 7.5*       Results from last 7 days  Lab Units 03/24/17  0601   SODIUM mmol/L 143   POTASSIUM mmol/L 4.4   CHLORIDE mmol/L 97*   TOTAL CO2 mmol/L 40.0*   BUN mg/dL 35*   CREATININE mg/dL 0.80   GLUCOSE mg/dL 212*   CALCIUM mg/dL 8.8       Results from last 7 days  Lab Units 03/21/17  1717   WBC 10*3/mm3 5.82   HEMOGLOBIN g/dL 10.1*   HEMATOCRIT % 32.7*   PLATELETS 10*3/mm3 244       Echo EF Estimated  Lab Results   Component Value Date    ECHOEFEST 30 03/22/2017         Physical Exam    Medication Review: yes    Assessment/Plan     Principal Problem:    Chronic systolic CHF (congestive heart failure), NYHA class 3-4. LVEF 30%  Active Problems:    Anemia of chronic disease    Chronic coronary artery disease- No evidence of active ischemia    Chronic hypoxemic respiratory failure    Essential hypertension    Ischemic cardiomyopathy    Hypothyroidism due to acquired atrophy of thyroid    COPD, severe    Moderate to severe pulmonary  hypertension    Chronic kidney disease (CKD) stage G3a/A1, moderately decreased glomerular filtration rate (GFR) between 45-59 mL/min/1.73 square meter and albuminuria creatinine ratio less than 30 mg/g    Elevated LFTs    Diabetic peripheral neuropathy associated with type 2 diabetes mellitus    I have recommended increasing her the Bisoperolol to 10 mg per day.  In 48 hours this can be uptitrated to 20 mg per day if her blood pressure tolerates it.  I've also recommended giving her an additional dose of IV Lasix.          Jay Jay Cross MD  03/24/17  7:11 PM

## 2017-03-25 NOTE — PLAN OF CARE
Problem: Patient Care Overview (Adult)  Goal: Plan of Care Review  Outcome: Ongoing (interventions implemented as appropriate)  Goal: Adult Individualization and Mutuality  Outcome: Ongoing (interventions implemented as appropriate)  Goal: Discharge Needs Assessment  Outcome: Ongoing (interventions implemented as appropriate)    Problem: Fluid Volume Excess (Adult,Obstetrics,Pediatric)  Goal: Identify Related Risk Factors and Signs and Symptoms  Outcome: Ongoing (interventions implemented as appropriate)    Problem: Fall Risk (Adult)  Goal: Identify Related Risk Factors and Signs and Symptoms  Outcome: Ongoing (interventions implemented as appropriate)  Goal: Absence of Falls  Outcome: Ongoing (interventions implemented as appropriate)  No falls this shift

## 2017-03-25 NOTE — PROGRESS NOTES
INPATIENT PROGRESS NOTE    Date of Admission: 3/21/2017  Length of Stay: 4  Primary Care Physician: Phuong Vera MD    Subjective   HPI: Ms. Vásquez is a 72 year old female admitted with AE SHF.    Interval History:  03/25/17 - Patient seen and examined. Patient is not feeling any better overall but is breathing a little better.  She expresses concern relating to which facility she will go to from here.  CM visited with patient today to discuss.   She has had no further nosebleeds and her oxygen requirement has decreased.  She has had no fevers/chills/night sweats or chest pain.  She did have difficulty sleeping last night but cannot tell me why.    Review Of Systems:  Otherwise complete ROS is negative except as mentioned above    Objective      Temp:  [98 °F (36.7 °C)-98.4 °F (36.9 °C)] 98.1 °F (36.7 °C)  Heart Rate:  [66-90] 69  Resp:  [16-22] 20  BP: (122-143)/(47-66) 137/62    Gen: Alert, appropriate, pleasant and interactive  HEENT: EOMI, ATNC, MMM  Neck: Supple  Heart: S1S2, RRR  Lungs: bibasilar crackles  Abdomen: Soft, NTND, BS+  Extremities: Warm, well-perfused, + pulses  Skin: P/W/D  Neuro: A/O x3, speech clear    Results Review:    I have reviewed the labs, radiology results and diagnostic studies.    Lab Results   Component Value Date    TSH 1.370 03/03/2017         Results from last 7 days  Lab Units 03/24/17  0601 03/23/17  0533 03/22/17  0527 03/21/17  1717   SODIUM mmol/L 143 138 138 135*   POTASSIUM mmol/L 4.4 4.9 5.1 4.8   CHLORIDE mmol/L 97* 96* 98 94*   TOTAL CO2 mmol/L 40.0* 35.0* 32.0* 33.0*   BUN mg/dL 35* 50* 49* 49*   CREATININE mg/dL 0.80 1.10 1.20 1.20   CALCIUM mg/dL 8.8 9.0 8.9 8.7   BILIRUBIN mg/dL  --   --   --  0.8   ALK PHOS U/L  --   --   --  124   ALT (SGPT) U/L  --   --   --  98*   AST (SGOT) U/L  --   --   --  51*   GLUCOSE mg/dL 212* 151* 94 112*         Results from last 7 days  Lab Units 03/25/17  0550 03/21/17  1717   WBC 10*3/mm3 6.09 5.82   HEMOGLOBIN g/dL 9.0* 10.1*    HEMATOCRIT % 29.7* 32.7*   PLATELETS 10*3/mm3 224 244         Echo 3/22/17  Interpretation Summary      · The left ventricular cavity is moderately dilated.  · Left ventricular wall thickness is consistent with mild concentric hypertrophy.  · Left ventricular function is severely decreased. Estimated EF = 30%.  · Left ventricular diastolic dysfunction (grade III) consistent with restrictive pattern.  · Right ventricular cavity is mild-to-moderately dilated.  · Left atrial cavity size is mildly dilated.  · Moderate-to-severe mitral valve regurgitation is present  · Moderate to severe tricuspid valve regurgitation is present.  · Calculated right ventricular systolic pressure from tricuspid regurgitation is 47 mmHg.  · Mild pulmonic valve regurgitation is present.         I have reviewed the medications.      Current Facility-Administered Medications:   •  ALPRAZolam (XANAX) tablet 0.5 mg, 0.5 mg, Oral, TID PRN, Laurent Almonte MD, 0.5 mg at 03/25/17 1228  •  ammonium lactate (LAC-HYDRIN) 12 % lotion, , Topical, BID PRN, Laurent Almonte MD  •  atorvastatin (LIPITOR) tablet 20 mg, 20 mg, Oral, Nightly, Laurent Almonte MD, 20 mg at 03/24/17 2046  •  bisoprolol (ZEBeta) tablet 10 mg, 10 mg, Oral, Q24H, Jay Jay Cross MD, 10 mg at 03/25/17 0823  •  budesonide (PULMICORT) nebulizer solution 0.5 mg, 0.5 mg, Nebulization, TID - RT, Zbigniew Seaman MD, 0.5 mg at 03/25/17 0737  •  budesonide-formoterol (SYMBICORT) 160-4.5 MCG/ACT inhaler 2 puff, 2 puff, Inhalation, BID - RT, Laurent Almonte MD, 2 puff at 03/23/17 2316  •  buPROPion XL (WELLBUTRIN XL) 24 hr tablet 150 mg, 150 mg, Oral, QAM, Lauernt Almonte MD, 150 mg at 03/25/17 0605  •  CHAPSTICK 1 each, 1 each, Apply externally, 4x Daily PRN, Zbigniew Seaman MD  •  furosemide (LASIX) injection 40 mg, 40 mg, Intravenous, BID, Jay Jay Cross MD, 40 mg at 03/25/17 8440  •  hydrOXYzine (VISTARIL) capsule 25 mg, 25 mg, Oral, TID PRN,  Zbigniew Seaman MD, 25 mg at 03/25/17 1228  •  insulin detemir (LEVEMIR) injection 40 Units, 40 Units, Subcutaneous, Nightly, Laurent Almonte MD, 40 Units at 03/24/17 2050  •  ipratropium-albuterol (DUO-NEB) nebulizer solution 3 mL, 3 mL, Nebulization, Q6H PRN, Laurent Almonte MD, 3 mL at 03/23/17 1856  •  ipratropium-albuterol (DUO-NEB) nebulizer solution 3 mL, 3 mL, Nebulization, Q8H - RT, Zbigniew Seaman MD, 3 mL at 03/25/17 1300  •  levothyroxine (SYNTHROID, LEVOTHROID) tablet 125 mcg, 125 mcg, Oral, Q AM, Laurent Almonte MD, 125 mcg at 03/25/17 0605  •  lidocaine (LIDODERM) 5 % 1 patch, 1 patch, Transdermal, Q24H, Laurent Almonte MD, 1 patch at 03/24/17 2046  •  melatonin tablet 3 mg, 3 mg, Oral, Nightly PRN, Laurent Almonte MD, 3 mg at 03/24/17 2046  •  mirtazapine (REMERON) tablet 45 mg, 45 mg, Oral, Nightly, Laurent Almonte MD, 45 mg at 03/24/17 2046  •  pantoprazole (PROTONIX) EC tablet 40 mg, 40 mg, Oral, BID, Laurent Almonte MD, 40 mg at 03/25/17 1803  •  phenylephrine (RAISA-SYNEPHRINE) 0.5 % nasal spray 2 spray, 2 spray, Each Nare, Q6H, Zbigniew Seaman MD, 2 spray at 03/25/17 1653  •  polyethylene glycol (MIRALAX) powder 17 g, 17 g, Oral, Daily PRN, Laurent Almonte MD  •  rOPINIRole (REQUIP) tablet 1 mg, 1 mg, Oral, Nightly, Laurent Almonte MD, 1 mg at 03/24/17 2046  •  sacubitril-valsartan (ENTRESTO) 24-26 MG tablet 1 tablet, 1 tablet, Oral, Q12H, Jay Jay Cross MD, 1 tablet at 03/25/17 0824  •  sodium chloride (OCEAN) nasal spray 2 spray, 2 spray, Each Nare, PRN, Zbigniew Seaman MD, 2 spray at 03/24/17 1652  •  sodium chloride 0.9 % flush 1-10 mL, 1-10 mL, Intravenous, PRN, Laurent Almonte MD, 10 mL at 03/24/17 0858  •  sodium chloride 3 % nebulizer solution 4 mL, 4 mL, Nebulization, Q8H, Zbigniew Seaman MD, 4 mL at 03/24/17 2037  •  spironolactone (ALDACTONE) tablet 25 mg, 25 mg, Oral, Every Other Day, Laurent Guallpa  MD Gage, 25 mg at 03/25/17 0824    Assessment/Plan     Problem List  Principal Problem:    Chronic systolic CHF (congestive heart failure), NYHA class 3  Active Problems:    Anemia of chronic disease    Chronic coronary artery disease    Chronic hypoxemic respiratory failure    Essential hypertension    Ischemic cardiomyopathy    Hypothyroidism due to acquired atrophy of thyroid    COPD, severe    Moderate to severe pulmonary hypertension    Chronic kidney disease (CKD) stage G3a/A1, moderately decreased glomerular filtration rate (GFR) between 45-59 mL/min/1.73 square meter and albuminuria creatinine ratio less than 30 mg/g    Elevated LFTs    Diabetic peripheral neuropathy associated with type 2 diabetes mellitus      Assessment/Plan    AE SCHF  - LVEF 30% per latest echo  Chronic Hypoxic Respiratory Failure  COPD  CKD III  DM  CAD  Hypothyroid  Anemia  Epistaxis          Cardiology consulted, patient followed by Dr. Cross.  Will continue diuresis and medical management of heart failure, echo completed. Daily weights, strict i/os.     Continue to hold enoxaparin and plavix.    Continue management of chronic medical problems as indicated.     Renal function returned to baseline.    Check hemoglobin in AM.     AM labs.     DVT Prophylaxis - mary alice hose    Placement pending.      Zbigniew Seaman MD 03/25/17 6:41 PM

## 2017-03-25 NOTE — PLAN OF CARE
Problem: Fluid Volume Excess (Adult,Obstetrics,Pediatric)  Goal: Identify Related Risk Factors and Signs and Symptoms  Outcome: Ongoing (interventions implemented as appropriate)

## 2017-03-25 NOTE — PLAN OF CARE
Problem: Fluid Volume Excess (Adult,Obstetrics,Pediatric)  Goal: Balanced Intake/Output  Outcome: Ongoing (interventions implemented as appropriate)    03/23/17 0334   Fluid Volume Excess (Adult,Obstetrics,Pediatric)   Balanced Intake/Output making progress toward outcome         Problem: Cardiac: Heart Failure (Adult)  Goal: Signs and Symptoms of Listed Potential Problems Will be Absent or Manageable (Cardiac: Heart Failure)  Outcome: Ongoing (interventions implemented as appropriate)    03/24/17 0421   Cardiac: Heart Failure   Problems Assessed (Heart Failure) all   Problems Present (Heart Failure) none         Problem: Fall Risk (Adult)  Goal: Identify Related Risk Factors and Signs and Symptoms  Outcome: Ongoing (interventions implemented as appropriate)    03/22/17 0356   Fall Risk   Fall Risk: Related Risk Factors age-related changes;confusion/agitation;history of falls   Fall Risk: Signs and Symptoms presence of risk factors       Goal: Absence of Falls  Outcome: Ongoing (interventions implemented as appropriate)    03/22/17 0356   Fall Risk (Adult)   Absence of Falls making progress toward outcome

## 2017-03-26 NOTE — PLAN OF CARE
Problem: Fall Risk (Adult)  Goal: Identify Related Risk Factors and Signs and Symptoms  Outcome: Ongoing (interventions implemented as appropriate)    03/26/17 0417   Fall Risk   Fall Risk: Related Risk Factors age-related changes;history of falls   Fall Risk: Signs and Symptoms presence of risk factors       Goal: Absence of Falls  Outcome: Ongoing (interventions implemented as appropriate)    03/26/17 0417   Fall Risk (Adult)   Absence of Falls making progress toward outcome

## 2017-03-26 NOTE — PROGRESS NOTES
Pt wanting to know what her discharge options unsure if she wants to return to [place] more   Concerned if insurance will pay if she returns.      Discussed with pt home health options, Assisted living and sisters.  Pt states she was unable to afford   Assisted living or sitters.  States she was unable to care  For herself and daughter works.    Informed pt that  Since she had days left  That insurance would pay for  The rest of her rehab.    Continued Stay Note   Brigido     Patient Name: Yudy Vásquez  MRN: [id number]  Today's Date: 3/26/2017    Admit Date: 3/21/2017          Discharge Plan     None              Discharge Codes     None        Expected Discharge Date and Time     Expected Discharge Date Expected Discharge Time    Mar 25, 2017             Lavonne Holland RN

## 2017-03-26 NOTE — PLAN OF CARE
Problem: Patient Care Overview (Adult)  Goal: Plan of Care Review  Outcome: Ongoing (interventions implemented as appropriate)    03/26/17 0417   Coping/Psychosocial Response Interventions   Plan Of Care Reviewed With patient   Patient Care Overview   Progress no change   Outcome Evaluation   Outcome Summary/Follow up Plan pt anxious at times, does not want to go back to [place] r/t bad experience, SOA with exertion/position changes, edema to BLE's, lung sounds wheezes throughout       Goal: Adult Individualization and Mutuality  Outcome: Ongoing (interventions implemented as appropriate)  Goal: Discharge Needs Assessment  Outcome: Ongoing (interventions implemented as appropriate)    Problem: Fluid Volume Excess (Adult,Obstetrics,Pediatric)  Goal: Identify Related Risk Factors and Signs and Symptoms  Outcome: Ongoing (interventions implemented as appropriate)  Goal: Stable Weight  Outcome: Ongoing (interventions implemented as appropriate)  Goal: Balanced Intake/Output  Outcome: Ongoing (interventions implemented as appropriate)    Problem: Cardiac: Heart Failure (Adult)  Goal: Signs and Symptoms of Listed Potential Problems Will be Absent or Manageable (Cardiac: Heart Failure)  Outcome: Ongoing (interventions implemented as appropriate)    Problem: Fall Risk (Adult)  Goal: Identify Related Risk Factors and Signs and Symptoms  Outcome: Ongoing (interventions implemented as appropriate)  Goal: Absence of Falls  Outcome: Ongoing (interventions implemented as appropriate)

## 2017-03-27 NOTE — NURSING NOTE
SWATI teaching for heart failure  Instructed on management with daily weights and when to call the doctor or come to ER with what signs and symptoms pt needs reinforcement

## 2017-03-27 NOTE — PROGRESS NOTES
"  INPATIENT PROGRESS NOTE    Date of Admission: 3/21/2017  Length of Stay: 5  Primary Care Physician: Phuong Vera MD    Subjective   HPI: Ms. Vásquez is a 72 year old female admitted with AE SHF.    Interval History:  03/26/17 - Patient seen and examined. She tells me \"I'm just so tired\".  Reports breathing is a little better.  She denies fevers, chills, N/V/D.  Appetitie fair.  No further nosebleeds..  Review Of Systems:  Otherwise complete ROS is negative except as mentioned above    Objective      Temp:  [97.4 °F (36.3 °C)-98.6 °F (37 °C)] 98 °F (36.7 °C)  Heart Rate:  [64-71] 67  Resp:  [16-22] 20  BP: (119-143)/(55-61) 122/60    Gen: Alert, appropriate, pleasant and interactive  HEENT: EOMI, ATNC, MMM  Neck: Supple  Heart: S1S2, RRR  Lungs: bibasilar crackles  Abdomen: Soft, NTND, BS+  Extremities: Warm, well-perfused, + pulses  Skin: P/W/D  Neuro: A/O x3, speech clear    Results Review:    I have reviewed the labs, radiology results and diagnostic studies.    Lab Results   Component Value Date    TSH 1.370 03/03/2017         Results from last 7 days  Lab Units 03/26/17  0929 03/24/17  0601 03/23/17  0533  03/21/17  1717   SODIUM mmol/L 142 143 138  < > 135*   POTASSIUM mmol/L 4.1 4.4 4.9  < > 4.8   CHLORIDE mmol/L 90* 97* 96*  < > 94*   TOTAL CO2 mmol/L 45.0* 40.0* 35.0*  < > 33.0*   BUN mg/dL 23* 35* 50*  < > 49*   CREATININE mg/dL 0.60 0.80 1.10  < > 1.20   CALCIUM mg/dL 8.8 8.8 9.0  < > 8.7   BILIRUBIN mg/dL  --   --   --   --  0.8   ALK PHOS U/L  --   --   --   --  124   ALT (SGPT) U/L  --   --   --   --  98*   AST (SGOT) U/L  --   --   --   --  51*   GLUCOSE mg/dL 163* 212* 151*  < > 112*   < > = values in this interval not displayed.      Results from last 7 days  Lab Units 03/25/17  0550 03/21/17  1717   WBC 10*3/mm3 6.09 5.82   HEMOGLOBIN g/dL 9.0* 10.1*   HEMATOCRIT % 29.7* 32.7*   PLATELETS 10*3/mm3 224 244         Echo 3/22/17  Interpretation Summary      · The left ventricular cavity is " moderately dilated.  · Left ventricular wall thickness is consistent with mild concentric hypertrophy.  · Left ventricular function is severely decreased. Estimated EF = 30%.  · Left ventricular diastolic dysfunction (grade III) consistent with restrictive pattern.  · Right ventricular cavity is mild-to-moderately dilated.  · Left atrial cavity size is mildly dilated.  · Moderate-to-severe mitral valve regurgitation is present  · Moderate to severe tricuspid valve regurgitation is present.  · Calculated right ventricular systolic pressure from tricuspid regurgitation is 47 mmHg.  · Mild pulmonic valve regurgitation is present.         I have reviewed the medications.      Current Facility-Administered Medications:   •  acetaminophen (TYLENOL) tablet 650 mg, 650 mg, Oral, Q6H PRN, Esteban Cano DO, 650 mg at 03/25/17 2129  •  ALPRAZolam (XANAX) tablet 0.5 mg, 0.5 mg, Oral, TID PRN, Laurent Almonte MD, 0.5 mg at 03/26/17 1948  •  ammonium lactate (LAC-HYDRIN) 12 % lotion, , Topical, BID PRN, Laurent Almonte MD  •  atorvastatin (LIPITOR) tablet 20 mg, 20 mg, Oral, Nightly, Laurent Almonte MD, 20 mg at 03/25/17 2129  •  bisoprolol (ZEBeta) tablet 10 mg, 10 mg, Oral, Q24H, Jay Jay Cross MD, 10 mg at 03/26/17 1001  •  budesonide (PULMICORT) nebulizer solution 0.5 mg, 0.5 mg, Nebulization, TID - RT, Zbigniew Seaman MD, 0.5 mg at 03/26/17 2014  •  budesonide-formoterol (SYMBICORT) 160-4.5 MCG/ACT inhaler 2 puff, 2 puff, Inhalation, BID - RT, Laurent Almonte MD, 2 puff at 03/26/17 2014  •  buPROPion XL (WELLBUTRIN XL) 24 hr tablet 150 mg, 150 mg, Oral, QAM, Laurent Almonte MD, 150 mg at 03/26/17 0612  •  CHAPSTICK 1 each, 1 each, Apply externally, 4x Daily PRN, Zbigniew Seaman MD  •  furosemide (LASIX) injection 40 mg, 40 mg, Intravenous, BID, Jay Jay Cross MD, 40 mg at 03/26/17 1726  •  hydrOXYzine (VISTARIL) capsule 25 mg, 25 mg, Oral, TID PRN, Zbigniew Seaman MD,  25 mg at 03/26/17 1948  •  insulin detemir (LEVEMIR) injection 40 Units, 40 Units, Subcutaneous, Nightly, Laurent Almonte MD, 22 Units at 03/25/17 2057  •  ipratropium-albuterol (DUO-NEB) nebulizer solution 3 mL, 3 mL, Nebulization, Q6H PRN, Laurent Almonte MD, 3 mL at 03/23/17 1856  •  ipratropium-albuterol (DUO-NEB) nebulizer solution 3 mL, 3 mL, Nebulization, Q8H - RT, Zbigniew Seaman MD, 3 mL at 03/26/17 1541  •  levothyroxine (SYNTHROID, LEVOTHROID) tablet 125 mcg, 125 mcg, Oral, Q AM, Laurent Almonte MD, 125 mcg at 03/26/17 0612  •  lidocaine (LIDODERM) 5 % 1 patch, 1 patch, Transdermal, Q24H, Laurent Almonte MD, 1 patch at 03/24/17 2046  •  melatonin tablet 3 mg, 3 mg, Oral, Nightly PRN, Laurent Almonte MD, 3 mg at 03/25/17 2056  •  mirtazapine (REMERON) tablet 45 mg, 45 mg, Oral, Nightly, Laurent Almonte MD, 45 mg at 03/25/17 2056  •  pantoprazole (PROTONIX) EC tablet 40 mg, 40 mg, Oral, BID, Laurent Almonte MD, 40 mg at 03/26/17 1726  •  phenylephrine (RAISA-SYNEPHRINE) 0.5 % nasal spray 2 spray, 2 spray, Each Nare, Q6H, Zbigniew Seaman MD, 2 spray at 03/26/17 1725  •  polyethylene glycol (MIRALAX) powder 17 g, 17 g, Oral, Daily PRN, Laurent Almonte MD  •  rOPINIRole (REQUIP) tablet 1 mg, 1 mg, Oral, Nightly, Laurent Almonte MD, 1 mg at 03/25/17 2056  •  sacubitril-valsartan (ENTRESTO) 24-26 MG tablet 1 tablet, 1 tablet, Oral, Q12H, Jay Jay Cross MD, 1 tablet at 03/26/17 1002  •  sodium chloride (OCEAN) nasal spray 2 spray, 2 spray, Each Nare, PRN, Zbigniew Seaman MD, 2 spray at 03/24/17 1652  •  sodium chloride 0.9 % flush 1-10 mL, 1-10 mL, Intravenous, PRN, Laurent Almonte MD, 10 mL at 03/26/17 1726  •  sodium chloride 3 % nebulizer solution 4 mL, 4 mL, Nebulization, Q8H, Zbigniew Seaman MD, 4 mL at 03/26/17 0015  •  spironolactone (ALDACTONE) tablet 25 mg, 25 mg, Oral, Every Other Day, Laurent Almonte MD, 25 mg at  03/25/17 0824    Assessment/Plan     Problem List  Principal Problem:    Chronic systolic CHF (congestive heart failure), NYHA class 3  Active Problems:    Anemia of chronic disease    Chronic coronary artery disease    Chronic hypoxemic respiratory failure    Essential hypertension    Ischemic cardiomyopathy    Hypothyroidism due to acquired atrophy of thyroid    COPD, severe    Moderate to severe pulmonary hypertension    Chronic kidney disease (CKD) stage G3a/A1, moderately decreased glomerular filtration rate (GFR) between 45-59 mL/min/1.73 square meter and albuminuria creatinine ratio less than 30 mg/g    Elevated LFTs    Diabetic peripheral neuropathy associated with type 2 diabetes mellitus      Assessment/Plan    AE SCHF  - LVEF 30% per latest echo  Chronic Hypoxic Respiratory Failure  COPD  CKD III  DM  CAD  Hypothyroid  Anemia  Epistaxis          Cardiology consulted, patient followed by Dr. Cross.  Will continue diuresis and medical management of heart failure, echo completed. Daily weights, strict i/os.     Continue to hold enoxaparin and plavix.    Continue management of chronic medical problems as indicated.    Check hemoglobin in AM.     AM labs.     DVT Prophylaxis - mary alice hose    Placement pending, likely return to Onur Seaman MD 03/26/17 8:49 PM

## 2017-03-27 NOTE — PROGRESS NOTES
Continued Stay Note  CLAUDIA Fofana     Patient Name: Yudy Vásquez  MRN: [id number]  Today's Date: 3/27/2017    Admit Date: 3/21/2017          Discharge Plan       03/27/17 1013    Case Management/Social Work Plan    Plan SNF    Patient/Family In Agreement With Plan yes    Additional Comments PT may transfer to [facility] Call report to [phone] LUIS ENRIQUE Corbett fax DC Summary [phone]              Discharge Codes     None        Expected Discharge Date and Time     Expected Discharge Date Expected Discharge Time    Mar 25, 2017             Lavonne Hansen

## 2017-03-27 NOTE — DISCHARGE SUMMARY
[facility] HOSPITALIST   DISCHARGE SUMMARY      Name:  Yudy Vásquez   Age:  72 y.o.  Sex:  female  :  1945  MRN:  [id number]   Visit Number:  [id number]  Primary Care Physician:  Phuong Vera MD  Date of Discharge:  3/27/2017  Admission Date:  3/21/2017    Discharge Diagnosis:       Principal Problem:    Acute on chronic systolic congestive heart failure  Active Problems:    Chronic coronary artery disease    Ischemic cardiomyopathy    COPD, severe    Chronic kidney disease (CKD) stage G3a/A1, moderately decreased glomerular filtration rate (GFR) between 45-59 mL/min/1.73 square meter and albuminuria creatinine ratio less than 30 mg/g    Diabetic peripheral neuropathy associated with type 2 diabetes mellitus    Anemia of chronic disease    Essential hypertension    Hypothyroidism due to acquired atrophy of thyroid    Moderate to severe pulmonary hypertension    Elevated LFTs    Acute on chronic respiratory failure with hypoxia    Congestive heart failure, unspecified congestive heart failure chronicity, unspecified congestive heart failure type [I50.9]     History of Present Illness/Hospital Course:  The patient is a  72-year-old  lady with recent acute kidney injury and has been transferred to rehab. She developed acute shortness of breath and pulmonary edema, with increased edema and cough. She was admitted for acute systolic congestive heart failure. Her creatinine has remained stable. Previous admission it was up to 3.2. On this admission, creatinine was 1.2 and has remained normal despite current diuretics. Xray on admission confirmed acute CHF. She was admitted to hospitalist service and received iv diuresis. Dr Cross, on call for Dr Tucker, was called in consultation. 2D echo confirms EF 30%. She has received iv lasix, now converted to oral lasix. She has improved diuresis, with some unaccounted values of in and out but still down 1.3 liter fluid and feeling  much better than admission. Telemetry reviewed with sinus rhythm. Bumex will be continued daily only as needed in addition. Alprazolam continued as needed for anxiety. Medication risks and benefits discussed. JIMI reviewed in medical chart. Her blood pressure remains stable and tolerated 10mg Bisoprolol. She is also stable on Entresto and Spironolactone and has ICD device since 2003. She has improved hypoxic respiratory failure, on 5 liter nasal cannula and titrate to keep oyxgen saturation >92%.  She is generally weak and will require further Physical and Occupational therapy. She is stable for discharge to rehab facility today.    Of note, elevated liver enzymes noted on admission with higher dose statin, now improved on lower dose statin therapy instead.     She denies active chest pain, shortness of breath. Her cough has improved. She has complaint of weakness, for which she is eager to start physical therapy at rehab facility. She is feeling better than admission. Daughter at bedside for support.     Later patient with increased anxiety and shortness of breath and with late discharge pending the patient requested not to be transferred tonight. Medications reviewed and will be adjusted accordingly.     Consults:     Consults     Date and Time Order Name Status Description    3/22/2017 0755 Inpatient Consult to Cardiology Completed     3/3/2017 0357 Inpatient Consult to Nephrology Completed           Procedures Performed:             Vital Signs:    Temp:  [97.5 °F (36.4 °C)-99.6 °F (37.6 °C)] 98.6 °F (37 °C)  Heart Rate:  [63-74] 65  Resp:  [18-20] 18  BP: (104-134)/(45-83) 106/45    Physical Exam:      General Appearance:    Alert, cooperative, in no acute distress   Head:    Normocephalic, without obvious abnormality, atraumatic   Eyes:            Lids and lashes normal, conjunctivae and sclerae normal, no   icterus, no pallor, corneas clear, PERRLA   Ears:    Ears appear intact with no abnormalities noted    Throat:   No oral lesions, no thrush, oral mucosa moist   Neck:   No adenopathy, supple, trachea midline, no thyromegaly, no     carotid bruit, no JVD   Back:     No kyphosis present, no scoliosis present, no skin lesions,       erythema or scars, no tenderness to percussion or                   palpation,   range of motion normal   Lungs:     Bilateral crackles bases,respirations regular, even and                   unlabored    Heart:    Regular rhythm and normal rate, normal S1 and S2, no            murmur, no gallop, no rub, no click   Breast Exam:    Deferred   Abdomen:     Normal bowel sounds, no masses, no organomegaly, soft        non-tender, non-distended, no guarding, no rebound                 tenderness   Genitalia:    Deferred   Extremities:   Moves all extremities well, 1+= edema, no cyanosis, no              redness   Pulses:   Pulses palpable and equal bilaterally   Skin:   No bleeding, bruising or rash   Lymph nodes:   No palpable adenopathy   Neurologic:   Cranial nerves 2 - 12 grossly intact, sensation intact, DTR        present and equal bilaterally         Pertinent Lab Results:     Lab Results (all)     Procedure Component Value Units Date/Time    CBC Auto Differential [[id number]]  (Abnormal) Collected:  03/21/17 1717    Specimen:  Blood Updated:  03/21/17 1728     WBC 5.82 10*3/mm3      RBC 3.57 (L) 10*6/mm3      Hemoglobin 10.1 (L) g/dL      Hematocrit 32.7 (L) %      MCV 91.6 fL      MCH 28.3 pg      MCHC 30.9 g/dL      RDW 17.2 (H) %      RDW-SD 58.4 (H) fl      MPV 10.4 fL      Platelets 244 10*3/mm3      Neutrophil % 80.2 (H) %      Lymphocyte % 8.6 (L) %      Monocyte % 9.1 %      Eosinophil % 0.9 %      Basophil % 0.7 %      Immature Grans % 0.5 %      Neutrophils, Absolute 4.67 10*3/mm3      Lymphocytes, Absolute 0.50 (L) 10*3/mm3      Monocytes, Absolute 0.53 10*3/mm3      Eosinophils, Absolute 0.05 10*3/mm3      Basophils, Absolute 0.04 10*3/mm3      Immature Grans, Absolute 0.03  10*3/mm3      nRBC 0.0 /100 WBC     CBC & Differential [[id number]] Collected:  03/21/17 1717    Specimen:  Blood Updated:  03/21/17 1728    Narrative:       The following orders were created for panel order CBC & Differential.  Procedure                               Abnormality         Status                     ---------                               -----------         ------                     CBC Auto Differential[[id number]]         Abnormal            Final result                 Please view results for these tests on the individual orders.    Troponin [[id number]]  (Abnormal) Collected:  03/21/17 1717    Specimen:  Blood Updated:  03/21/17 1756     Troponin I 0.035 (H) ng/mL     Narrative:       Normal Patient Upper Reference Limit (URL) (99th Percentile)=0.03 ng/mL   Non-AMI Illness Reference Limit=0.03-0.11 ng/mL   AMI Confirmation=0.12 ng/mL and above    Comprehensive Metabolic Panel [[id number]]  (Abnormal) Collected:  03/21/17 1717    Specimen:  Blood Updated:  03/21/17 1800     Glucose 112 (H) mg/dL      BUN 49 (H) mg/dL      Creatinine 1.20 mg/dL      Sodium 135 (L) mmol/L      Potassium 4.8 mmol/L      Chloride 94 (L) mmol/L      CO2 33.0 (H) mmol/L      Calcium 8.7 mg/dL      Total Protein 6.7 g/dL      Albumin 3.80 g/dL      ALT (SGPT) 98 (H) U/L      AST (SGOT) 51 (H) U/L      Alkaline Phosphatase 124 U/L      Total Bilirubin 0.8 mg/dL      eGFR Non African Amer 44 (L) mL/min/1.73      Globulin 2.9 gm/dL      A/G Ratio 1.3 g/dL      BUN/Creatinine Ratio 40.8 (H)     Anion Gap 12.8 mmol/L     Narrative:       The MDRD GFR formula is only valid for adults with stable renal function between ages 18 and 70.    BNP [[id number]]  (Abnormal) Collected:  03/21/17 1717    Specimen:  Blood Updated:  03/21/17 1803     proBNP 8740.0 (C) pg/mL     POC Glucose Fingerstick [[id number]]  (Normal) Collected:  03/21/17 2019    Specimen:  Blood Updated:  03/21/17 2026     Glucose 96 mg/dL       Serial Number: [id number]     : [id number]       Renal Function Panel [[id number]]  (Abnormal) Collected:  03/22/17 0527    Specimen:  Blood Updated:  03/22/17 0655     Glucose 94 mg/dL      BUN 49 (H) mg/dL      Creatinine 1.20 mg/dL      Sodium 138 mmol/L      Potassium 5.1 mmol/L      Chloride 98 mmol/L      CO2 32.0 (H) mmol/L      Calcium 8.9 mg/dL      Albumin 3.50 g/dL      Phosphorus 4.5 mg/dL      Anion Gap 13.1 mmol/L      BUN/Creatinine Ratio 40.8 (H)     eGFR Non  Amer 44 (L) mL/min/1.73     Narrative:       The MDRD GFR formula is only valid for adults with stable renal function between ages 18 and 70.    POC Glucose Fingerstick [[id number]]  (Normal) Collected:  03/22/17 0612    Specimen:  Blood Updated:  03/22/17 0738     Glucose 88 mg/dL       Serial Number: [id number]    : [id number]       POC Glucose Fingerstick [[id number]]  (Abnormal) Collected:  03/22/17 1141    Specimen:  Blood Updated:  03/22/17 1148     Glucose 143 (H) mg/dL       Serial Number: [id number]    : [id number]       Hemoglobin A1c [[id number]]  (Abnormal) Collected:  03/22/17 0527    Specimen:  Blood Updated:  03/22/17 1458     Hemoglobin A1C 7.5 (H) %     Narrative:       Expected HgbA1C results:     3% to 6% HgbA1C      HgbA1C                 Estimated Average Glucose     5%                              97 mg/dl   6%                             126 mg/dl   7%                             154 mg/dl   8%                             183 mg/dl   9%                             212 mg/dl  >10%                           >240 mg/dl      POC Glucose Fingerstick [[id number]]  (Normal) Collected:  03/22/17 1620    Specimen:  Blood Updated:  03/22/17 1625     Glucose 121 mg/dL       Serial Number: [id number]    : [id number]       POC Glucose Fingerstick [[id number]]  (Abnormal) Collected:  03/22/17 1921    Specimen:  Blood Updated:  03/22/17 1941     Glucose 163 (H) mg/dL       Serial Number: [id number]    : [id number]       Renal Function Panel [[id number]]   (Abnormal) Collected:  03/23/17 0533    Specimen:  Blood Updated:  03/23/17 0704     Glucose 151 (H) mg/dL      BUN 50 (H) mg/dL      Creatinine 1.10 mg/dL      Sodium 138 mmol/L      Potassium 4.9 mmol/L      Chloride 96 (L) mmol/L      CO2 35.0 (H) mmol/L      Calcium 9.0 mg/dL      Albumin 3.50 g/dL      Phosphorus 4.1 mg/dL      Anion Gap 11.9 mmol/L      BUN/Creatinine Ratio 45.5 (H)     eGFR Non  Amer 49 (L) mL/min/1.73     Narrative:       The MDRD GFR formula is only valid for adults with stable renal function between ages 18 and 70.    POC Glucose Fingerstick [[id number]]  (Abnormal) Collected:  03/23/17 0606    Specimen:  Blood Updated:  03/23/17 0801     Glucose 149 (H) mg/dL       Serial Number: [id number]    : [id number]       POC Glucose Fingerstick [[id number]]  (Abnormal) Collected:  03/23/17 1055    Specimen:  Blood Updated:  03/23/17 1106     Glucose 142 (H) mg/dL       Serial Number: [id number]    : [id number]       POC Glucose Fingerstick [[id number]]  (Abnormal) Collected:  03/23/17 1552    Specimen:  Blood Updated:  03/23/17 1621     Glucose 191 (H) mg/dL       Serial Number: [id number]    : [id number]       POC Glucose Fingerstick [[id number]]  (Abnormal) Collected:  03/23/17 1943    Specimen:  Blood Updated:  03/23/17 1945     Glucose 199 (H) mg/dL       Serial Number: [id number]    : [id number]       POC Glucose Fingerstick [[id number]]  (Abnormal) Collected:  03/24/17 0607    Specimen:  Blood Updated:  03/24/17 0621     Glucose 265 (H) mg/dL       Serial Number: [id number]    : [id number]       Renal Function Panel [[id number]]  (Abnormal) Collected:  03/24/17 0601    Specimen:  Blood Updated:  03/24/17 0719     Glucose 212 (H) mg/dL      BUN 35 (H) mg/dL      Creatinine 0.80 mg/dL      Sodium 143 mmol/L      Potassium 4.4 mmol/L      Chloride 97 (L) mmol/L      CO2 40.0 (H) mmol/L      Calcium 8.8 mg/dL      Albumin 3.10 (L) g/dL      Phosphorus 3.5 mg/dL      Anion Gap  10.4 mmol/L      BUN/Creatinine Ratio 43.8 (H)     eGFR Non African Amer 70 mL/min/1.73     Narrative:       The MDRD GFR formula is only valid for adults with stable renal function between ages 18 and 70.    MRSA Screen Culture [[id number]]  (Normal) Collected:  03/21/17 2210    Specimen:  Swab from Nares Updated:  03/24/17 0855     MRSA SCREEN CX No Methicillin Resistant Staphylococcus aureus isolated    Acinetobacter Screen [[id number]]  (Normal) Collected:  03/21/17 2210    Specimen:  Swab from Nares Updated:  03/24/17 0855     ACINETOBACTER SCREEN CX No growth    VRE Culture [[id number]]  (Normal) Collected:  03/21/17 2210    Specimen:  Swab from Per Rectum Updated:  03/24/17 0856     VRE SCREEN CX No Vancomycin Resistant Enterococcus Isolated    POC Glucose Fingerstick [[id number]]  (Abnormal) Collected:  03/24/17 1037    Specimen:  Blood Updated:  03/24/17 1046     Glucose 194 (H) mg/dL       Serial Number: [id number]    : [id number]       POC Glucose Fingerstick [[id number]]  (Abnormal) Collected:  03/24/17 1522    Specimen:  Blood Updated:  03/24/17 1535     Glucose 230 (H) mg/dL       Serial Number: [id number]    : [id number]       POC Glucose Fingerstick [[id number]]  (Abnormal) Collected:  03/24/17 2141    Specimen:  Blood Updated:  03/24/17 2200     Glucose 268 (H) mg/dL       Serial Number: [id number]    : [id number]       POC Glucose Fingerstick [[id number]]  (Normal) Collected:  03/25/17 0620    Specimen:  Blood Updated:  03/25/17 0655     Glucose 109 mg/dL       Serial Number: [id number]    : [id number]       CBC (No Diff) [[id number]]  (Abnormal) Collected:  03/25/17 0550    Specimen:  Blood Updated:  03/25/17 0717     WBC 6.09 10*3/mm3      RBC 3.23 (L) 10*6/mm3      Hemoglobin 9.0 (L) g/dL      Hematocrit 29.7 (L) %      MCV 92.0 fL      MCH 27.9 pg      MCHC 30.3 g/dL      RDW 17.1 (H) %      RDW-SD 57.9 (H) fl      MPV 10.5 fL      Platelets 224 10*3/mm3     POC Glucose Fingerstick [[id number]]   (Abnormal) Collected:  03/25/17 1104    Specimen:  Blood Updated:  03/25/17 1116     Glucose 197 (H) mg/dL       Serial Number: [id number]    : [id number]       POC Glucose Fingerstick [[id number]]  (Abnormal) Collected:  03/25/17 1550    Specimen:  Blood Updated:  03/25/17 1600     Glucose 273 (H) mg/dL       Serial Number: [id number]    : [id number]       POC Glucose Fingerstick [[id number]]  (Abnormal) Collected:  03/25/17 2004    Specimen:  Blood Updated:  03/25/17 2021     Glucose 276 (H) mg/dL       Serial Number: [id number]    : [id number]       POC Glucose Fingerstick [[id number]]  (Abnormal) Collected:  03/26/17 0609    Specimen:  Blood Updated:  03/26/17 0625     Glucose 165 (H) mg/dL       Serial Number: [id number]    : [id number]       Basic Metabolic Panel [[id number]]  (Abnormal) Collected:  03/26/17 0929    Specimen:  Blood Updated:  03/26/17 1019     Glucose 163 (H) mg/dL      BUN 23 (H) mg/dL      Creatinine 0.60 mg/dL      Sodium 142 mmol/L      Potassium 4.1 mmol/L      Chloride 90 (L) mmol/L      CO2 45.0 (C) mmol/L      Calcium 8.8 mg/dL      eGFR Non African Amer 98 mL/min/1.73      BUN/Creatinine Ratio 38.3 (H)     Anion Gap 11.1 mmol/L     Narrative:       The MDRD GFR formula is only valid for adults with stable renal function between ages 18 and 70.    POC Glucose Fingerstick [[id number]]  (Abnormal) Collected:  03/26/17 1051    Specimen:  Blood Updated:  03/26/17 1100     Glucose 184 (H) mg/dL       Serial Number: [id number]    : [id number]       POC Glucose Fingerstick [[id number]]  (Abnormal) Collected:  03/26/17 1555    Specimen:  Blood Updated:  03/26/17 1605     Glucose 289 (H) mg/dL       Serial Number: [id number]    : [id number]       POC Glucose Fingerstick [[id number]]  (Abnormal) Collected:  03/26/17 2102    Specimen:  Blood Updated:  03/26/17 2125     Glucose 272 (H) mg/dL       Serial Number: [id number]    : [id number]       CBC (No Diff) [[id number]]  (Abnormal)  Collected:  03/27/17 0517    Specimen:  Blood Updated:  03/27/17 0559     WBC 6.76 10*3/mm3      RBC 3.42 (L) 10*6/mm3      Hemoglobin 9.6 (L) g/dL      Hematocrit 31.8 (L) %      MCV 93.0 fL      MCH 28.1 pg      MCHC 30.2 g/dL      RDW 17.1 (H) %      RDW-SD 58.8 (H) fl      MPV 10.6 fL      Platelets 216 10*3/mm3     POC Glucose Fingerstick [[id number]]  (Abnormal) Collected:  03/27/17 0622    Specimen:  Blood Updated:  03/27/17 0647     Glucose 180 (H) mg/dL       Serial Number: [id number]    : [id number]       POC Glucose Fingerstick [[id number]]  (Abnormal) Collected:  03/27/17 1051    Specimen:  Blood Updated:  03/27/17 1055     Glucose 276 (H) mg/dL       Serial Number: [id number]    : [id number]             Pertinent Radiology Results:    Imaging Results (all)     Procedure Component Value Units Date/Time    XR Chest 2 View [[id number]] Collected:  03/21/17 1803     Updated:  03/21/17 1804    Narrative:       FINAL REPORT    CLINICAL HISTORY:  SOB    FINDINGS:  Findings and impression: The lungs are well inflated. The heart size is mildly enlarged. A pacemaker is in place. There may be a small left pleural effusion. No pneumothorax.there may be early vascular congestion and increased interstitial markings   bilaterally. The findings suggest early pulmonary edema. No consolidation.      Impression:       Authenticated by Mor Yin MD on 03/21/2017 06:03:30 PM    XR Chest 1 View [[id number]] Collected:  03/24/17 0821     Updated:  03/24/17 0825    Narrative:       PROCEDURE: XR CHEST 1 VW-     HISTORY: SOA; I50.9-Heart failure, unspecified; R79.89-Other specified  abnormal findings of blood chemistry     COMPARISON: March 21, 2017.     FINDINGS: A left subclavian ICD is in place. The heart is enlarged. The  mediastinum is unremarkable. There are increased interstitial markings  consistent with pulmonary edema. No significant effusions are evident.  There is no pneumothorax.  There are no acute osseous  abnormalities.           Impression:       Cardiomegaly and interstitial pulmonary edema.     Continued followup is recommended.     This report was finalized on 3/24/2017 8:23 AM by Mercedes Zhou M.D..          Condition on Discharge:  Stable        Code status during the hospital stay: Full code       Discharge Disposition:    Rehab Facility or Unit (DC - External)    Discharge Medication:     Fahad Yudy Ann   Home Medication Instructions ELSY:[id number]    Printed on:03/27/17 0517   Medication Information                      acetaminophen (TYLENOL) 325 MG tablet  Take 2 tablets by mouth Every 6 (Six) Hours As Needed for Mild Pain (1-3).             ALPRAZolam (XANAX) 0.5 MG tablet  Take 1 tablet by mouth 3 (Three) Times a Day As Needed (panic attack) for up to 9 doses.             ammonium lactate (LAC-HYDRIN) 12 % lotion  Apply  topically 2 (Two) Times a Day As Needed for dry skin. Apply liberally to affected area BID. Refill PRN up to 3 bottles/mo for 12 mo             atorvastatin (LIPITOR) 20 MG tablet  Take 1 tablet by mouth Every Night.             bisoprolol (ZEBeta) 10 MG tablet  Take 1 tablet by mouth Daily.             budesonide-formoterol (SYMBICORT) 160-4.5 MCG/ACT inhaler  Inhale 2 puffs 2 (Two) Times a Day. Rinse mouth with water after use.             bumetanide (BUMEX) 1 MG tablet  Take 0.5 tablets by mouth Daily As Needed (for weight gain of 3 or more lbs or a weekly wt gain of 5 or more lbs).             buPROPion XL (WELLBUTRIN XL) 150 MG 24 hr tablet  Take 1 tablet by mouth Every Morning.             clopidogrel (PLAVIX) 75 MG tablet  TAKE ONE TABLET BY MOUTH DAILY             escitalopram (LEXAPRO) 10 MG tablet  Take 1 tablet by mouth Daily.             furosemide (LASIX) 40 MG tablet  Take 1 tablet by mouth 2 (Two) Times a Day.             hydrocortisone 2.5 % cream  Apply  topically 2 (Two) Times a Day for 14 days. Apply to clean dry skin before other lotions. Do not use on  face.             hydrOXYzine (VISTARIL) 25 MG capsule  Take 25 mg by mouth 3 (Three) Times a Day As Needed for Itching.             insulin aspart (novoLOG) 100 UNIT/ML injection  Inject 0-7 Units under the skin 4 (Four) Times a Day Before Meals & at Bedtime.             insulin detemir (LEVEMIR) 100 UNIT/ML injection  Inject 40 Units under the skin Every Night.             ipratropium-albuterol (COMBIVENT RESPIMAT)  MCG/ACT inhaler  Inhale 1 puff 4 (Four) Times a Day As Needed for shortness of air.             levothyroxine (SYNTHROID, LEVOTHROID) 125 MCG tablet  TAKE ONE TABLET BY MOUTH DAILY             lidocaine (LIDODERM) 5 %  Place 1 patch on the skin every day.             melatonin 3 MG tablet  Take 1 tablet by mouth At Night As Needed for sleep.             metoclopramide (REGLAN) 5 MG tablet  Take 2.5 mg by mouth 2 (Two) Times a Day.             mirtazapine (REMERON) 45 MG tablet  Take 1 tablet by mouth Every Night.             pantoprazole (PROTONIX) 40 MG EC tablet  Take 1 tablet by mouth 2 (Two) Times a Day.             polyethylene glycol (MIRALAX) packet  Take 17 g by mouth Daily As Needed (constipation).             ranitidine (ZANTAC) 150 MG capsule  Take 1 capsule by mouth 2 (Two) Times a Day As Needed for indigestion or heartburn.             rOPINIRole (REQUIP) 1 MG tablet  Take 1 tablet by mouth Every Night. Take 1 hour before bedtime.             sacubitril-valsartan (ENTRESTO) 24-26 MG tablet  Take 1 tablet by mouth Every 12 (Twelve) Hours.             sodium chloride (OCEAN) 0.65 % nasal spray  2 sprays into each nostril As Needed for Congestion.             spironolactone (ALDACTONE) 25 MG tablet  Take 1 tablet by mouth Every Other Day. Take this medication on the days you DO NOT take lasix (furosimide)             Sunscreens (CHAPSTICK) stick  Apply 1 each topically 4 (Four) Times a Day As Needed (chapped lips).             tiotropium (SPIRIVA HANDIHALER) 18 MCG per inhalation  capsule  Place 2 puffs into inhaler and inhale Daily.                 Discharge Diet:     Diet Instructions     Diet: Cardiac, Consistent Carbohydrate; Thin Liquids, No Restrictions       Discharge Diet:   Cardiac  Consistent Carbohydrate      Fluid Consistency:  Thin Liquids, No Restrictions                 Activity at Discharge:     Activity Instructions     Activity as Tolerated                     Follow-up Appointments:    Future Appointments  Date Time Provider Department Center   4/24/2017 1:15 PM Nicolás Mendoza MD [facility] None     Additional Instructions for the Follow-ups that You Need to Schedule     Discharge Follow-up with PCP    As directed    Follow Up Details:  2 day with facility physician       Discharge Follow-up with Specialty    As directed    Specialty:  Dr Tucker follow up CHF exacerbation   Follow Up:  1 Week                 Test Results Pending at Discharge:           Mariya Guzman DO  03/27/17  4:51 PM    Time:  I spent 35 minutes preparing discharge counseling and teaching.

## 2017-03-27 NOTE — PLAN OF CARE
Problem: Patient Care Overview (Adult)  Goal: Plan of Care Review  Outcome: Ongoing (interventions implemented as appropriate)    03/27/17 0341   Coping/Psychosocial Response Interventions   Plan Of Care Reviewed With patient   Patient Care Overview   Progress no change   Outcome Evaluation   Outcome Summary/Follow up Plan pt anxious at times, soa with exertion, edema to BLE's, SR on tele monitor       Goal: Adult Individualization and Mutuality  Outcome: Ongoing (interventions implemented as appropriate)  Goal: Discharge Needs Assessment  Outcome: Ongoing (interventions implemented as appropriate)    Problem: Fluid Volume Excess (Adult,Obstetrics,Pediatric)  Goal: Identify Related Risk Factors and Signs and Symptoms  Outcome: Ongoing (interventions implemented as appropriate)  Goal: Stable Weight  Outcome: Ongoing (interventions implemented as appropriate)  Goal: Balanced Intake/Output  Outcome: Ongoing (interventions implemented as appropriate)    Problem: Cardiac: Heart Failure (Adult)  Goal: Signs and Symptoms of Listed Potential Problems Will be Absent or Manageable (Cardiac: Heart Failure)  Outcome: Ongoing (interventions implemented as appropriate)    Problem: Fall Risk (Adult)  Goal: Identify Related Risk Factors and Signs and Symptoms  Outcome: Ongoing (interventions implemented as appropriate)  Goal: Absence of Falls  Outcome: Ongoing (interventions implemented as appropriate)

## 2017-03-28 NOTE — PLAN OF CARE
Problem: Fall Risk (Adult)  Goal: Identify Related Risk Factors and Signs and Symptoms  Outcome: Ongoing (interventions implemented as appropriate)    03/27/17 0341   Fall Risk   Fall Risk: Related Risk Factors age-related changes;history of falls   Fall Risk: Signs and Symptoms presence of risk factors       Goal: Absence of Falls  Outcome: Ongoing (interventions implemented as appropriate)    03/27/17 0341   Fall Risk (Adult)   Absence of Falls making progress toward outcome

## 2017-03-28 NOTE — DISCHARGE SUMMARY
[facility]   DISCHARGE SUMMARY      Name:  Yudy Vásquez   Age:  72 y.o.  Sex:  female  :  1945  MRN:  [id number]   Visit Number:  [id number]  Primary Care Physician:  Phuong Vera MD  Date of Discharge:  3/28/2017  Admission Date:  3/21/2017    Discharge Diagnosis:       Principal Problem:    Acute on chronic systolic congestive heart failure  Active Problems:    Chronic coronary artery disease    Ischemic cardiomyopathy    COPD, severe    Chronic kidney disease (CKD) stage G3a/A1, moderately decreased glomerular filtration rate (GFR) between 45-59 mL/min/1.73 square meter and albuminuria creatinine ratio less than 30 mg/g    Diabetic peripheral neuropathy associated with type 2 diabetes mellitus    Anemia of chronic disease    Essential hypertension    Hypothyroidism due to acquired atrophy of thyroid    Moderate to severe pulmonary hypertension    Elevated LFTs    Acute on chronic respiratory failure with hypoxia    Congestive heart failure, unspecified congestive heart failure chronicity, unspecified congestive heart failure type [I50.9]        History of Present Illness/Hospital Course:    Addendum today: The patient stayed overnight with increased anxiety and shortness of breath, with more anxiety over leaving late last night with inability to get all of her medications overnight. Today, her hypoxia is further improved to 3.5-4Liter nasal cannula instead of 5 liter. She is stable on lasix scheduled. She will have Bumex daily only as needed for worsening edema. She is very calm and feeling good about her discharge to rehab today. Daughter came by and provided support as well. I have again reviewed notes, labs, vitals, medications. She is stable for transfer to rehab at this time. She denies chest pain, shortness of breath, abdominal pain. She does admit to generalized weakness. She will benefit from further physical and occupational therapy.     The patient is a   72-year-old  lady with recent acute kidney injury and has been transferred to rehab. She developed acute shortness of breath and pulmonary edema, with increased edema and cough. She was admitted for acute systolic congestive heart failure. Her creatinine has remained stable. Previous admission it was up to 3.2. On this admission, creatinine was 1.2 and has remained normal despite current diuretics. Xray on admission confirmed acute CHF. She was admitted to hospitalist service and received iv diuresis. Dr Cross, on call for Dr Tucker, was called in consultation. 2D echo confirms EF 30%. She has received iv lasix, now converted to oral lasix. She has improved diuresis, with some unaccounted values of in and out but still down 1.3 liter fluid and feeling much better than admission. Telemetry reviewed with sinus rhythm. Bumex will be continued daily only as needed in addition. Alprazolam continued as needed for anxiety. Medication risks and benefits discussed. JIMI reviewed in medical chart. Her blood pressure remains stable and tolerated 10mg Bisoprolol. She is also stable on Entresto and Spironolactone and has ICD device since 2003. She has improved hypoxic respiratory failure, on 5 liter nasal cannula and titrate to keep oyxgen saturation >92%.  She is generally weak and will require further Physical and Occupational therapy. She is stable for discharge to rehab facility today.     Of note, elevated liver enzymes noted on admission with higher dose statin, now improved on lower dose statin therapy instead.             Consults:     Consults     Date and Time Order Name Status Description    3/22/2017 0755 Inpatient Consult to Cardiology Completed     3/3/2017 0357 Inpatient Consult to Nephrology Completed           Procedures Performed:             Vital Signs:    Temp:  [97.8 °F (36.6 °C)-98.6 °F (37 °C)] 97.9 °F (36.6 °C)  Heart Rate:  [63-72] 72  Resp:  [18-22] 22  BP: (106-133)/(44-59)  120/51    Physical Exam:      General Appearance:    Alert, cooperative, in no acute distress   Head:    Normocephalic, without obvious abnormality, atraumatic   Eyes:            Lids and lashes normal, conjunctivae and sclerae normal, no   icterus, no pallor, corneas clear, PERRLA   Ears:    Ears appear intact with no abnormalities noted   Throat:   No oral lesions, no thrush, oral mucosa moist   Neck:   No adenopathy, supple, trachea midline, no thyromegaly, no     carotid bruit, no JVD   Back:     No kyphosis present, no scoliosis present, no skin lesions,       erythema or scars, no tenderness to percussion or                   palpation,   range of motion normal   Lungs:     Crackles right greater than left improved bases ,respirations regular, even and unlabored without wheezing    Heart:    Regular rhythm and normal rate, normal S1 and S2, no            murmur, no gallop, no rub, no click   Breast Exam:    Deferred   Abdomen:     Normal bowel sounds, no masses, no organomegaly, soft        non-tender, non-distended, no guarding, no rebound                 tenderness   Genitalia:    Deferred   Extremities:   Moves all extremities well, trace edema, no cyanosis, no              redness   Pulses:   Pulses palpable and equal bilaterally   Skin:   No bleeding, bruising or rash   Lymph nodes:   No palpable adenopathy   Neurologic:   Cranial nerves 2 - 12 grossly intact, sensation intact, DTR        present and equal bilaterally         Pertinent Lab Results:     Lab Results (all)     Procedure Component Value Units Date/Time    CBC Auto Differential [[id number]]  (Abnormal) Collected:  03/21/17 1717    Specimen:  Blood Updated:  03/21/17 1728     WBC 5.82 10*3/mm3      RBC 3.57 (L) 10*6/mm3      Hemoglobin 10.1 (L) g/dL      Hematocrit 32.7 (L) %      MCV 91.6 fL      MCH 28.3 pg      MCHC 30.9 g/dL      RDW 17.2 (H) %      RDW-SD 58.4 (H) fl      MPV 10.4 fL      Platelets 244 10*3/mm3      Neutrophil % 80.2 (H) %       Lymphocyte % 8.6 (L) %      Monocyte % 9.1 %      Eosinophil % 0.9 %      Basophil % 0.7 %      Immature Grans % 0.5 %      Neutrophils, Absolute 4.67 10*3/mm3      Lymphocytes, Absolute 0.50 (L) 10*3/mm3      Monocytes, Absolute 0.53 10*3/mm3      Eosinophils, Absolute 0.05 10*3/mm3      Basophils, Absolute 0.04 10*3/mm3      Immature Grans, Absolute 0.03 10*3/mm3      nRBC 0.0 /100 WBC     CBC & Differential [[id number]] Collected:  03/21/17 1717    Specimen:  Blood Updated:  03/21/17 1728    Narrative:       The following orders were created for panel order CBC & Differential.  Procedure                               Abnormality         Status                     ---------                               -----------         ------                     CBC Auto Differential[[id number]]         Abnormal            Final result                 Please view results for these tests on the individual orders.    Troponin [[id number]]  (Abnormal) Collected:  03/21/17 1717    Specimen:  Blood Updated:  03/21/17 1756     Troponin I 0.035 (H) ng/mL     Narrative:       Normal Patient Upper Reference Limit (URL) (99th Percentile)=0.03 ng/mL   Non-AMI Illness Reference Limit=0.03-0.11 ng/mL   AMI Confirmation=0.12 ng/mL and above    Comprehensive Metabolic Panel [[id number]]  (Abnormal) Collected:  03/21/17 1717    Specimen:  Blood Updated:  03/21/17 1800     Glucose 112 (H) mg/dL      BUN 49 (H) mg/dL      Creatinine 1.20 mg/dL      Sodium 135 (L) mmol/L      Potassium 4.8 mmol/L      Chloride 94 (L) mmol/L      CO2 33.0 (H) mmol/L      Calcium 8.7 mg/dL      Total Protein 6.7 g/dL      Albumin 3.80 g/dL      ALT (SGPT) 98 (H) U/L      AST (SGOT) 51 (H) U/L      Alkaline Phosphatase 124 U/L      Total Bilirubin 0.8 mg/dL      eGFR Non African Amer 44 (L) mL/min/1.73      Globulin 2.9 gm/dL      A/G Ratio 1.3 g/dL      BUN/Creatinine Ratio 40.8 (H)     Anion Gap 12.8 mmol/L     Narrative:       The MDRD GFR formula is only valid for  adults with stable renal function between ages 18 and 70.    BNP [[id number]]  (Abnormal) Collected:  03/21/17 1717    Specimen:  Blood Updated:  03/21/17 1803     proBNP 8740.0 (C) pg/mL     POC Glucose Fingerstick [[id number]]  (Normal) Collected:  03/21/17 2019    Specimen:  Blood Updated:  03/21/17 2026     Glucose 96 mg/dL       Serial Number: [id number]    : [id number]       Renal Function Panel [[id number]]  (Abnormal) Collected:  03/22/17 0527    Specimen:  Blood Updated:  03/22/17 0655     Glucose 94 mg/dL      BUN 49 (H) mg/dL      Creatinine 1.20 mg/dL      Sodium 138 mmol/L      Potassium 5.1 mmol/L      Chloride 98 mmol/L      CO2 32.0 (H) mmol/L      Calcium 8.9 mg/dL      Albumin 3.50 g/dL      Phosphorus 4.5 mg/dL      Anion Gap 13.1 mmol/L      BUN/Creatinine Ratio 40.8 (H)     eGFR Non  Amer 44 (L) mL/min/1.73     Narrative:       The MDRD GFR formula is only valid for adults with stable renal function between ages 18 and 70.    POC Glucose Fingerstick [[id number]]  (Normal) Collected:  03/22/17 0612    Specimen:  Blood Updated:  03/22/17 0738     Glucose 88 mg/dL       Serial Number: [id number]    : [id number]       POC Glucose Fingerstick [[id number]]  (Abnormal) Collected:  03/22/17 1141    Specimen:  Blood Updated:  03/22/17 1148     Glucose 143 (H) mg/dL       Serial Number: [id number]    : [id number]       Hemoglobin A1c [[id number]]  (Abnormal) Collected:  03/22/17 0527    Specimen:  Blood Updated:  03/22/17 1458     Hemoglobin A1C 7.5 (H) %     Narrative:       Expected HgbA1C results:     3% to 6% HgbA1C      HgbA1C                 Estimated Average Glucose     5%                              97 mg/dl   6%                             126 mg/dl   7%                             154 mg/dl   8%                             183 mg/dl   9%                             212 mg/dl  >10%                           >240 mg/dl      POC Glucose Fingerstick [[id number]]  (Normal) Collected:  03/22/17  1620    Specimen:  Blood Updated:  03/22/17 1625     Glucose 121 mg/dL       Serial Number: [id number]    : [id number]       POC Glucose Fingerstick [[id number]]  (Abnormal) Collected:  03/22/17 1921    Specimen:  Blood Updated:  03/22/17 1941     Glucose 163 (H) mg/dL       Serial Number: [id number]    : [id number]       Renal Function Panel [[id number]]  (Abnormal) Collected:  03/23/17 0533    Specimen:  Blood Updated:  03/23/17 0704     Glucose 151 (H) mg/dL      BUN 50 (H) mg/dL      Creatinine 1.10 mg/dL      Sodium 138 mmol/L      Potassium 4.9 mmol/L      Chloride 96 (L) mmol/L      CO2 35.0 (H) mmol/L      Calcium 9.0 mg/dL      Albumin 3.50 g/dL      Phosphorus 4.1 mg/dL      Anion Gap 11.9 mmol/L      BUN/Creatinine Ratio 45.5 (H)     eGFR Non  Amer 49 (L) mL/min/1.73     Narrative:       The MDRD GFR formula is only valid for adults with stable renal function between ages 18 and 70.    POC Glucose Fingerstick [[id number]]  (Abnormal) Collected:  03/23/17 0606    Specimen:  Blood Updated:  03/23/17 0801     Glucose 149 (H) mg/dL       Serial Number: [id number]    : [id number]       POC Glucose Fingerstick [[id number]]  (Abnormal) Collected:  03/23/17 1055    Specimen:  Blood Updated:  03/23/17 1106     Glucose 142 (H) mg/dL       Serial Number: [id number]    : [id number]       POC Glucose Fingerstick [[id number]]  (Abnormal) Collected:  03/23/17 1552    Specimen:  Blood Updated:  03/23/17 1621     Glucose 191 (H) mg/dL       Serial Number: [id number]    : [id number]       POC Glucose Fingerstick [[id number]]  (Abnormal) Collected:  03/23/17 1943    Specimen:  Blood Updated:  03/23/17 1945     Glucose 199 (H) mg/dL       Serial Number: [id number]    : [id number]       POC Glucose Fingerstick [[id number]]  (Abnormal) Collected:  03/24/17 0607    Specimen:  Blood Updated:  03/24/17 0621     Glucose 265 (H) mg/dL       Serial Number: [id number]    : [id number]       Renal Function Panel  [[id number]]  (Abnormal) Collected:  03/24/17 0601    Specimen:  Blood Updated:  03/24/17 0719     Glucose 212 (H) mg/dL      BUN 35 (H) mg/dL      Creatinine 0.80 mg/dL      Sodium 143 mmol/L      Potassium 4.4 mmol/L      Chloride 97 (L) mmol/L      CO2 40.0 (H) mmol/L      Calcium 8.8 mg/dL      Albumin 3.10 (L) g/dL      Phosphorus 3.5 mg/dL      Anion Gap 10.4 mmol/L      BUN/Creatinine Ratio 43.8 (H)     eGFR Non African Amer 70 mL/min/1.73     Narrative:       The MDRD GFR formula is only valid for adults with stable renal function between ages 18 and 70.    MRSA Screen Culture [[id number]]  (Normal) Collected:  03/21/17 2210    Specimen:  Swab from Nares Updated:  03/24/17 0855     MRSA SCREEN CX No Methicillin Resistant Staphylococcus aureus isolated    Acinetobacter Screen [[id number]]  (Normal) Collected:  03/21/17 2210    Specimen:  Swab from Nares Updated:  03/24/17 0855     ACINETOBACTER SCREEN CX No growth    VRE Culture [[id number]]  (Normal) Collected:  03/21/17 2210    Specimen:  Swab from Per Rectum Updated:  03/24/17 0856     VRE SCREEN CX No Vancomycin Resistant Enterococcus Isolated    POC Glucose Fingerstick [[id number]]  (Abnormal) Collected:  03/24/17 1037    Specimen:  Blood Updated:  03/24/17 1046     Glucose 194 (H) mg/dL       Serial Number: [id number]    : [id number]       POC Glucose Fingerstick [[id number]]  (Abnormal) Collected:  03/24/17 1522    Specimen:  Blood Updated:  03/24/17 1535     Glucose 230 (H) mg/dL       Serial Number: [id number]    : [id number]       POC Glucose Fingerstick [[id number]]  (Abnormal) Collected:  03/24/17 2141    Specimen:  Blood Updated:  03/24/17 2200     Glucose 268 (H) mg/dL       Serial Number: [id number]    : [id number]       POC Glucose Fingerstick [[id number]]  (Normal) Collected:  03/25/17 0620    Specimen:  Blood Updated:  03/25/17 0655     Glucose 109 mg/dL       Serial Number: [id number]    : [id number]       CBC (No Diff) [[id number]]   (Abnormal) Collected:  03/25/17 0550    Specimen:  Blood Updated:  03/25/17 0717     WBC 6.09 10*3/mm3      RBC 3.23 (L) 10*6/mm3      Hemoglobin 9.0 (L) g/dL      Hematocrit 29.7 (L) %      MCV 92.0 fL      MCH 27.9 pg      MCHC 30.3 g/dL      RDW 17.1 (H) %      RDW-SD 57.9 (H) fl      MPV 10.5 fL      Platelets 224 10*3/mm3     POC Glucose Fingerstick [[id number]]  (Abnormal) Collected:  03/25/17 1104    Specimen:  Blood Updated:  03/25/17 1116     Glucose 197 (H) mg/dL       Serial Number: [id number]    : [id number]       POC Glucose Fingerstick [[id number]]  (Abnormal) Collected:  03/25/17 1550    Specimen:  Blood Updated:  03/25/17 1600     Glucose 273 (H) mg/dL       Serial Number: [id number]    : [id number]       POC Glucose Fingerstick [[id number]]  (Abnormal) Collected:  03/25/17 2004    Specimen:  Blood Updated:  03/25/17 2021     Glucose 276 (H) mg/dL       Serial Number: [id number]    : [id number]       POC Glucose Fingerstick [[id number]]  (Abnormal) Collected:  03/26/17 0609    Specimen:  Blood Updated:  03/26/17 0625     Glucose 165 (H) mg/dL       Serial Number: [id number]    : [id number]       Basic Metabolic Panel [[id number]]  (Abnormal) Collected:  03/26/17 0929    Specimen:  Blood Updated:  03/26/17 1019     Glucose 163 (H) mg/dL      BUN 23 (H) mg/dL      Creatinine 0.60 mg/dL      Sodium 142 mmol/L      Potassium 4.1 mmol/L      Chloride 90 (L) mmol/L      CO2 45.0 (C) mmol/L      Calcium 8.8 mg/dL      eGFR Non African Amer 98 mL/min/1.73      BUN/Creatinine Ratio 38.3 (H)     Anion Gap 11.1 mmol/L     Narrative:       The MDRD GFR formula is only valid for adults with stable renal function between ages 18 and 70.    POC Glucose Fingerstick [[id number]]  (Abnormal) Collected:  03/26/17 1051    Specimen:  Blood Updated:  03/26/17 1100     Glucose 184 (H) mg/dL       Serial Number: [id number]    : [id number]       POC Glucose Fingerstick [[id number]]  (Abnormal) Collected:  03/26/17 1559     Specimen:  Blood Updated:  03/26/17 1605     Glucose 289 (H) mg/dL       Serial Number: [id number]    : [id number]       POC Glucose Fingerstick [[id number]]  (Abnormal) Collected:  03/26/17 2102    Specimen:  Blood Updated:  03/26/17 2125     Glucose 272 (H) mg/dL       Serial Number: [id number]    : [id number]       CBC (No Diff) [[id number]]  (Abnormal) Collected:  03/27/17 0517    Specimen:  Blood Updated:  03/27/17 0559     WBC 6.76 10*3/mm3      RBC 3.42 (L) 10*6/mm3      Hemoglobin 9.6 (L) g/dL      Hematocrit 31.8 (L) %      MCV 93.0 fL      MCH 28.1 pg      MCHC 30.2 g/dL      RDW 17.1 (H) %      RDW-SD 58.8 (H) fl      MPV 10.6 fL      Platelets 216 10*3/mm3     POC Glucose Fingerstick [[id number]]  (Abnormal) Collected:  03/27/17 0622    Specimen:  Blood Updated:  03/27/17 0647     Glucose 180 (H) mg/dL       Serial Number: [id number]    : [id number]       POC Glucose Fingerstick [[id number]]  (Abnormal) Collected:  03/27/17 1051    Specimen:  Blood Updated:  03/27/17 1055     Glucose 276 (H) mg/dL       Serial Number: [id number]    : [id number]       POC Glucose Fingerstick [[id number]]  (Abnormal) Collected:  03/27/17 1525    Specimen:  Blood Updated:  03/27/17 1540     Glucose 202 (H) mg/dL       Serial Number: [id number]    : [id number]       POC Glucose Fingerstick [[id number]]  (Abnormal) Collected:  03/27/17 1945    Specimen:  Blood Updated:  03/27/17 1955     Glucose 227 (H) mg/dL       Serial Number: [id number]    : [id number]       CBC (No Diff) [[id number]]  (Abnormal) Collected:  03/28/17 0529    Specimen:  Blood Updated:  03/28/17 0557     WBC 5.46 10*3/mm3      RBC 3.19 (L) 10*6/mm3      Hemoglobin 8.8 (L) g/dL      Hematocrit 29.1 (L) %      MCV 91.2 fL      MCH 27.6 pg      MCHC 30.2 g/dL      RDW 16.9 (H) %      RDW-SD 57.0 (H) fl      MPV 10.5 fL      Platelets 200 10*3/mm3     POC Glucose Fingerstick [[id number]]  (Normal) Collected:  03/28/17 0614    Specimen:  Blood  Updated:  03/28/17 0626     Glucose 74 mg/dL       Serial Number: [id number]    : [id number]       Basic Metabolic Panel [[id number]]  (Abnormal) Collected:  03/28/17 0529    Specimen:  Blood Updated:  03/28/17 0636     Glucose 81 mg/dL      BUN 26 (H) mg/dL      Creatinine 0.70 mg/dL      Sodium 140 mmol/L      Potassium 3.8 mmol/L      Chloride 91 (L) mmol/L      CO2 46.0 (C) mmol/L      Calcium 8.3 (L) mg/dL      eGFR Non African Amer 82 mL/min/1.73      BUN/Creatinine Ratio 37.1 (H)     Anion Gap 6.8 mmol/L     Narrative:       The MDRD GFR formula is only valid for adults with stable renal function between ages 18 and 70.    POC Glucose Fingerstick [[id number]]  (Normal) Collected:  03/28/17 0642    Specimen:  Blood Updated:  03/28/17 0650     Glucose 82 mg/dL       Serial Number: [id number]    : [id number]       POC Glucose Fingerstick [[id number]]  (Abnormal) Collected:  03/28/17 1046    Specimen:  Blood Updated:  03/28/17 1051     Glucose 264 (H) mg/dL       Serial Number: [id number]    : [id number]             Pertinent Radiology Results:    Imaging Results (all)     Procedure Component Value Units Date/Time    XR Chest 2 View [[id number]] Collected:  03/21/17 1803     Updated:  03/21/17 1804    Narrative:       FINAL REPORT    CLINICAL HISTORY:  SOB    FINDINGS:  Findings and impression: The lungs are well inflated. The heart size is mildly enlarged. A pacemaker is in place. There may be a small left pleural effusion. No pneumothorax.there may be early vascular congestion and increased interstitial markings   bilaterally. The findings suggest early pulmonary edema. No consolidation.      Impression:       Authenticated by Mor Yin MD on 03/21/2017 06:03:30 PM    XR Chest 1 View [[id number]] Collected:  03/24/17 0821     Updated:  03/24/17 0825    Narrative:       PROCEDURE: XR CHEST 1 VW-     HISTORY: SOA; I50.9-Heart failure, unspecified; R79.89-Other specified  abnormal findings of blood  chemistry     COMPARISON: March 21, 2017.     FINDINGS: A left subclavian ICD is in place. The heart is enlarged. The  mediastinum is unremarkable. There are increased interstitial markings  consistent with pulmonary edema. No significant effusions are evident.  There is no pneumothorax.  There are no acute osseous abnormalities.           Impression:       Cardiomegaly and interstitial pulmonary edema.     Continued followup is recommended.     This report was finalized on 3/24/2017 8:23 AM by Mercedes Zhou M.D..          Condition on Discharge:  Stable        Code status during the hospital stay: Full code       Discharge Disposition:    Rehab Facility or Unit (DC - External)    Discharge Medication:     Yudy Vásquez   Home Medication Instructions ELSY:[id number]    Printed on:03/28/17 1200   Medication Information                      acetaminophen (TYLENOL) 325 MG tablet  Take 2 tablets by mouth Every 6 (Six) Hours As Needed for Mild Pain (1-3).             ALPRAZolam (XANAX) 0.5 MG tablet  Take 1 tablet by mouth 3 (Three) Times a Day As Needed (panic attack) for up to 9 doses.             ammonium lactate (LAC-HYDRIN) 12 % lotion  Apply  topically 2 (Two) Times a Day As Needed for dry skin. Apply liberally to affected area BID. Refill PRN up to 3 bottles/mo for 12 mo             atorvastatin (LIPITOR) 20 MG tablet  Take 1 tablet by mouth Every Night.             bisoprolol (ZEBeta) 10 MG tablet  Take 1 tablet by mouth Daily.             budesonide-formoterol (SYMBICORT) 160-4.5 MCG/ACT inhaler  Inhale 2 puffs 2 (Two) Times a Day. Rinse mouth with water after use.             bumetanide (BUMEX) 1 MG tablet  Take 0.5 tablets by mouth Daily As Needed (for weight gain of 3 or more lbs  or edema or shortness of air, in addition to lasix).             buPROPion XL (WELLBUTRIN XL) 150 MG 24 hr tablet  Take 1 tablet by mouth Every Morning.             clopidogrel (PLAVIX) 75 MG tablet  TAKE ONE TABLET BY  MOUTH DAILY             escitalopram (LEXAPRO) 10 MG tablet  Take 1 tablet by mouth Daily.             furosemide (LASIX) 40 MG tablet  Take 1 tablet by mouth 2 (Two) Times a Day.             hydrocortisone 2.5 % cream  Apply  topically 2 (Two) Times a Day for 14 days. Apply to clean dry skin before other lotions. Do not use on face.             hydrOXYzine (VISTARIL) 25 MG capsule  Take 25 mg by mouth 3 (Three) Times a Day As Needed for Itching.             insulin aspart (novoLOG) 100 UNIT/ML injection  Inject 4-10 Units under the skin 4 (Four) Times a Day Before Meals & at Bedtime. Per sliding scale             insulin detemir (LEVEMIR) 100 UNIT/ML injection  Inject 40 Units under the skin Every Night.             ipratropium-albuterol (COMBIVENT RESPIMAT)  MCG/ACT inhaler  Inhale 1 puff 4 (Four) Times a Day As Needed for shortness of air.             levothyroxine (SYNTHROID, LEVOTHROID) 125 MCG tablet  TAKE ONE TABLET BY MOUTH DAILY             lidocaine (LIDODERM) 5 %  Place 1 patch on the skin every day.             melatonin 3 MG tablet  Take 1 tablet by mouth At Night As Needed for sleep.             metoclopramide (REGLAN) 5 MG tablet  Take 2.5 mg by mouth 2 (Two) Times a Day.             mirtazapine (REMERON) 45 MG tablet  Take 1 tablet by mouth Every Night.             pantoprazole (PROTONIX) 40 MG EC tablet  Take 1 tablet by mouth 2 (Two) Times a Day.             polyethylene glycol (MIRALAX) packet  Take 17 g by mouth Daily As Needed (constipation).             ranitidine (ZANTAC) 150 MG capsule  Take 1 capsule by mouth 2 (Two) Times a Day As Needed for indigestion or heartburn.             rOPINIRole (REQUIP) 1 MG tablet  Take 1 tablet by mouth Every Night. Take 1 hour before bedtime.             sacubitril-valsartan (ENTRESTO) 24-26 MG tablet  Take 1 tablet by mouth Every 12 (Twelve) Hours.             sodium chloride (OCEAN) 0.65 % nasal spray  2 sprays into each nostril As Needed for  Congestion.             spironolactone (ALDACTONE) 25 MG tablet  Take 1 tablet by mouth Every Other Day.             Sunscreens (CHAPSTICK) stick  Apply 1 each topically 4 (Four) Times a Day As Needed (chapped lips).             tiotropium (SPIRIVA HANDIHALER) 18 MCG per inhalation capsule  Place 2 puffs into inhaler and inhale Daily.                 Discharge Diet:     Diet Instructions     Diet: Cardiac, Consistent Carbohydrate; Thin Liquids, No Restrictions       Discharge Diet:   Cardiac  Consistent Carbohydrate      Fluid Consistency:  Thin Liquids, No Restrictions                 Activity at Discharge:     Activity Instructions     Activity as Tolerated                     Follow-up Appointments:    Future Appointments  Date Time Provider Department Center   4/24/2017 1:15 PM Nicolás Mendoza MD [facility] None     Additional Instructions for the Follow-ups that You Need to Schedule     Discharge Follow-up with PCP    As directed    Follow Up Details:  2 day with facility physician       Discharge Follow-up with Specialty    As directed    Specialty:  Dr Tucker follow up CHF exacerbation   Follow Up:  1 Week                 Test Results Pending at Discharge: None           Mariya Guzman DO  03/28/17  12:00 PM    Time:  I spent 35 minutes preparing discharge counseling and teaching. Reviewed treatment plan with patient and daughter who reported being satisfied with current care plan.

## 2017-03-28 NOTE — PLAN OF CARE
Problem: Patient Care Overview (Adult)  Goal: Plan of Care Review  Outcome: Ongoing (interventions implemented as appropriate)    03/28/17 0335   Coping/Psychosocial Response Interventions   Plan Of Care Reviewed With patient   Patient Care Overview   Progress improving   Outcome Evaluation   Outcome Summary/Follow up Plan BOOTHE, edema to BLE's, SR on tele monitor, less anxiety so far tonight       Goal: Adult Individualization and Mutuality  Outcome: Ongoing (interventions implemented as appropriate)  Goal: Discharge Needs Assessment  Outcome: Ongoing (interventions implemented as appropriate)    Problem: Fluid Volume Excess (Adult,Obstetrics,Pediatric)  Goal: Identify Related Risk Factors and Signs and Symptoms  Outcome: Ongoing (interventions implemented as appropriate)  Goal: Stable Weight  Outcome: Ongoing (interventions implemented as appropriate)  Goal: Balanced Intake/Output  Outcome: Ongoing (interventions implemented as appropriate)    Problem: Cardiac: Heart Failure (Adult)  Goal: Signs and Symptoms of Listed Potential Problems Will be Absent or Manageable (Cardiac: Heart Failure)  Outcome: Ongoing (interventions implemented as appropriate)    Problem: Fall Risk (Adult)  Goal: Identify Related Risk Factors and Signs and Symptoms  Outcome: Ongoing (interventions implemented as appropriate)  Goal: Absence of Falls  Outcome: Ongoing (interventions implemented as appropriate)

## 2017-03-28 NOTE — PLAN OF CARE
Problem: Fluid Volume Excess (Adult,Obstetrics,Pediatric)  Goal: Stable Weight  Outcome: Ongoing (interventions implemented as appropriate)    03/27/17 0341   Fluid Volume Excess (Adult,Obstetrics,Pediatric)   Stable Weight making progress toward outcome       Goal: Balanced Intake/Output  Outcome: Ongoing (interventions implemented as appropriate)    03/27/17 0341   Fluid Volume Excess (Adult,Obstetrics,Pediatric)   Balanced Intake/Output making progress toward outcome         Problem: Fall Risk (Adult)  Goal: Identify Related Risk Factors and Signs and Symptoms  Outcome: Ongoing (interventions implemented as appropriate)    03/27/17 0341   Fall Risk   Fall Risk: Related Risk Factors age-related changes;history of falls   Fall Risk: Signs and Symptoms presence of risk factors       Goal: Absence of Falls  Outcome: Ongoing (interventions implemented as appropriate)    03/27/17 0341   Fall Risk (Adult)   Absence of Falls making progress toward outcome

## 2017-04-02 NOTE — ED PROVIDER NOTES
Subjective   History of Present Illness  TRIAGE CHIEF COMPLAINT:   Chief Complaint   Patient presents with   • Fall   • Head Injury         HPI: Yudy Vásquez   is a 72 y.o. female   who presents to the emergency department for evaluation of head injury.  Apparently patient was sitting in a chair and fell asleep causing her to fall forwards and strike the ground with her forehead.  No report of loss of consciousness.  Sent by her nursing home for evaluation of head injury.  Patient currently only has complaint of mild frontal headache.  Daughter is concerned because she takes Plavix.  Patient also reports diffuse whole-body itchiness which has been an ongoing issue and is being treated by her PCP with Atarax.  Daughter believes part of this may be due to dry skin as patient has not been applying lotion and also has not showered in roughly 5 days.           Review of Systems   All other systems reviewed and are negative.      Past Medical History:   Diagnosis Date   • Anemia    • Anxiety    • Arthritis    • Bacterial sinusitis    • Chronic back pain    • Chronic coronary artery disease 5/12/2016 1996 PTCA of RCA.  2004 PTCA cutting balloon and Express stenting of RCA.  October 2007, Taxus YANDY to proximal and mid-RCA per LCB.  09/10/2008 [facility], nonobstructive plaque. EF 55%.  December 2014, echocardiogram [facility], EF 35% with RVSP of 56 and aortic sclerosis.     • Chronic obstructive lung disease    • Colon cancer screening    • Colon polyps      · Last Impression: 09 Dec 2015  History of multiple colon polyps including tubularadenomata.  Maximino Fields (Gastroenterology)   • Constipation    • Depression    • Depression    • Diarrhea    • Disease of thyroid gland    • Dysphagia 01/20/2013   • Esophageal reflux    • Esophagitis, reflux    • Hernia    • History of bronchitis    • History of cardiac pacemaker    • History of degenerative disc disease    • History of tobacco abuse    • History of  urinary tract infection    • Hyperlipidemia    • Hypothyroidism    • Myocardial infarction    • Nausea    • Occult blood in stools     Impression: 12/09/2015 - This could be multifactorial including secondary to small nasal bleed.  However the small nasal bleed does not explain anemia as such.  The patient previously had undergone a colonoscopy in February 2014.  However unfortunately the prep was considered to be suboptimal.;    • Pancreatitis     status post ERCP for common duct stone in 2001   • Renal disorder    • RLS (restless legs syndrome)        Allergies   Allergen Reactions   • Amphetamine-Dextroamphetamine    • Lorazepam    • Other      No Known Food Allergy       Past Surgical History:   Procedure Laterality Date   • BACK SURGERY  1992   • CARDIAC CATHETERIZATION     • CARDIAC PACEMAKER PLACEMENT     • CHOLECYSTECTOMY  1968   • COLONOSCOPY  2009    Fiberoptic (Onset Date: 2009)   • CORONARY STENT PLACEMENT  1994   • HYSTERECTOMY  1998   • INTERNAL CARDIAC DEFIBRILLATOR INSERTION      Dual Lead Cardioverter-Defibrilator   • PACEMAKER IMPLANTATION  1994   • TONSILLECTOMY  1955       Family History   Problem Relation Age of Onset   • Dementia Mother    • Pneumonia Mother    • COPD Father    • Emphysema Father    • Diabetes Father    • Lung cancer Sister    • Cancer Sister    • COPD Sister    • Hyperlipidemia Sister    • Diabetes Sister    • Hyperlipidemia Brother    • Diabetes Brother        Social History     Social History   • Marital status:      Spouse name: N/A   • Number of children: N/A   • Years of education: N/A     Social History Main Topics   • Smoking status: Former Smoker   • Smokeless tobacco: Never Used   • Alcohol use No   • Drug use: No   • Sexual activity: No     Other Topics Concern   • None     Social History Narrative           Objective   Physical Exam    CONSTITUTIONAL: Awake, interactive, appears non-toxic   HENT: Small contusion with mild swelling over the left forehead,  normocephalic, oral mucosa pink and moist, airway patent. Nares patent without drainage. External ears normal.   EYES: Conjunctiva clear, EOMI, PERRL   NECK: Trachea midline, non-tender, supple   CARDIOVASCULAR: Normal heart rate, Normal rhythm, No murmurs, rubs, gallops   PULMONARY/CHEST: Mild rales at bilateral bases, no wheezing or rhonchi. Symmetrical breath sounds. Non-tender.   ABDOMINAL: Non-distended, soft, non-tender - no rebound or guarding. BS normal.   NEUROLOGIC: Non-focal, moving all four extremities, no gross sensory or motor deficits.   EXTREMITIES: No clubbing, cyanosis, or edema   SKIN: Warm, Dry, No erythema, No rash     CT Chest Without Contrast   Final Result   Mild pulmonary opacities, favor atelectasis or scar.      Borderline mediastinal lymph nodes.      Anasarca and mild ascites.      Authenticated by Rehan Ross III, MD on 04/02/2017   01:26:31 PM      CT Head Without Contrast   Final Result   Ventriculomegaly greater than to be expected worrisome for mild hydrocephalus.      Otherwise, no acute intracranial abnormality.      Authenticated by Rehan Ross III, MD on 04/02/2017   01:24:37 PM              EKG:  NSR rate 62 no acute ST changes       Procedures         ED Course  ED Course          ED COURSE / MEDICAL DECISION MAKING:   No evidence of intracranial injury, rib fracture, pneumonia, metabolic derangement.  Chronic lab abnormalities.  Discussed CT head findings with the daughter.  Plan for close outpatient follow-up versus return if worse.    DECISION TO DISCHARGE/ADMIT: see ED care timeline       Electronically signed by: Yasmany Gil MD, 4/2/2017 2:09 PM              [facility]    Final diagnoses:   Head injury, initial encounter            Yasmany Gil MD  04/02/17 1419

## 2017-04-08 NOTE — DISCHARGE INSTRUCTIONS
Do not get out of bed or walk around without assistance at the nursing home.  Return to the ER for passing out, chest pain, severe headache, vomiting, new or worsening symptoms.  Follow-up with your doctor in 1-2 days.

## 2017-04-08 NOTE — ED PROVIDER NOTES
Subjective   HPI Comments: 72-year-old female with a history of frequent falls.  She is a poor historian related to dementia.  She is from the nursing home.  Is not supposed to stand up on her own.  Apparently, tried to stand up on her own today and fell hitting her head on the ground.  I am unable to discern whether or not there was loss of consciousness.  No vomiting.  No neck pain.  No other injuries.  Patient denies syncope.  She is on Plavix as her only blood thinner.  No apparent other symptoms, according to the EMS and nursing home records.  Her O2 saturation was 90% on triage but 94% on room air when I was in the room.    Aggravating factors: Standing up.  Alleviating factors: None.  Treatment prior to arrival: EMS    Apparently protocol labs were ordered on the patient and she refused to have blood drawn.      History provided by:  Patient (nh records)  History limited by:  Dementia   used: No        Review of Systems   Constitutional: Negative.    Eyes: Negative.    Respiratory: Negative.    Cardiovascular: Negative.  Negative for chest pain.   Gastrointestinal: Negative.  Negative for abdominal pain.   Endocrine: Negative.    Genitourinary: Negative for dysuria.   Musculoskeletal: Negative.    Skin: Negative.    Allergic/Immunologic: Negative.    Neurological: Positive for headaches.   Hematological: Negative.    Psychiatric/Behavioral: Negative.    All other systems reviewed and are negative.      Past Medical History:   Diagnosis Date   • Anemia    • Anxiety    • Arthritis    • Bacterial sinusitis    • Chronic back pain    • Chronic coronary artery disease 5/12/2016 1996 PTCA of RCA.  2004 PTCA cutting balloon and Express stenting of RCA.  October 2007, Taxus YANDY to proximal and mid-RCA per LCB.  09/10/2008 [facility], nonobstructive plaque. EF 55%.  December 2014, echocardiogram [facility], EF 35% with RVSP of 56 and aortic sclerosis.     • Chronic obstructive lung disease     • Colon cancer screening    • Colon polyps      · Last Impression: 09 Dec 2015  History of multiple colon polyps including tubularadenomata.  Maximino Fields (Gastroenterology)   • Constipation    • Depression    • Depression    • Diarrhea    • Disease of thyroid gland    • Dysphagia 01/20/2013   • Esophageal reflux    • Esophagitis, reflux    • Hernia    • History of bronchitis    • History of cardiac pacemaker    • History of degenerative disc disease    • History of tobacco abuse    • History of urinary tract infection    • Hyperlipidemia    • Hypothyroidism    • Myocardial infarction    • Nausea    • Occult blood in stools     Impression: 12/09/2015 - This could be multifactorial including secondary to small nasal bleed.  However the small nasal bleed does not explain anemia as such.  The patient previously had undergone a colonoscopy in February 2014.  However unfortunately the prep was considered to be suboptimal.;    • Pancreatitis     status post ERCP for common duct stone in 2001   • Renal disorder    • RLS (restless legs syndrome)        Allergies   Allergen Reactions   • Amphetamine-Dextroamphetamine    • Lorazepam    • Other      No Known Food Allergy       Past Surgical History:   Procedure Laterality Date   • BACK SURGERY  1992   • CARDIAC CATHETERIZATION     • CARDIAC PACEMAKER PLACEMENT     • CHOLECYSTECTOMY  1968   • COLONOSCOPY  2009    Fiberoptic (Onset Date: 2009)   • CORONARY STENT PLACEMENT  1994   • HYSTERECTOMY  1998   • INTERNAL CARDIAC DEFIBRILLATOR INSERTION      Dual Lead Cardioverter-Defibrilator   • PACEMAKER IMPLANTATION  1994   • TONSILLECTOMY  1955       Family History   Problem Relation Age of Onset   • Dementia Mother    • Pneumonia Mother    • COPD Father    • Emphysema Father    • Diabetes Father    • Lung cancer Sister    • Cancer Sister    • COPD Sister    • Hyperlipidemia Sister    • Diabetes Sister    • Hyperlipidemia Brother    • Diabetes Brother        Social History      Social History   • Marital status:      Spouse name: N/A   • Number of children: N/A   • Years of education: N/A     Social History Main Topics   • Smoking status: Former Smoker   • Smokeless tobacco: Never Used   • Alcohol use No   • Drug use: No   • Sexual activity: No     Other Topics Concern   • None     Social History Narrative           Objective   Physical Exam   Constitutional: She appears well-developed and well-nourished. No distress.   HENT:   Head: Normocephalic and atraumatic.   Right Ear: External ear normal.   Left Ear: External ear normal.   No signs of head trauma noted.   Eyes: EOM are normal. Pupils are equal, round, and reactive to light.   Neck: Normal range of motion. Neck supple.   Nontender in the midline.   Cardiovascular: Normal rate, regular rhythm and normal heart sounds.    Pulmonary/Chest: Effort normal and breath sounds normal. No stridor. She has no wheezes. She exhibits no tenderness.   Abdominal: Soft. She exhibits no distension. There is no tenderness. There is no rebound and no guarding.   Musculoskeletal: Normal range of motion. She exhibits no edema, tenderness or deformity.   Her back is nontender in the midline.   Neurological: She is alert.   The patient is demented and pleasantly confused.   Skin: Skin is warm and dry. No rash noted. She is not diaphoretic.   Psychiatric: She has a normal mood and affect. Her behavior is normal.   Nursing note and vitals reviewed.      ECG 12 Lead    Date/Time: 4/8/2017 4:12 PM  Performed by: SYED MCCARTHY  Authorized by: ARIN ROSADO   Comments: EKG: Sinus bradycardia rate of 61.  Left axis deviation.  Poor anterior R-wave progression.  Nonspecific ST and T-wave changes.  No ectopy.               ED Course  ED Course   Comment By Time   Patient absolutely refuses laboratory evaluation other than the urinalysis.  Refuses an IV and blood work.  I explained to her that we could be missing something life-threatening that  could cause her death.  She verbalized understanding, though she is demented and it is difficult to tell if she understands what I am telling her. Shannon Hansen PA-C 04/08 1608   This sounds like a mechanical fall.  No syncope.  I explained to the patient that we still needed lab evaluation.  She refused. Shannon Hansen PA-C 04/08 1613                  MDM  Number of Diagnoses or Management Options  Accidental fall, initial encounter: new and requires workup  Head injury, initial encounter: new and requires workup  Urinary tract infection, site unspecified: new and requires workup  Diagnosis management comments: As mentioned in my dictation above, the patient appears to have had a mechanical fall.  No syncope.  Negative CT head and C-spine.  Laboratory evaluation concerning for urinary tract infection.  will treat.  I explained to the patient to not get out of bed on her own without assistance.  She verbalized understanding though she is demented and comprehension is questionable.  The nursing home will be given report as to not let the patient get up on her own.       Amount and/or Complexity of Data Reviewed  Clinical lab tests: reviewed and ordered  Tests in the radiology section of CPT®: ordered and reviewed  Tests in the medicine section of CPT®: ordered and reviewed  Discuss the patient with other providers: yes  Independent visualization of images, tracings, or specimens: yes    Risk of Complications, Morbidity, and/or Mortality  Presenting problems: moderate  Diagnostic procedures: low  Management options: moderate    Patient Progress  Patient progress: stable      Final diagnoses:   Accidental fall, initial encounter   Head injury, initial encounter   Urinary tract infection, site unspecified            Shannon Hansen PA-C  04/08/17 8828

## 2017-04-10 NOTE — ED PROVIDER NOTES
Subjective   HPI Comments: 72-year-old female with a history of dementia who is in the nursing home.  Seen here by myself yesterday for a probable mechanical fall after standing up out of bed on her own and hitting her head.  Negative CT scan of the head and neck yesterday.  Sent home with antibiotics(nitrofurantion) for urinary tract infection.  Apparently yesterday she refused blood work.  She is now here for confusion, combativeness, disorientation and not wanting to cooperate with the nursing staff at the nursing home for the past one week.  She denies any physical complaints currently to myself other than some leg edema which worsened today after sitting on the toilet for one to one and a half hours.  She is no more short of breath than usual.  She wears 3 L of O2 at home.  Reportedly has been coughing up some yellow sputum as well.  The patient is not happy with her situation at the nursing home and wants to go home with her son.  Her son also wants to take her home.    Patient is a 72 y.o. female presenting with altered mental status.   History provided by:  Patient and nursing home  History limited by:  Dementia   used: No    Altered Mental Status   Presenting symptoms: behavior changes, combativeness and confusion    Severity:  Moderate  Most recent episode:  Yesterday  Episode history:  Multiple  Duration:  2 days  Timing:  Constant  Progression:  Worsening  Chronicity:  New  Context: dementia and recent infection    Associated symptoms: agitation    Associated symptoms: no abdominal pain        Review of Systems   Constitutional: Negative.    HENT: Negative.    Eyes: Negative.    Respiratory: Negative.    Cardiovascular: Negative.  Negative for chest pain.   Gastrointestinal: Negative.  Negative for abdominal pain.   Endocrine: Negative.    Genitourinary: Negative for dysuria.   Musculoskeletal: Negative.    Skin: Negative.    Allergic/Immunologic: Negative.    Neurological: Negative.     Hematological: Negative.    Psychiatric/Behavioral: Positive for agitation and confusion. The patient is nervous/anxious.    All other systems reviewed and are negative.      Past Medical History:   Diagnosis Date   • Anemia    • Anxiety    • Arthritis    • Bacterial sinusitis    • Chronic back pain    • Chronic coronary artery disease 5/12/2016 1996 PTCA of RCA.  2004 PTCA cutting balloon and Express stenting of RCA.  October 2007, Taxus YANDY to proximal and mid-RCA per LCB.  09/10/2008 LHC, nonobstructive plaque. EF 55%.  December 2014, echocardiogram [facility], EF 35% with RVSP of 56 and aortic sclerosis.     • Chronic obstructive lung disease    • Colon cancer screening    • Colon polyps      · Last Impression: 09 Dec 2015  History of multiple colon polyps including tubularadenomata.  Maximino Fields (Gastroenterology)   • Constipation    • Depression    • Depression    • Diarrhea    • Disease of thyroid gland    • Dysphagia 01/20/2013   • Esophageal reflux    • Esophagitis, reflux    • Hernia    • History of bronchitis    • History of cardiac pacemaker    • History of degenerative disc disease    • History of tobacco abuse    • History of urinary tract infection    • Hyperlipidemia    • Hypothyroidism    • Myocardial infarction    • Nausea    • Occult blood in stools     Impression: 12/09/2015 - This could be multifactorial including secondary to small nasal bleed.  However the small nasal bleed does not explain anemia as such.  The patient previously had undergone a colonoscopy in February 2014.  However unfortunately the prep was considered to be suboptimal.;    • Pancreatitis     status post ERCP for common duct stone in 2001   • Renal disorder    • RLS (restless legs syndrome)        Allergies   Allergen Reactions   • Amphetamine-Dextroamphetamine    • Lorazepam    • Other      No Known Food Allergy       Past Surgical History:   Procedure Laterality Date   • BACK SURGERY  1992   • CARDIAC  CATHETERIZATION     • CARDIAC PACEMAKER PLACEMENT     • CHOLECYSTECTOMY  1968   • COLONOSCOPY  2009    Fiberoptic (Onset Date: 2009)   • CORONARY STENT PLACEMENT  1994   • HYSTERECTOMY  1998   • INTERNAL CARDIAC DEFIBRILLATOR INSERTION      Dual Lead Cardioverter-Defibrilator   • PACEMAKER IMPLANTATION  1994   • TONSILLECTOMY  1955       Family History   Problem Relation Age of Onset   • Dementia Mother    • Pneumonia Mother    • COPD Father    • Emphysema Father    • Diabetes Father    • Lung cancer Sister    • Cancer Sister    • COPD Sister    • Hyperlipidemia Sister    • Diabetes Sister    • Hyperlipidemia Brother    • Diabetes Brother        Social History     Social History   • Marital status:      Spouse name: N/A   • Number of children: N/A   • Years of education: N/A     Social History Main Topics   • Smoking status: Former Smoker   • Smokeless tobacco: Never Used   • Alcohol use No   • Drug use: No   • Sexual activity: No     Other Topics Concern   • None     Social History Narrative           Objective   Physical Exam   Constitutional: She is oriented to person, place, and time. She appears well-developed and well-nourished. No distress.   HENT:   Head: Normocephalic.   Right Ear: External ear normal.   Left Ear: External ear normal.   Bruising is noted to the top of her forehead.   Eyes: EOM are normal. Pupils are equal, round, and reactive to light.   Neck: Normal range of motion. Neck supple.   Cardiovascular: Normal rate, regular rhythm and normal heart sounds.    Pulmonary/Chest: Effort normal. No stridor. She has no wheezes. She exhibits no tenderness.   A few rhonchi noted in both lung bases.  Clears with coughing.   Abdominal: Soft. She exhibits no distension. There is no tenderness. There is no rebound and no guarding.   Musculoskeletal: Normal range of motion. Edema: mild pitting edema to both legs.   Neurological: She is alert and oriented to person, place, and time. No cranial nerve  deficit. She exhibits normal muscle tone. Coordination normal.   Skin: Skin is warm and dry. No rash noted. She is not diaphoretic.   Psychiatric: She has a normal mood and affect. Her behavior is normal. Judgment and thought content normal.   Nursing note and vitals reviewed.      ECG 12 Lead    Date/Time: 4/9/2017 10:45 PM  Performed by: SYED MCCARTHY  Authorized by: SYED MCCARTHY   Comments: EKG: Sinus bradycardia rate of 61.  Left axis deviation.  Poor anterior R-wave progression.  No ectopy.               ED Course  ED Course   Comment By Time   Chest x-ray: Mild cardiomegaly and minimal vascular congestion. Defibrillator noted.  No effusions.  No infiltrates. Syed Mccarthy PA-C 04/09 5683   I am told by the charge nurse that the patient is to be cleared by the nursing home physician to be taken home.  Her son is not happy with this.  Dr. Campoverde aware of the situation.  We will send her back to the nursing home and have her nursing home doctor clear her. Syed Mccarthy PA-C 04/09 2737   The patient's son is wanting to take her home against medical advise given our discussion.  Charge nurse involved with the case.  We will write her another prescription for Macrobid medication in case she goes home. Syed Mccarthy PA-C 04/09 0462                  MDM  Number of Diagnoses or Management Options  Disorientation, unspecified: new and requires workup  Peripheral edema: new and requires workup     Amount and/or Complexity of Data Reviewed  Clinical lab tests: reviewed and ordered  Tests in the radiology section of CPT®: reviewed and ordered  Tests in the medicine section of CPT®: ordered and reviewed  Decide to obtain previous medical records or to obtain history from someone other than the patient: yes  Discuss the patient with other providers: yes    Risk of Complications, Morbidity, and/or Mortality  Presenting problems: low  Diagnostic procedures: minimal  Management options: low    Patient Progress  Patient  progress: stable      Final diagnoses:   Disorientation, unspecified   Peripheral edema            Shannon Hansen PA-C  04/09/17 5297       Shannon Hansen PA-C  04/09/17 4873

## 2017-04-10 NOTE — ED NOTES
I spoke with staff at [facility] regarding sons interesting in signing pt out AMA from nursing home. They stated they spoke with their DON and pt's son needs to come by and sign AMA paperwork. PT's son made aware.      Veronique Ziegler, RN  04/09/17 7428

## 2017-04-10 NOTE — DISCHARGE INSTRUCTIONS
The nursing home physician will have to clear you to go home.    Take the Macrobid for the urinary tract infection as directed.

## 2017-04-11 NOTE — PROGRESS NOTES
Yudy Vásquez is a 72 y.o. female.     Subjective   History of Present Illness   Here today for follow up from several recent ER visits in the last week as well as left AMA from her rehab/nursing home 2 days ago. Was initially sent to the rehab facility due to an accidental fall in her home where she resides by herself.  Has been having cognitive impairment intermittently in the last few weeks including trouble remembering where she is.  In the last month intermittently getting very itchy which seems to lead to disorientation when she becomes fixated on the itching.      Began Macrobid for UTI yesterday and last night finally got some rest and cognition seems to have improved somewhat. Admits to dysuria and frequency but unsure how long.  Reports feeling off balance more than usual in the last few weeks. Her son reports that other than in the last week he has never seen her disoriented unless triggered by low blood sugar, pain medication or other external trigger. He does reports that her mother suffered from dementia of some form.     Reports recent worsening of pitting edema which improved back to baseline again in the last 1-2 days. Chronic SOA but no worse. Denies orthopnea. Unsure if she takes Lasix daily as a home medication. Takes Spironolactone daily.         The following portions of the patient's history were reviewed and updated as appropriate: allergies, current medications, past family history, past medical history, past social history, past surgical history and problem list.    Review of Systems    Constitutional: Negative for appetite change, chills, fatigue, fever and unexpected weight change.   HENT: Negative for congestion, ear pain, hearing loss, nosebleeds, postnasal drip, rhinorrhea, sore throat, tinnitus and trouble swallowing.    Eyes: Negative for photophobia, discharge and visual disturbance.   Respiratory: SOA.  Negative for cough, chest tightness and wheezing.    Cardiovascular: lower  leg edema. Negative for chest pain, palpitations.  Gastrointestinal: Negative for abdominal distention, abdominal pain, blood in stool, constipation, diarrhea, nausea and vomiting.   Endocrine: Negative for cold intolerance, heat intolerance, polydipsia, polyphagia and polyuria.   Musculoskeletal: Negative for arthralgias, back pain, joint swelling, myalgias, neck pain and neck stiffness.   Skin: bruising, itching. Negative for pallor, rash and wound.   Allergic/Immunologic: Negative for environmental allergies, food allergies and immunocompromised state.   Neurological: confusion, balance problems. Negative for tremors, seizures, weakness, numbness and headaches.   Hematological: Negative for adenopathy. Does not bruise/bleed easily.   Psychiatric/Behavioral: Negative for sleep disturbances, agitation, behavioral problems, confusion, hallucinations, self-injury and suicidal ideas. The patient is not nervous/anxious.    : urinary frequency, dysuria.     Objective    Physical Exam  Constitutional: Oriented to person, place, and time. Appears well-developed and well-nourished.   HENT:   Head: Normocephalic and atraumatic.   Eyes: EOM are normal. Pupils are equal, round, and reactive to light.   Neck: Normal range of motion. Neck supple.   Cardiovascular: 2+ pitting edema bilateral lower legs. Normal rate, regular rhythm and normal heart sounds.    Pulmonary/Chest: 3L O2 via nasal cannula. Effort normal and breath sounds normal. No respiratory distress.  Has no wheezes or rales. Exhibits no chest wall tenderness.   Abdominal: Soft. Bowel sounds are normal. Exhibits no distension and no mass. There is no tenderness.   Musculoskeletal: Normal range of motion. Exhibits no tenderness.   Neurological: Alert and oriented to person, place, and time.   Skin: bruising of forehead near hairline.  Skin is warm and dry.   Psychiatric: Has a normal mood and affect. Behavior is normal. Judgment and thought content normal.       BP  "124/58  Pulse 62  Temp 98 °F (36.7 °C)  Ht 65\" (165.1 cm)  Wt 186 lb (84.4 kg)  SpO2 97%  BMI 30.95 kg/m2    Nursing note and vitals reviewed.        Assessment/Plan   Yudy was seen today for follow-up.    Diagnoses and all orders for this visit:    Urinary tract infection without hematuria, site unspecified  UA showed possible UTI which is being treated with Macrobid since yesterday. Clinically positive for UTI with unknown onset.  Continue Macrobid. F/u 1 week for repeat UA.     Recent head injury, subsequent encounter  Frequent falls  Confusion and disorientation  Recent accidental fall with bruising of forehead. 3 or 4 falls in the last 4 weeks which is abnormal for her. Possible connection to UTI but unsure at this time.   Diffuse brain atrophy  CT head reveals diffuse atrophy. Recent confusion and disorientation. Has appointment with neurologist today (Dr. Ralph).    Itching  -     hydrOXYzine (VISTARIL) 25 MG capsule; Take 1 capsule by mouth 3 (Three) Times a Day As Needed for Itching or Anxiety.    Acute on chronic congestive heart failure, unspecified congestive heart failure type  -     furosemide (LASIX) 40 MG tablet; Take 1 tablet by mouth 2 (Two) Times a Day As Needed (lower leg swelling).  Change from 40 mg bid to 40 mg bid prn based on extent of edema.   Wear SHIRLENE hose at least 6 hours per day. Elevate legs above heart 30 minutes after putting SHIRLENE hose on.     ER records reviewed from 4/2, 4/8, 4/9.     Follow up in 1 week for recheck of UTI and repeat labs. Finish Macrobid. F/u sooner if problems arise.          "

## 2017-04-11 NOTE — PROGRESS NOTES
[facility] NEUROLOGY [place] CONSULTATION   History of Present Illness     Date: 4/11/2017    Patient Identification  Yudy Vásquez is a 72 y.o. female.    Patient information was obtained from patient.  History/Exam limitations: none.    CONSULTATION requested by: No ref. provider found      Chief Complaint   Head Injury (NP/ Pt had fallen asleep in a chair and fell forward out of her chair and hit her forehead. Pt went to ER but left AMA. Pt then fell again on 04/08/17 and went back to the ER. Pt had CT at both visit)      Insomnia   This is a chronic problem. The current episode started more than 1 month ago. The problem occurs daily. The problem has been gradually worsening. Associated symptoms include fatigue and weakness. Pertinent negatives include no abdominal pain, anorexia, arthralgias, change in bowel habit, chest pain, chills, congestion, coughing, diaphoresis, fever, headaches, joint swelling, myalgias, nausea, neck pain, numbness, rash, sore throat, swollen glands, urinary symptoms, vertigo, visual change or vomiting. The symptoms are aggravated by standing. Treatments tried: Requip 1 mg at bedtime. The treatment provided no relief.      Patient has been complaining having creepy crawly sensation in her leg worse is when she is at rest only improved when she is moving.   But unfortunately, because of her weakness she often fall.  She was given Requip with her restless leg syndrome and she was to to take it at bedtime. She is complaining of having daytime sleepiness.  Patient did not feel significant improvement.  Patient also reports to have RLS symptoms in the daytime.      PMH:   Past Medical History:   Diagnosis Date   • Anemia    • Anxiety    • Arthritis    • Bacterial sinusitis    • Chronic back pain    • Chronic coronary artery disease 5/12/2016 1996 PTCA of RCA.  2004 PTCA cutting balloon and Express stenting of RCA.  October 2007, Taxus YANDY to proximal and mid-RCA per LCB.   09/10/2008 LHC, nonobstructive plaque. EF 55%.  December 2014, echocardiogram [facility], EF 35% with RVSP of 56 and aortic sclerosis.     • Chronic obstructive lung disease    • Colon cancer screening    • Colon polyps      · Last Impression: 09 Dec 2015  History of multiple colon polyps including tubularadenomata.  Maximino Fields (Gastroenterology)   • Constipation    • Depression    • Depression    • Diarrhea    • Disease of thyroid gland    • Dysphagia 01/20/2013   • Esophageal reflux    • Esophagitis, reflux    • Hernia    • History of bronchitis    • History of cardiac pacemaker    • History of degenerative disc disease    • History of tobacco abuse    • History of urinary tract infection    • Hyperlipidemia    • Hypothyroidism    • Myocardial infarction    • Nausea    • Occult blood in stools     Impression: 12/09/2015 - This could be multifactorial including secondary to small nasal bleed.  However the small nasal bleed does not explain anemia as such.  The patient previously had undergone a colonoscopy in February 2014.  However unfortunately the prep was considered to be suboptimal.;    • Pancreatitis     status post ERCP for common duct stone in 2001   • Renal disorder    • RLS (restless legs syndrome)        Past Surgical History:   Past Surgical History:   Procedure Laterality Date   • BACK SURGERY  1992   • CARDIAC CATHETERIZATION     • CARDIAC PACEMAKER PLACEMENT     • CHOLECYSTECTOMY  1968   • COLONOSCOPY  2009    Fiberoptic (Onset Date: 2009)   • CORONARY STENT PLACEMENT  1994   • HYSTERECTOMY  1998   • INTERNAL CARDIAC DEFIBRILLATOR INSERTION      Dual Lead Cardioverter-Defibrilator   • PACEMAKER IMPLANTATION  1994   • TONSILLECTOMY  1955       Family Hisotry:   Family History   Problem Relation Age of Onset   • Dementia Mother    • Pneumonia Mother    • COPD Father    • Emphysema Father    • Diabetes Father    • Lung cancer Sister    • Cancer Sister    • COPD Sister    •  Hyperlipidemia Sister    • Diabetes Sister    • Hyperlipidemia Brother    • Diabetes Brother        Social History:   Social History     Social History   • Marital status:      Spouse name: N/A   • Number of children: N/A   • Years of education: N/A     Occupational History   • Not on file.     Social History Main Topics   • Smoking status: Former Smoker   • Smokeless tobacco: Never Used   • Alcohol use No   • Drug use: No   • Sexual activity: No     Other Topics Concern   • Not on file     Social History Narrative       Medications:   Current Outpatient Prescriptions   Medication Sig Dispense Refill   • ALPRAZolam (XANAX) 0.5 MG tablet Take 1 tablet by mouth 3 (Three) Times a Day As Needed (panic attack) for up to 9 doses. 9 tablet 0   • ammonium lactate (LAC-HYDRIN) 12 % lotion Apply  topically 2 (Two) Times a Day As Needed for dry skin. Apply liberally to affected area BID. Refill PRN up to 3 bottles/mo for 12 mo 500 g 24   • atorvastatin (LIPITOR) 20 MG tablet Take 1 tablet by mouth Every Night.     • bisoprolol (ZEBeta) 10 MG tablet Take 1 tablet by mouth Daily.     • budesonide-formoterol (SYMBICORT) 160-4.5 MCG/ACT inhaler Inhale 2 puffs 2 (Two) Times a Day. Rinse mouth with water after use. 3 inhaler 3   • bumetanide (BUMEX) 1 MG tablet Take 0.5 tablets by mouth Daily As Needed (for weight gain of 3 or more lbs  or edema or shortness of air, in addition to lasix).     • buPROPion XL (WELLBUTRIN XL) 150 MG 24 hr tablet Take 1 tablet by mouth Every Morning.     • clopidogrel (PLAVIX) 75 MG tablet TAKE ONE TABLET BY MOUTH DAILY 30 tablet 4   • escitalopram (LEXAPRO) 10 MG tablet Take 1 tablet by mouth Daily.     • furosemide (LASIX) 40 MG tablet Take 1 tablet by mouth 2 (Two) Times a Day As Needed (lower leg swelling). 60 tablet 2   • hydrOXYzine (VISTARIL) 25 MG capsule Take 1 capsule by mouth 3 (Three) Times a Day As Needed for Itching or Anxiety. 90 capsule 0   • insulin aspart (novoLOG) 100 UNIT/ML  injection Inject 4-10 Units under the skin 4 (Four) Times a Day Before Meals & at Bedtime. Per sliding scale     • insulin detemir (LEVEMIR) 100 UNIT/ML injection Inject 40 Units under the skin Every Night.  12   • ipratropium-albuterol (COMBIVENT RESPIMAT)  MCG/ACT inhaler Inhale 1 puff 4 (Four) Times a Day As Needed for shortness of air. 3 g 3   • levothyroxine (SYNTHROID, LEVOTHROID) 125 MCG tablet TAKE ONE TABLET BY MOUTH DAILY 30 tablet 4   • lidocaine (LIDODERM) 5 % Place 1 patch on the skin every day. (Patient taking differently: Place 1 patch on the skin Daily. Only uses PRN) 30 patch 1   • losartan (COZAAR) 50 MG tablet Take 50 mg by mouth 2 (Two) Times a Day.     • melatonin 3 MG tablet Take 1 tablet by mouth At Night As Needed for sleep. (Patient taking differently: Take 3 mg by mouth At Night As Needed for sleep. No longer taking)     • metoclopramide (REGLAN) 5 MG tablet Take 2.5 mg by mouth 2 (Two) Times a Day.     • mirtazapine (REMERON) 45 MG tablet Take 1 tablet by mouth Every Night.     • nitrofurantoin, macrocrystal-monohydrate, (MACROBID) 100 MG capsule Take 1 capsule by mouth 2 (Two) Times a Day. 14 capsule 0   • pantoprazole (PROTONIX) 40 MG EC tablet Take 1 tablet by mouth 2 (Two) Times a Day. 60 tablet 5   • polyethylene glycol (MIRALAX) packet Take 17 g by mouth Daily As Needed (constipation). (Patient taking differently: Take 17 g by mouth Daily As Needed (constipation). No longer taking) 850 g 0   • ranitidine (ZANTAC) 150 MG capsule Take 1 capsule by mouth 2 (Two) Times a Day As Needed for indigestion or heartburn. 60 capsule 11   • rOPINIRole (REQUIP) 1 MG tablet Take 1 tablet by mouth Every Night. Take 1 hour before bedtime.     • sacubitril-valsartan (ENTRESTO) 24-26 MG tablet Take 1 tablet by mouth Every 12 (Twelve) Hours. 60 tablet    • sodium chloride (OCEAN) 0.65 % nasal spray 2 sprays into each nostril As Needed for Congestion.  12   • spironolactone (ALDACTONE) 25 MG tablet  "Take 1 tablet by mouth Every Other Day.     • Sunscreens (CHAPSTICK) stick Apply 1 each topically 4 (Four) Times a Day As Needed (chapped lips).  0   • tiotropium (SPIRIVA HANDIHALER) 18 MCG per inhalation capsule Place 2 puffs into inhaler and inhale Daily. 90 capsule 3     No current facility-administered medications for this visit.        Allergy:   Allergies   Allergen Reactions   • Amphetamine-Dextroamphetamine    • Lorazepam    • Other      No Known Food Allergy       Review of Systems:  Review of Systems   Constitutional: Positive for fatigue. Negative for chills, diaphoresis and fever.   HENT: Negative for congestion, ear pain, hearing loss, rhinorrhea and sore throat.    Eyes: Negative for pain, discharge and redness.   Respiratory: Negative for cough, shortness of breath, wheezing and stridor.    Cardiovascular: Negative for chest pain, palpitations and leg swelling.   Gastrointestinal: Negative for abdominal pain, anorexia, change in bowel habit, constipation, nausea and vomiting.   Endocrine: Negative for cold intolerance, heat intolerance and polyphagia.   Genitourinary: Negative for dysuria, flank pain, frequency and urgency.   Musculoskeletal: Positive for gait problem. Negative for arthralgias, joint swelling, myalgias, neck pain and neck stiffness.   Skin: Negative for pallor, rash and wound.   Allergic/Immunologic: Negative for environmental allergies.   Neurological: Positive for dizziness, syncope, weakness and light-headedness. Negative for vertigo, tremors, seizures, facial asymmetry, speech difficulty, numbness and headaches.   Hematological: Negative for adenopathy.   Psychiatric/Behavioral: Positive for sleep disturbance. Negative for confusion and hallucinations. The patient has insomnia. The patient is not nervous/anxious.        Physical Exam     Vitals:    04/11/17 1531   BP: 118/64   Pulse: 77   SpO2: 100%   Weight: 183 lb (83 kg)   Height: 65\" (165.1 cm)     GENERAL: Patient is " pleasant, cooperative, appears to be stated age.  Body habitus is endomorphic.  SKIN AND EXTREMITIES:  No skin rashes or lesions are noted.  No cyanosis, clubbing or edema of the extremities.    HEAD:  Head is normocephalic and atraumatic.    NECK: Neck are non-tender without thyromegaly or adenopathy.  Carotic upstrokes are 1+/4.  No cranial or cervical bruits.  The neck is supple with a full range of motion.   ENT: palate elevate symmetrically, no evidence of high arch palate, tongue midline erythema in posterior pharynx, Mallampati Classification Class III   CARDIOVASCULAR:  Regular rate and rhythm with normal S1 and S2 without rub or gallop.  RESPIRATORY:  Clear to auscultation without wheezes or crackle   ABDOMEN:  Soft and non-tender, positive bowel sound without hepatosplenomegaly  BACK:  Back is straight without midline defect.    PSYCH:  Higher cortical function/mental status:  The patient is alert.  She is oriented x3 to time, place and person.  Recent and the remote memory appear normal.  The patient has a good fund of knowledge.  There is no visual or auditory hallucination or suicidal or homicidal ideation.  SPEECH:There is no gross evidence of aphasia, dysarthria or agnosia.      CRANIAL NERVES:  Pupils are 4mm, equal round reactive to light, reacting briskly to 2mm without afferent pupillary defect.  Visual fields are intact to confrontation testing.  Fundoscopic examination reveals sharp disk margins with normal vasculature.  No papilledema, hemorrhages or exudates.  Extraocular movements are full and smooth with normal pursuits and saccades.  No nystagmus noted.  The face is symmetric. palate elevate symmetrically, Tongue midline, positive gag reflex. The remainder of the cranial nerves are intact and symmetrical.    MOTOR: Strength is 5/5 throughout with normal tone and bulk with the following exceptions, 4/5 intrinsic muscles of the hands and feet.  No involuntary movements noted.    Deep Tendon  Reflexes: are 2/4 and symmetrical in the upper extremities, 2/4 and symmetrical at the knees and 1/4 and symmetrical at the Achilles tendon.  Plantar responses were down-going bilaterally.    SENSATION:  Intact to pinprick, light touch, vibration and proprioception.  Coordination:  The patient normally performs finger-nose-finger, heel-to-knee-to-shin and rapid alternating movements in symmetrical fashion.    COORDINATION AND GAIT:  The patient walks with a narrow-based gait.  Patient is able to heel-toe and tandem walk forward and backwards without difficulty.  Romberg and monopedal  Romberg are negative.    MUSCULOSKELETAL: Range of motion normal, no clubbing, cyanosis, or edema.  No joint swelling.            Studies: I have personally reviewed the following and discussed with the patient.  Results for orders placed or performed during the hospital encounter of 04/09/17   Comprehensive Metabolic Panel   Result Value Ref Range    Glucose 153 (H) 74 - 98 mg/dL    BUN 34 (H) 7 - 20 mg/dL    Creatinine 0.90 0.60 - 1.30 mg/dL    Sodium 135 (L) 137 - 145 mmol/L    Potassium 4.6 3.5 - 5.1 mmol/L    Chloride 91 (L) 98 - 107 mmol/L    CO2 37.0 (H) 26.0 - 30.0 mmol/L    Calcium 9.4 8.4 - 10.2 mg/dL    Total Protein 7.2 6.3 - 8.2 g/dL    Albumin 3.60 3.50 - 5.00 g/dL    ALT (SGPT) 54 13 - 69 U/L    AST (SGOT) 53 (H) 15 - 46 U/L    Alkaline Phosphatase 115 38 - 126 U/L    Total Bilirubin 1.2 0.2 - 1.3 mg/dL    eGFR Non African Amer 61 >60 mL/min/1.73    Globulin 3.6 gm/dL    A/G Ratio 1.0 1.0 - 2.0 g/dL    BUN/Creatinine Ratio 37.8 (H) 7.1 - 23.5    Anion Gap 11.6 mmol/L   Urinalysis With / Culture If Indicated   Result Value Ref Range    Color, UA Yellow Yellow, Straw    Appearance, UA Clear Clear    pH, UA 7.5 5.0 - 8.0    Specific Gravity, UA 1.015 1.005 - 1.030    Glucose, UA Negative Negative    Ketones, UA Negative Negative    Bilirubin, UA Negative Negative    Blood, UA Negative Negative    Protein, UA 30 mg/dL (1+)  (A) Negative    Leuk Esterase, UA Negative Negative    Nitrite, UA Negative Negative    Urobilinogen, UA 1.0 E.U./dL 0.2 - 1.0 E.U./dL   Troponin   Result Value Ref Range    Troponin I 0.016 0.000 - 0.034 ng/mL   CBC Auto Differential   Result Value Ref Range    WBC 6.31 4.80 - 10.80 10*3/mm3    RBC 3.50 (L) 4.20 - 5.40 10*6/mm3    Hemoglobin 9.6 (L) 12.0 - 16.0 g/dL    Hematocrit 31.3 (L) 37.0 - 47.0 %    MCV 89.4 81.0 - 99.0 fL    MCH 27.4 27.0 - 31.0 pg    MCHC 30.7 30.0 - 37.0 g/dL    RDW 18.1 (H) 11.5 - 14.5 %    RDW-SD 57.1 (H) 37.0 - 54.0 fl    MPV 10.1 6.0 - 12.0 fL    Platelets 337 130 - 400 10*3/mm3    Neutrophil % 74.3 37.0 - 80.0 %    Lymphocyte % 11.4 10.0 - 50.0 %    Monocyte % 11.4 0.0 - 12.0 %    Eosinophil % 1.3 0.0 - 7.0 %    Basophil % 1.1 0.0 - 2.5 %    Immature Grans % 0.5 0.0 - 0.6 %    Neutrophils, Absolute 4.69 2.00 - 6.90 10*3/mm3    Lymphocytes, Absolute 0.72 0.60 - 3.40 10*3/mm3    Monocytes, Absolute 0.72 0.00 - 0.90 10*3/mm3    Eosinophils, Absolute 0.08 0.00 - 0.70 10*3/mm3    Basophils, Absolute 0.07 0.00 - 0.20 10*3/mm3    Immature Grans, Absolute 0.03 0.00 - 0.06 10*3/mm3    nRBC 0.0 0.0 - 0.0 /100 WBC   Urinalysis, Microscopic Only   Result Value Ref Range    RBC, UA 0-2 (A) None Seen /HPF    WBC, UA 0-2 (A) None Seen /HPF    Bacteria, UA Trace (A) None Seen /HPF    Squamous Epithelial Cells, UA 0-2 None Seen, 0-2 /HPF    Hyaline Casts, UA None Seen None Seen /LPF    Methodology Manual Light Microscopy    Light Blue Top   Result Value Ref Range    Extra Tube hold for add-on    Lavender Top   Result Value Ref Range    Extra Tube hold for add-on    Gold Top - SST   Result Value Ref Range    Extra Tube Hold for add-ons.    Green Top (No Gel)   Result Value Ref Range    Extra Tube Hold for add-ons.        Review of Imaging: I have personally reviewed the following images and discussed with the patient.  CT scan show microvascular ischemic changes and hydrocephalus ex vacuo and possible  NPH      Records Reviewed: I have personally reviewed her previous medical record.    Yudy was seen today for head injury.    Diagnoses and all orders for this visit:    Restless legs syndrome (RLS)    Other iron deficiency anemia  -     Ferritin; Future    Renal insufficiency    Hepatic dysfunction    NPH (normal pressure hydrocephalus)    Chronic systolic heart failure    Orthopnea        Treatments:  1. I have counseled patient extensively that how her heart failure giving rise to hepatic dysfunction and renal dysfunction  2.  And the heart failure given rise to orthopnea  3. I have also explained to the patient that her renal dysfunction giving rise to RLS  4. I have counseled patient extensively on RLS as follow  I have counseled patient extensively on the pathophysiology of Restless Legs Syndrome(RLS).  I have explained to the patient how D3 receptor dysfunction can give rise to RLS.  We have also discussed how iron deficiency can contribute to RLS.   Nevertheless, the first line of defense against restless legs syndrome is to avoid substances or foods that may be causing or worsening the problem such as alcohol, caffeine , and nicotine. In addition, we have reviewed all medications could exacerbate the problem and checking Ferritin level today.      I have also reviewed with my patient any underlying medical conditions that could give rise to secondary RLS, such as anemia , diabetes , nutritional deficiencies, kidney disease, thyroid  disease, varicose veins, or Parkinson's disease.     We have discussed FDA approved pharmacologic intervention such as dopamine agonists.  These are most often the first line treatment used to treat RLS including Mirapex (pramipexole), Neupro patch (rotigotine), and Requip (ropinirole). I have also counseled patient on side effects of Dopamine agonists include daytime sleepiness, nausea, hypotension and lightheadedness.    We have also discussed the use of non-dopaminergic  medication such as Horizant (gabapentin enacarbil), as an adjunct to relieve the symptoms of RLS especially painful RLS symptoms.  5. We have provided patient with articles on RLS  6. We have also counseled patient on various treatment options  7.  Regular sleep wake schedule  8.  Avoid sleep deprivation    This Document is signed by Sal Ralph MD, FAAN, FAASM April 11, 20177:30 PM

## 2017-04-14 NOTE — TELEPHONE ENCOUNTER
PT'S SON LVM ABOUT FERRITIN ORDER. STATES THAT HE WAS NOT SURE IF THEY WERE GETTING AN RX OR IF THEY WERE TO PURCHASE OTC. PLEASE ADVISE

## 2017-04-18 NOTE — TELEPHONE ENCOUNTER
I have gotten every thing together this morning for HH in [place], then saw this message. LM on pt's daughter's vmx asking her to please call me back. Need to know if [facility] is ok and we will refer her there.

## 2017-04-18 NOTE — TELEPHONE ENCOUNTER
Helen called back and said that [facility] would be fine. Will get this put in today. Pt has appt @ 1 pm today with Cha.

## 2017-04-18 NOTE — TELEPHONE ENCOUNTER
----- Message from Angella Nieto sent at 4/18/2017  8:33 AM EDT -----  Contact: JEANETTE GEORGE/DAUGHTER  PT'S DAUGHTER SAID HER COUSIN HAS DECIDED TO COME TO [place] TO STAY WITH HER MOTHER. SHE NEEDS A HOME HEALTH REFERRAL FOR [place] INSTEAD OF [place].     JEANETTE REQUESTED A C/B TO [facility] OF THE HOME HEALTH AGENCY THAT WILL BE OVERSEEING HER MOTHER'S CARE. [phone]

## 2017-04-18 NOTE — TELEPHONE ENCOUNTER
Pt's daughter called back after I put in order for [facility], advised pt is going to move in with family, requesting [facility] in [place].

## 2017-04-18 NOTE — TELEPHONE ENCOUNTER
Pt is living with family in [facility]. They are requesting [facility]. Per Ariela @ [facility] in [place], she will need PCP in their area. Advised her we will go ahead and do initial referral for home health, new PCP will then take over. Face sheet, problem list, order faxed to Ariela @ [facility] in [place] @ [phone].

## 2017-04-18 NOTE — PROGRESS NOTES
Yudy Vásquez is a 72 y.o. female.     Subjective   History of Present Illness   Here today for follow up of cognitive changes, UTI, sleep disturbances and itching.     Persistent itching, particularly for a few hours at night despite use of Hydroxyzine.  Not sleeping at night, very restless.  Using Xanax three times daily which does not effect her symptoms. Seems to be confused some at night but fine during the day.  Is supposed to be using Remeron nightly but has not taken in a few weeks.           The following portions of the patient's history were reviewed and updated as appropriate: allergies, current medications, past family history, past medical history, past social history, past surgical history and problem list.    Review of Systems    Constitutional: Negative for appetite change, chills, fatigue, fever and unexpected weight change.   HENT: Negative for congestion, ear pain, hearing loss, nosebleeds, postnasal drip, rhinorrhea, sore throat, tinnitus and trouble swallowing.    Eyes: Negative for photophobia, discharge and visual disturbance.   Respiratory: Negative for cough, chest tightness, shortness of breath and wheezing.    Cardiovascular: Negative for chest pain, palpitations and leg swelling.   Gastrointestinal: Negative for abdominal distention, abdominal pain, blood in stool, constipation, diarrhea, nausea and vomiting.   Endocrine: Negative for cold intolerance, heat intolerance, polydipsia, polyphagia and polyuria.   Musculoskeletal: Negative for arthralgias, back pain, joint swelling, myalgias, neck pain and neck stiffness.   Skin: persistent itching. Negative for color change, pallor, rash and wound.   Allergic/Immunologic: Negative for environmental allergies, food allergies and immunocompromised state.   Neurological: Negative for dizziness, tremors, seizures, weakness, numbness and headaches.   Hematological: Negative for adenopathy. Does not bruise/bleed easily.  "  Psychiatric/Behavioral: sleep disturbances, agitation. Negative for agitation, behavioral problems, confusion, hallucinations, self-injury and suicidal ideas. The patient is not nervous/anxious.      Objective    Physical Exam  Constitutional: Oriented to person, place, and time. Appears well-developed and well-nourished.   HENT:   Head: Normocephalic and atraumatic.   Eyes: EOM are normal. Pupils are equal, round, and reactive to light.   Neck: Normal range of motion. Neck supple.   Cardiovascular: Normal rate, regular rhythm and normal heart sounds.    Pulmonary/Chest: O2 via nasal cannula. Effort normal and breath sounds normal. No respiratory distress.  Has no wheezes or rales. Exhibits no chest wall tenderness.   Abdominal: Soft. Bowel sounds are normal. Exhibits no distension and no mass. There is no tenderness.   Musculoskeletal: ambulates via walker. Normal range of motion. Exhibits no tenderness.   Neurological: Alert and oriented to person, place, and time.   Skin: Skin is warm and dry.   Psychiatric: Has a normal mood and affect. Behavior is normal. Judgment and thought content normal.       /74  Pulse 79  Temp 98.3 °F (36.8 °C)  Ht 65\" (165.1 cm)  Wt 172 lb (78 kg)  SpO2 96%  BMI 28.62 kg/m2    Nursing note and vitals reviewed.        Assessment/Plan   Yudy was seen today for follow-up and urinary tract infection.    Diagnoses and all orders for this visit:    Itching    RLS (restless legs syndrome)    Psychophysiological insomnia  -     QUEtiapine (SEROQUEL) 25 MG tablet; Take 1 tablet by mouth Every Night.    Vitamin D deficiency disease  -     Vitamin D 25 Hydroxy    Abnormal CBC  -     CBC Auto Differential    Elevated serum creatinine  -     Comprehensive Metabolic Panel    Other orders  -     polyethylene glycol (MIRALAX) packet; Take 17 g by mouth Daily As Needed (constipation).  -     atorvastatin (LIPITOR) 20 MG tablet; Take 1 tablet by mouth Every Night.      Trial of Seroquel " to help with sleep. Discontinue Remeron.     F/u with Dr. Vera in 2 weeks.

## 2017-04-19 NOTE — TELEPHONE ENCOUNTER
----- Message from Phoebe Ames sent at 4/19/2017 11:19 AM EDT -----  Contact: JEANETTE Duarte  Patients daughter called the office requesting to see if you could call her back when you get a chance in regards to questions to her mothers medication. [phone].

## 2017-04-19 NOTE — TELEPHONE ENCOUNTER
AFTER SPEAKING WITH JEANETTE VILLANUEVA /   PATIENT ADVISED TO TAKE TWO SEROQUEL. SHE STATES SHE WILL GIVE IT A TRY BUT IF HER MOTHER DOES NOT GET ANY SLEEP SHE IS GOING TO TAKE HER TO THE ER.

## 2017-04-25 NOTE — TELEPHONE ENCOUNTER
----- Message from Safia Cabello sent at 4/25/2017  3:31 PM EDT -----  Contact: Helen Duarte  Returned Cha's call.

## 2017-06-06 NOTE — ED PROVIDER NOTES
Subjective   Patient is a 72 y.o. female presenting with weakness.   History provided by:  Patient   used: No    Weakness - Generalized   Severity:  Moderate  Onset quality:  Sudden  Duration:  2 weeks  Timing:  Constant  Progression:  Worsening  Chronicity:  New  Context: dehydration    Context: not alcohol use, not allergies and not drug use    Relieved by:  None tried  Associated symptoms: abdominal pain, nausea and vomiting    Associated symptoms: no anorexia    Risk factors: no anemia, no congestive heart failure, no excessive menstruation, no family hx of stroke, no neurologic disease and no new medications        Review of Systems   Gastrointestinal: Positive for abdominal pain, nausea and vomiting. Negative for anorexia.   All other systems reviewed and are negative.      Past Medical History:   Diagnosis Date   • Anemia    • Anxiety    • Arthritis    • Bacterial sinusitis    • Chronic back pain    • Chronic coronary artery disease 5/12/2016 1996 PTCA of RCA.  2004 PTCA cutting balloon and Express stenting of RCA.  October 2007, Taxus YANDY to proximal and mid-RCA per LCB.  09/10/2008 LHC, nonobstructive plaque. EF 55%.  December 2014, echocardiogram [facility], EF 35% with RVSP of 56 and aortic sclerosis.     • Chronic obstructive lung disease    • Colon cancer screening    • Colon polyps      · Last Impression: 09 Dec 2015  History of multiple colon polyps including tubularadenomata.  Maximino Fields (Gastroenterology)   • Constipation    • Depression    • Depression    • Diarrhea    • Disease of thyroid gland    • Dysphagia 01/20/2013   • Esophageal reflux    • Esophagitis, reflux    • Hernia    • History of bronchitis    • History of cardiac pacemaker    • History of degenerative disc disease    • History of tobacco abuse    • History of urinary tract infection    • Hyperlipidemia    • Hypothyroidism    • Myocardial infarction    • Nausea    • Occult blood in stools      Impression: 12/09/2015 - This could be multifactorial including secondary to small nasal bleed.  However the small nasal bleed does not explain anemia as such.  The patient previously had undergone a colonoscopy in February 2014.  However unfortunately the prep was considered to be suboptimal.;    • Pancreatitis     status post ERCP for common duct stone in 2001   • Renal disorder    • RLS (restless legs syndrome)        Allergies   Allergen Reactions   • Amphetamine-Dextroamphetamine    • Lorazepam    • Other      No Known Food Allergy       Past Surgical History:   Procedure Laterality Date   • BACK SURGERY  1992   • CARDIAC CATHETERIZATION     • CARDIAC PACEMAKER PLACEMENT     • CHOLECYSTECTOMY  1968   • COLONOSCOPY  2009    Fiberoptic (Onset Date: 2009)   • CORONARY STENT PLACEMENT  1994   • HYSTERECTOMY  1998   • INTERNAL CARDIAC DEFIBRILLATOR INSERTION      Dual Lead Cardioverter-Defibrilator   • PACEMAKER IMPLANTATION  1994   • TONSILLECTOMY  1955       Family History   Problem Relation Age of Onset   • Dementia Mother    • Pneumonia Mother    • COPD Father    • Emphysema Father    • Diabetes Father    • Lung cancer Sister    • Cancer Sister    • COPD Sister    • Hyperlipidemia Sister    • Diabetes Sister    • Hyperlipidemia Brother    • Diabetes Brother        Social History     Social History   • Marital status:      Spouse name: N/A   • Number of children: N/A   • Years of education: N/A     Social History Main Topics   • Smoking status: Former Smoker   • Smokeless tobacco: Never Used   • Alcohol use No   • Drug use: No   • Sexual activity: No     Other Topics Concern   • None     Social History Narrative           Objective   Physical Exam   Constitutional: She is oriented to person, place, and time. She appears well-developed and well-nourished.   HENT:   Head: Normocephalic.   Right Ear: External ear normal.   Left Ear: External ear normal.   Nose: Nose normal.   Mouth/Throat: Oropharynx is clear  and moist.   Eyes: Conjunctivae and EOM are normal. Pupils are equal, round, and reactive to light.   Neck: Normal range of motion. Neck supple. No tracheal deviation present. No thyromegaly present.   Cardiovascular: Normal rate, regular rhythm, normal heart sounds and intact distal pulses.    Pulmonary/Chest: Effort normal and breath sounds normal.   Abdominal: Soft. Bowel sounds are normal. She exhibits no mass. There is tenderness. There is no rebound and no guarding. No hernia.   Musculoskeletal: Normal range of motion.   Neurological: She is alert and oriented to person, place, and time. She has normal reflexes.   Skin: Skin is warm and dry.   Psychiatric: She has a normal mood and affect. Her behavior is normal. Judgment and thought content normal.   Nursing note and vitals reviewed.      Procedures         ED Course  ED Course   Value Comment By Time    This is a 72-year-old female comes in with persistent nausea, constipation, abdominal pain intermittently for the past week. Emmanuel Duke PA-C 06/05 1926   Hemoglobin: (!) 9.5 (Reviewed) Emmanuel Duke PA-C 06/05 2027   Hemoglobin: (!) 9.5 (Reviewed) Emmanuel Duke PA-C 06/05 2027    Discussed care with family. Patient does state that she feels better at this time. Patient will be discharge and instructed to f/u with pcp in the next 1-2 days. Emmanuel Duke PA-C 06/05 2138    Patient has been able to eat here in ER. Emmanuel Duke PA-C 06/05 2209                  MDM  Number of Diagnoses or Management Options  Nausea: new and requires workup  Weakness generalized: new and requires workup     Amount and/or Complexity of Data Reviewed  Clinical lab tests: reviewed and ordered  Tests in the radiology section of CPT®: ordered and reviewed  Decide to obtain previous medical records or to obtain history from someone other than the patient: yes  Independent visualization of images, tracings, or specimens: yes    Risk of Complications,  Morbidity, and/or Mortality  Presenting problems: moderate  Diagnostic procedures: moderate  Management options: moderate    Patient Progress  Patient progress: stable      Final diagnoses:   Nausea   Weakness generalized            Emmanuel Duke PA-C  06/05/17 2211       Emmanuel Duke PA-C  06/05/17 2211

## 2017-06-07 NOTE — PROGRESS NOTES
Continued Stay Note  CLAUDIA Fofana     Patient Name: Yudy Vásquez  MRN: [id number]  Today's Date: 6/7/2017    Admit Date: 6/7/2017          Discharge Plan       06/07/17 1018    Case Management/Social Work Plan    Plan SW consult due to needing more resources. Sw spoke to patient and her daughter whom she is currently staying, has been to rehab in past. Patient has been falling frequently. Daughter is on FMLA and has consulted an elder .  She has her mother on LTC waiting lists and wants her in [place] so her brothers can help her.  She is only on FmLA untill July.  SHe is looking for a medicaid pending bed as the patient gets too much a month for regular medicaid.  She has the [facility] list.  Provided her with elder resources book, [facility]  information and sitter list. She has a sitter if needed.     Patient/Family In Agreement With Plan yes    Additional Comments Discussed with nurse and MD.     Final Note    Final Note Seen for social and discharge planning issues.               Discharge Codes     None            Adriana Verdin

## 2017-06-07 NOTE — ED PROVIDER NOTES
Subjective   HPI Comments: Patient is a 72-year-old female who is chronically deconditioned and uses a walker to ambulate who fell while playing with her walker earlier today.  Mechanical fall.  She did not strike her head.  Patient did land on her back.  Patient states that her back aches and is worse with movement.  Patient is generally weak and states she is supposed to start physical therapy next week.  Patient lives with her daughter currently because of her weakened condition.  Patient's daughter states that she doesn't want to do anything as far as getting up and moving.  They're currently looking into long-term placement for rehabilitation.      Review of Systems   All other systems reviewed and are negative.      Past Medical History:   Diagnosis Date   • Anemia    • Anxiety    • Arthritis    • Bacterial sinusitis    • Chronic back pain    • Chronic coronary artery disease 5/12/2016 1996 PTCA of RCA.  2004 PTCA cutting balloon and Express stenting of RCA.  October 2007, Taxus YANDY to proximal and mid-RCA per LCB.  09/10/2008 C, nonobstructive plaque. EF 55%.  December 2014, echocardiogram [facility], EF 35% with RVSP of 56 and aortic sclerosis.     • Chronic obstructive lung disease    • Colon cancer screening    • Colon polyps      · Last Impression: 09 Dec 2015  History of multiple colon polyps including tubularadenomata.  Maximino Fields (Gastroenterology)   • Constipation    • Depression    • Depression    • Diarrhea    • Disease of thyroid gland    • Dysphagia 01/20/2013   • Esophageal reflux    • Esophagitis, reflux    • Hernia    • History of bronchitis    • History of cardiac pacemaker    • History of degenerative disc disease    • History of tobacco abuse    • History of urinary tract infection    • Hyperlipidemia    • Hypothyroidism    • Myocardial infarction    • Nausea    • Occult blood in stools     Impression: 12/09/2015 - This could be multifactorial including secondary to  small nasal bleed.  However the small nasal bleed does not explain anemia as such.  The patient previously had undergone a colonoscopy in February 2014.  However unfortunately the prep was considered to be suboptimal.;    • Pancreatitis     status post ERCP for common duct stone in 2001   • Renal disorder    • RLS (restless legs syndrome)        Allergies   Allergen Reactions   • Amphetamine-Dextroamphetamine    • Lorazepam    • Other      No Known Food Allergy       Past Surgical History:   Procedure Laterality Date   • BACK SURGERY  1992   • CARDIAC CATHETERIZATION     • CARDIAC PACEMAKER PLACEMENT     • CHOLECYSTECTOMY  1968   • COLONOSCOPY  2009    Fiberoptic (Onset Date: 2009)   • CORONARY STENT PLACEMENT  1994   • HYSTERECTOMY  1998   • INTERNAL CARDIAC DEFIBRILLATOR INSERTION      Dual Lead Cardioverter-Defibrilator   • PACEMAKER IMPLANTATION  1994   • TONSILLECTOMY  1955       Family History   Problem Relation Age of Onset   • Dementia Mother    • Pneumonia Mother    • COPD Father    • Emphysema Father    • Diabetes Father    • Lung cancer Sister    • Cancer Sister    • COPD Sister    • Hyperlipidemia Sister    • Diabetes Sister    • Hyperlipidemia Brother    • Diabetes Brother        Social History     Social History   • Marital status:      Spouse name: N/A   • Number of children: N/A   • Years of education: N/A     Social History Main Topics   • Smoking status: Former Smoker   • Smokeless tobacco: Never Used   • Alcohol use No   • Drug use: No   • Sexual activity: No     Other Topics Concern   • None     Social History Narrative           Objective   Physical Exam   Nursing note and vitals reviewed.    GEN: No acute distress  Head: Normocephalic, atraumatic  Eyes: Pupils equal round reactive to light  ENT: Posterior pharynx normal in appearance, oral mucosa is moist  Back: Patient has ecchymosis on the posterior thorax and is tender to palpation in these areas  Cardiovascular: Regular rate  Lungs:  Clear to auscultation bilaterally  Abdomen: Soft, nontender, nondistended, no peritoneal signs  Extremities: No edema, normal appearance  Neuro: GCS 15  Psych: Mood and affect are appropriate    Procedures         ED Course  ED Course                  MDM  Number of Diagnoses or Management Options  Fall, initial encounter:   Physical deconditioning:   Diagnosis management comments: Chest x-ray, L-spine, and T-spine x-rays are negative for acute pathology       Amount and/or Complexity of Data Reviewed  Clinical lab tests: ordered and reviewed  Decide to obtain previous medical records or to obtain history from someone other than the patient: yes  Obtain history from someone other than the patient: yes  Review and summarize past medical records: yes  Independent visualization of images, tracings, or specimens: yes        Final diagnoses:   Physical deconditioning   Fall, initial encounter            Pop Cutler MD  06/07/17 0004

## 2017-06-16 NOTE — TELEPHONE ENCOUNTER
Jessy-OT with  called requesting order for transport chair per pt's daughter request. They would like for it to be faxed to [facility] in [place]. [phone].

## 2017-06-19 NOTE — TELEPHONE ENCOUNTER
Patients daughter Helen called the office requesting to check the status on a Rx for a wheelchair. She would like a phone call back when you get a chance to see if this has been approved and sent so she can go and pick it up for her mom.

## 2017-06-19 NOTE — TELEPHONE ENCOUNTER
Called pt's daughter and asked if she wanted the order faxed to [facility], or if she wants to  rx. She wants to  rx, left it at  for her.

## 2017-06-20 NOTE — TELEPHONE ENCOUNTER
Called [facility], spoke with Jimbo. Evelyn advised him that they need office notes that state why she needs wheelchair. Fax to [phone].  Transport chair is smaller than manual, daughter wanted it just for doctor's appointments. Easier to maneuver and transport. Can you addend last note with reason why she needs wheelchair for [facility]? Thank you.

## 2017-06-20 NOTE — TELEPHONE ENCOUNTER
Patient's daughter picked up the prescription for the wheelchair and brought it over to [facility].  is now saying they need some type of visit notes giving a reason why Yudy needs the wheelchair.     She said if you give [facility] a call, they should be able to take care of it over the phone, so you don't have to send any faxes.    Their phone number is:   [phone]

## 2017-06-26 PROBLEM — Z78.9 IMPAIRED MOBILITY AND ADLS: Chronic | Status: ACTIVE | Noted: 2017-01-01

## 2017-06-26 PROBLEM — G20 PARKINSON'S DISEASE DEMENTIA (HCC): Chronic | Status: ACTIVE | Noted: 2017-01-01

## 2017-06-26 PROBLEM — Z74.09 IMPAIRED MOBILITY AND ADLS: Chronic | Status: ACTIVE | Noted: 2017-01-01

## 2017-06-26 PROBLEM — R54 AGE-RELATED PHYSICAL DEBILITY: Chronic | Status: ACTIVE | Noted: 2017-01-01

## 2017-06-26 PROBLEM — I50.42 CHRONIC COMBINED SYSTOLIC AND DIASTOLIC CHF (CONGESTIVE HEART FAILURE) (HCC): Status: ACTIVE | Noted: 2017-01-01

## 2017-06-26 PROBLEM — F01.518 VASCULAR DEMENTIA WITH BEHAVIOR DISTURBANCE (HCC): Chronic | Status: ACTIVE | Noted: 2017-01-01

## 2017-06-26 PROBLEM — F02.80 PARKINSON'S DISEASE DEMENTIA (HCC): Chronic | Status: ACTIVE | Noted: 2017-01-01

## 2017-06-26 PROBLEM — I50.42 CHRONIC COMBINED SYSTOLIC AND DIASTOLIC HEART FAILURE (HCC): Chronic | Status: ACTIVE | Noted: 2017-01-01

## 2017-06-26 PROBLEM — R29.6 FREQUENT FALLS: Chronic | Status: ACTIVE | Noted: 2017-01-01

## 2017-06-26 NOTE — TELEPHONE ENCOUNTER
ROSEANN FROM [facility] CALLED AND NEEDED A PLAN OF CARE FOR THE PT. ROSEANN STATES SHE HAS FAXED THIS TWICE AND HASN'T RECEIVED IT BACK. THANKS

## 2017-06-27 PROBLEM — I50.41 ACUTE COMBINED SYSTOLIC (CONGESTIVE) AND DIASTOLIC (CONGESTIVE) HEART FAILURE (HCC): Status: ACTIVE | Noted: 2017-01-01

## 2017-06-27 PROBLEM — E87.1 HYPONATREMIA: Status: ACTIVE | Noted: 2017-01-01

## 2017-06-27 PROBLEM — G31.83 PARKINSON'S DISEASE, LEWY BODY (HCC): Chronic | Status: ACTIVE | Noted: 2017-01-01

## 2017-06-27 PROBLEM — F01.518 VASCULAR DEMENTIA WITH BEHAVIOR DISTURBANCE (HCC): Chronic | Status: RESOLVED | Noted: 2017-01-01 | Resolved: 2017-01-01

## 2017-06-27 PROBLEM — I50.42 CHRONIC COMBINED SYSTOLIC AND DIASTOLIC HEART FAILURE (HCC): Chronic | Status: RESOLVED | Noted: 2017-01-01 | Resolved: 2017-01-01

## 2017-06-30 PROBLEM — I50.41 ACUTE COMBINED SYSTOLIC (CONGESTIVE) AND DIASTOLIC (CONGESTIVE) HEART FAILURE (HCC): Status: RESOLVED | Noted: 2017-01-01 | Resolved: 2017-01-01

## 2017-07-03 NOTE — THERAPY DISCHARGE NOTE
Acute Care - Occupational Therapy Discharge Summary   Brigido     Patient Name: Yudy Vásquez  : 1945  MRN: [id number]    Today's Date: 7/3/2017  Onset of Illness/Injury or Date of Surgery Date: 17    Date of Referral to OT: 17  Referring Physician: Dr. Ralph      Admit Date: 2017        OT Recommendation and Plan    Visit Dx:    ICD-10-CM ICD-9-CM   1. Dysphagia, oropharyngeal phase R13.12 787.22   2. Impaired functional mobility, balance, gait, and endurance Z74.09 V49.89   3. Impaired mobility and ADLs Z74.09 799.89   4. Acute on chronic congestive heart failure, unspecified congestive heart failure type I50.9 428.0                     OT Goals       17 1410          Bed Mobility OT LTG    Bed Mobility OT LTG, Date Established 17  -      Bed Mobility OT LTG, Time to Achieve 2 wks  -      Bed Mobility OT LTG, Activity Type supine to sit/sit to supine  -      Bed Mobility OT LTG, [place] Level minimum assist (75% patient effort)  -      Bed Mobility OT LTG, Assist Device bed rails  -      Bed Mobility OT LTG, Outcome goal ongoing  -      Transfer Training OT LTG    Transfer Training OT LTG, Date Established 17  -      Transfer Training OT LTG, Time to Achieve 2 wks  -      Transfer Training OT LTG, Activity Type sit to stand/stand to sit  -      Transfer Training OT LTG, [place] Level contact guard assist  -      Transfer Training OT LTG, Outcome goal ongoing  -      Strength OT LTG    Strength Goal OT LTG, Date Established 17  -      Strength Goal OT LTG, Time to Achieve 2 wks  -AH      Strength Goal OT LTG, Functional Goal Pt will participate in BUE strengthening ex using theraband for resistance.  -AH      Strength Goal OT LTG, Outcome goal ongoing  -      LB Dressing OT LTG    LB Dressing Goal OT LTG, Date Established 17  -      LB Dressing Goal OT LTG, Time to Achieve 2 wks  -AH      LB Dressing Goal OT LTG,  [place] Level minimum assist (75% patient effort)  -      LB Dressing Goal OT LTG, Outcome goal ongoing  -      Functional Mobility OT LTG    Functional Mobility Goal OT LTG, Date Established 06/28/17  -      Functional Mobility Goal OT LTG, Time to Achieve 2 wks  -      Functional Mobility Goal OT LTG, [place] Level contact guard  -      Functional Mobility Goal OT LTG, Assist Device rolling walker  -      Functional Mobility Goal OT LTG, Distance to Achieve in hallway  -      Functional Mobility Goal OT LTG, Additional Goal Pt will walk 50' with cga using RW  -      Functional Mobility Goal OT LTG, Outcome goal ongoing  -        User Key  (r) = Recorded By, (t) = Taken By, (c) = Cosigned By    Initials Name Provider Type    JAZMÍN Dailey Occupational Therapist                  OT Discharge Summary  Anticipated Discharge Disposition: skilled nursing facility  Reason for Discharge: Discharge from facility  Outcomes Achieved: Unable to make functional progress toward goals at this time (Pt seen for OT evaluation only.)  Discharge Destination: SNF      Katia Dailey  7/3/2017

## 2017-07-07 NOTE — OUTREACH NOTE
RN Care Advisor contacted Admission Staff at [facility] . Patient in long term care status.

## 2017-08-02 NOTE — TELEPHONE ENCOUNTER
Ms. Sharma called to get contact info for [employer] for an ICD turn off.  Info given to Jayme You to call and set this up today.

## 2017-08-03 NOTE — OUTREACH NOTE
CA talked with patient's daughter who states patient is currently in Hospice Facility in [place], [place].

## 2017-08-14 ENCOUNTER — TELEPHONE (OUTPATIENT)
Dept: INTERNAL MEDICINE | Facility: CLINIC | Age: 72
End: 2017-08-14

## 2020-09-08 NOTE — TELEPHONE ENCOUNTER
Pt's daughter called back, spoke with Nicci, advised they do want [facility].    Clear bilaterally, pupils equal, round and reactive to light.